# Patient Record
Sex: FEMALE | Race: WHITE | URBAN - METROPOLITAN AREA
[De-identification: names, ages, dates, MRNs, and addresses within clinical notes are randomized per-mention and may not be internally consistent; named-entity substitution may affect disease eponyms.]

---

## 2017-01-01 ENCOUNTER — APPOINTMENT (OUTPATIENT)
Dept: OCCUPATIONAL THERAPY | Facility: CLINIC | Age: 82
DRG: 545 | End: 2017-01-01
Payer: MEDICARE

## 2017-01-01 ENCOUNTER — HOSPITAL ENCOUNTER (OUTPATIENT)
Facility: CLINIC | Age: 82
Setting detail: OBSERVATION
Discharge: NURSING, REHAB OR LONG-TERM CARE | End: 2017-02-15
Attending: EMERGENCY MEDICINE | Admitting: INTERNAL MEDICINE
Payer: MEDICARE

## 2017-01-01 ENCOUNTER — HOSPITAL ENCOUNTER (EMERGENCY)
Facility: CLINIC | Age: 82
Discharge: HOME | End: 2017-04-13
Attending: NURSE PRACTITIONER | Admitting: NURSE PRACTITIONER
Payer: MEDICARE

## 2017-01-01 ENCOUNTER — HOSPITAL ENCOUNTER (INPATIENT)
Facility: CLINIC | Age: 82
LOS: 13 days | Discharge: HOSPICE | DRG: 545 | End: 2017-12-26
Attending: EMERGENCY MEDICINE | Admitting: INTERNAL MEDICINE
Payer: MEDICARE

## 2017-01-01 ENCOUNTER — APPOINTMENT (OUTPATIENT)
Dept: GENERAL RADIOLOGY | Facility: CLINIC | Age: 82
DRG: 545 | End: 2017-01-01
Attending: EMERGENCY MEDICINE
Payer: MEDICARE

## 2017-01-01 ENCOUNTER — APPOINTMENT (OUTPATIENT)
Dept: PHYSICAL THERAPY | Facility: CLINIC | Age: 82
End: 2017-01-01
Payer: MEDICARE

## 2017-01-01 ENCOUNTER — APPOINTMENT (OUTPATIENT)
Dept: GENERAL RADIOLOGY | Facility: CLINIC | Age: 82
End: 2017-01-01
Attending: EMERGENCY MEDICINE
Payer: MEDICARE

## 2017-01-01 ENCOUNTER — HOSPITAL ENCOUNTER (OUTPATIENT)
Facility: CLINIC | Age: 82
Setting detail: OBSERVATION
Discharge: HOME | End: 2017-02-13
Attending: EMERGENCY MEDICINE | Admitting: INTERNAL MEDICINE
Payer: MEDICARE

## 2017-01-01 ENCOUNTER — APPOINTMENT (OUTPATIENT)
Dept: OCCUPATIONAL THERAPY | Facility: CLINIC | Age: 82
DRG: 545 | End: 2017-01-01
Attending: INTERNAL MEDICINE
Payer: MEDICARE

## 2017-01-01 ENCOUNTER — TRANSFERRED RECORDS (OUTPATIENT)
Dept: HEALTH INFORMATION MANAGEMENT | Facility: CLINIC | Age: 82
End: 2017-01-01

## 2017-01-01 ENCOUNTER — APPOINTMENT (OUTPATIENT)
Dept: PHYSICAL THERAPY | Facility: CLINIC | Age: 82
End: 2017-01-01
Attending: PHYSICIAN ASSISTANT
Payer: MEDICARE

## 2017-01-01 ENCOUNTER — APPOINTMENT (OUTPATIENT)
Dept: CT IMAGING | Facility: CLINIC | Age: 82
DRG: 545 | End: 2017-01-01
Attending: INTERNAL MEDICINE
Payer: MEDICARE

## 2017-01-01 ENCOUNTER — APPOINTMENT (OUTPATIENT)
Dept: CT IMAGING | Facility: CLINIC | Age: 82
End: 2017-01-01
Attending: EMERGENCY MEDICINE
Payer: MEDICARE

## 2017-01-01 ENCOUNTER — APPOINTMENT (OUTPATIENT)
Dept: CARDIOLOGY | Facility: CLINIC | Age: 82
DRG: 545 | End: 2017-01-01
Attending: INTERNAL MEDICINE
Payer: MEDICARE

## 2017-01-01 ENCOUNTER — APPOINTMENT (OUTPATIENT)
Dept: PHYSICAL THERAPY | Facility: CLINIC | Age: 82
DRG: 545 | End: 2017-01-01
Payer: MEDICARE

## 2017-01-01 ENCOUNTER — APPOINTMENT (OUTPATIENT)
Dept: CT IMAGING | Facility: CLINIC | Age: 82
DRG: 545 | End: 2017-01-01
Attending: EMERGENCY MEDICINE
Payer: MEDICARE

## 2017-01-01 ENCOUNTER — HOSPITAL ENCOUNTER (EMERGENCY)
Facility: CLINIC | Age: 82
Discharge: HOME | End: 2017-02-19
Attending: EMERGENCY MEDICINE | Admitting: EMERGENCY MEDICINE
Payer: MEDICARE

## 2017-01-01 VITALS
SYSTOLIC BLOOD PRESSURE: 132 MMHG | TEMPERATURE: 98.4 F | HEIGHT: 64 IN | RESPIRATION RATE: 12 BRPM | WEIGHT: 143.7 LBS | HEART RATE: 75 BPM | OXYGEN SATURATION: 96 % | DIASTOLIC BLOOD PRESSURE: 70 MMHG | BODY MASS INDEX: 24.53 KG/M2

## 2017-01-01 VITALS
HEART RATE: 82 BPM | TEMPERATURE: 96.5 F | DIASTOLIC BLOOD PRESSURE: 83 MMHG | OXYGEN SATURATION: 92 % | SYSTOLIC BLOOD PRESSURE: 176 MMHG | WEIGHT: 155 LBS | RESPIRATION RATE: 22 BRPM

## 2017-01-01 VITALS
WEIGHT: 135 LBS | RESPIRATION RATE: 18 BRPM | TEMPERATURE: 97.6 F | HEART RATE: 68 BPM | SYSTOLIC BLOOD PRESSURE: 166 MMHG | HEIGHT: 62 IN | DIASTOLIC BLOOD PRESSURE: 78 MMHG | BODY MASS INDEX: 24.84 KG/M2 | OXYGEN SATURATION: 97 %

## 2017-01-01 VITALS
DIASTOLIC BLOOD PRESSURE: 87 MMHG | HEIGHT: 63 IN | SYSTOLIC BLOOD PRESSURE: 157 MMHG | RESPIRATION RATE: 16 BRPM | TEMPERATURE: 97 F | BODY MASS INDEX: 25.45 KG/M2 | OXYGEN SATURATION: 96 % | WEIGHT: 143.6 LBS | HEART RATE: 129 BPM

## 2017-01-01 VITALS
HEART RATE: 71 BPM | OXYGEN SATURATION: 95 % | HEIGHT: 62 IN | RESPIRATION RATE: 18 BRPM | TEMPERATURE: 97.8 F | BODY MASS INDEX: 27.6 KG/M2 | WEIGHT: 150 LBS | SYSTOLIC BLOOD PRESSURE: 154 MMHG | DIASTOLIC BLOOD PRESSURE: 85 MMHG

## 2017-01-01 DIAGNOSIS — G47.00 INSOMNIA, UNSPECIFIED TYPE: Primary | ICD-10-CM

## 2017-01-01 DIAGNOSIS — F41.9 ANXIETY: Primary | ICD-10-CM

## 2017-01-01 DIAGNOSIS — S09.90XA CLOSED HEAD INJURY, INITIAL ENCOUNTER: ICD-10-CM

## 2017-01-01 DIAGNOSIS — I48.91 ATRIAL FIBRILLATION WITH RVR (H): ICD-10-CM

## 2017-01-01 DIAGNOSIS — R05.9 COUGH: Primary | ICD-10-CM

## 2017-01-01 DIAGNOSIS — G89.4 CHRONIC PAIN SYNDROME: ICD-10-CM

## 2017-01-01 DIAGNOSIS — R05.9 COUGH: ICD-10-CM

## 2017-01-01 DIAGNOSIS — R53.1 GENERALIZED WEAKNESS: ICD-10-CM

## 2017-01-01 DIAGNOSIS — N39.0 URINARY TRACT INFECTION WITHOUT HEMATURIA, SITE UNSPECIFIED: ICD-10-CM

## 2017-01-01 DIAGNOSIS — K59.03 DRUG-INDUCED CONSTIPATION: ICD-10-CM

## 2017-01-01 DIAGNOSIS — M54.41 CHRONIC BILATERAL LOW BACK PAIN WITH RIGHT-SIDED SCIATICA: ICD-10-CM

## 2017-01-01 DIAGNOSIS — R82.71 ASYMPTOMATIC BACTERIURIA: ICD-10-CM

## 2017-01-01 DIAGNOSIS — R93.0 ABNORMAL CT SCAN OF HEAD: ICD-10-CM
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
## 2017-01-01 DIAGNOSIS — M79.10 MYALGIA: ICD-10-CM

## 2017-01-01 DIAGNOSIS — G89.29 CHRONIC BILATERAL LOW BACK PAIN WITH RIGHT-SIDED SCIATICA: ICD-10-CM

## 2017-01-01 DIAGNOSIS — M25.552 HIP PAIN, LEFT: ICD-10-CM

## 2017-01-01 DIAGNOSIS — R30.0 DYSURIA: ICD-10-CM

## 2017-01-01 LAB
ALBUMIN SERPL-MCNC: 2.4 G/DL (ref 3.4–5)
ALBUMIN SERPL-MCNC: 3.6 G/DL (ref 3.4–5)
ALBUMIN UR-MCNC: 100 MG/DL
ALBUMIN UR-MCNC: NEGATIVE MG/DL
ALP SERPL-CCNC: 61 U/L (ref 40–150)
ALP SERPL-CCNC: 79 U/L (ref 40–150)
ALT SERPL W P-5'-P-CCNC: 12 U/L (ref 0–50)
ALT SERPL W P-5'-P-CCNC: 28 U/L (ref 0–50)
AMORPH CRY #/AREA URNS HPF: ABNORMAL /HPF
ANION GAP SERPL CALCULATED.3IONS-SCNC: 12 MMOL/L (ref 3–14)
ANION GAP SERPL CALCULATED.3IONS-SCNC: 7 MMOL/L (ref 3–14)
ANION GAP SERPL CALCULATED.3IONS-SCNC: 8 MMOL/L (ref 3–14)
ANION GAP SERPL CALCULATED.3IONS-SCNC: 8 MMOL/L (ref 3–14)
ANION GAP SERPL CALCULATED.3IONS-SCNC: 9 MMOL/L (ref 3–14)
ANION GAP SERPL CALCULATED.3IONS-SCNC: 9 MMOL/L (ref 3–14)
APPEARANCE UR: ABNORMAL
APPEARANCE UR: ABNORMAL
APPEARANCE UR: CLEAR
APPEARANCE UR: CLEAR
APTT PPP: 52 SEC (ref 22–37)
AST SERPL W P-5'-P-CCNC: 17 U/L (ref 0–45)
AST SERPL W P-5'-P-CCNC: 36 U/L (ref 0–45)
BACTERIA #/AREA URNS HPF: ABNORMAL /HPF
BACTERIA #/AREA URNS HPF: ABNORMAL /HPF
BACTERIA SPEC CULT: ABNORMAL
BACTERIA SPEC CULT: NO GROWTH
BACTERIA SPEC CULT: NORMAL
BACTERIA SPEC CULT: NORMAL
BASOPHILS # BLD AUTO: 0 10E9/L (ref 0–0.2)
BASOPHILS NFR BLD AUTO: 0.2 %
BASOPHILS NFR BLD AUTO: 0.2 %
BASOPHILS NFR BLD AUTO: 0.3 %
BASOPHILS NFR BLD AUTO: 0.3 %
BASOPHILS NFR BLD AUTO: 0.4 %
BASOPHILS NFR BLD AUTO: 0.5 %
BILIRUB DIRECT SERPL-MCNC: 0.2 MG/DL (ref 0–0.2)
BILIRUB SERPL-MCNC: 0.4 MG/DL (ref 0.2–1.3)
BILIRUB SERPL-MCNC: 1 MG/DL (ref 0.2–1.3)
BILIRUB UR QL STRIP: NEGATIVE
BUN SERPL-MCNC: 12 MG/DL (ref 7–30)
BUN SERPL-MCNC: 13 MG/DL (ref 7–30)
BUN SERPL-MCNC: 16 MG/DL (ref 7–30)
BUN SERPL-MCNC: 16 MG/DL (ref 7–30)
BUN SERPL-MCNC: 17 MG/DL (ref 7–30)
BUN SERPL-MCNC: 17 MG/DL (ref 7–30)
BUN SERPL-MCNC: 18 MG/DL (ref 7–30)
CALCIUM SERPL-MCNC: 8.5 MG/DL (ref 8.5–10.1)
CALCIUM SERPL-MCNC: 8.5 MG/DL (ref 8.5–10.1)
CALCIUM SERPL-MCNC: 8.6 MG/DL (ref 8.5–10.1)
CALCIUM SERPL-MCNC: 8.7 MG/DL (ref 8.5–10.1)
CALCIUM SERPL-MCNC: 8.9 MG/DL (ref 8.5–10.1)
CALCIUM SERPL-MCNC: 9 MG/DL (ref 8.5–10.1)
CALCIUM SERPL-MCNC: 9 MG/DL (ref 8.5–10.1)
CALCIUM SERPL-MCNC: 9.1 MG/DL (ref 8.5–10.1)
CALCIUM SERPL-MCNC: 9.1 MG/DL (ref 8.5–10.1)
CHLORIDE SERPL-SCNC: 102 MMOL/L (ref 94–109)
CHLORIDE SERPL-SCNC: 102 MMOL/L (ref 94–109)
CHLORIDE SERPL-SCNC: 103 MMOL/L (ref 94–109)
CHLORIDE SERPL-SCNC: 104 MMOL/L (ref 94–109)
CHLORIDE SERPL-SCNC: 105 MMOL/L (ref 94–109)
CK SERPL-CCNC: 103 U/L (ref 30–225)
CO2 SERPL-SCNC: 22 MMOL/L (ref 20–32)
CO2 SERPL-SCNC: 23 MMOL/L (ref 20–32)
CO2 SERPL-SCNC: 24 MMOL/L (ref 20–32)
CO2 SERPL-SCNC: 24 MMOL/L (ref 20–32)
CO2 SERPL-SCNC: 26 MMOL/L (ref 20–32)
CO2 SERPL-SCNC: 27 MMOL/L (ref 20–32)
CO2 SERPL-SCNC: 27 MMOL/L (ref 20–32)
COLOR UR AUTO: ABNORMAL
COLOR UR AUTO: ABNORMAL
COLOR UR AUTO: YELLOW
COLOR UR AUTO: YELLOW
CREAT SERPL-MCNC: 0.55 MG/DL (ref 0.52–1.04)
CREAT SERPL-MCNC: 0.63 MG/DL (ref 0.52–1.04)
CREAT SERPL-MCNC: 0.65 MG/DL (ref 0.52–1.04)
CREAT SERPL-MCNC: 0.68 MG/DL (ref 0.52–1.04)
CREAT SERPL-MCNC: 0.69 MG/DL (ref 0.52–1.04)
CREAT SERPL-MCNC: 0.77 MG/DL (ref 0.52–1.04)
CREAT SERPL-MCNC: 0.86 MG/DL (ref 0.52–1.04)
CREAT SERPL-MCNC: 0.86 MG/DL (ref 0.52–1.04)
CREAT SERPL-MCNC: 0.99 MG/DL (ref 0.52–1.04)
CRP SERPL-MCNC: 180 MG/L (ref 0–8)
CRP SERPL-MCNC: 30.2 MG/L (ref 0–8)
DIFFERENTIAL METHOD BLD: ABNORMAL
DIFFERENTIAL METHOD BLD: NORMAL
EOSINOPHIL # BLD AUTO: 0 10E9/L (ref 0–0.7)
EOSINOPHIL # BLD AUTO: 0.1 10E9/L (ref 0–0.7)
EOSINOPHIL # BLD AUTO: 0.2 10E9/L (ref 0–0.7)
EOSINOPHIL # BLD AUTO: 0.2 10E9/L (ref 0–0.7)
EOSINOPHIL # BLD AUTO: 0.3 10E9/L (ref 0–0.7)
EOSINOPHIL # BLD AUTO: 0.3 10E9/L (ref 0–0.7)
EOSINOPHIL NFR BLD AUTO: 0.1 %
EOSINOPHIL NFR BLD AUTO: 1 %
EOSINOPHIL NFR BLD AUTO: 2 %
EOSINOPHIL NFR BLD AUTO: 3.7 %
EOSINOPHIL NFR BLD AUTO: 3.8 %
EOSINOPHIL NFR BLD AUTO: 4.9 %
ERYTHROCYTE [DISTWIDTH] IN BLOOD BY AUTOMATED COUNT: 12 % (ref 10–15)
ERYTHROCYTE [DISTWIDTH] IN BLOOD BY AUTOMATED COUNT: 12.1 % (ref 10–15)
ERYTHROCYTE [DISTWIDTH] IN BLOOD BY AUTOMATED COUNT: 12.3 % (ref 10–15)
ERYTHROCYTE [DISTWIDTH] IN BLOOD BY AUTOMATED COUNT: 12.4 % (ref 10–15)
ERYTHROCYTE [DISTWIDTH] IN BLOOD BY AUTOMATED COUNT: 13 % (ref 10–15)
ERYTHROCYTE [DISTWIDTH] IN BLOOD BY AUTOMATED COUNT: 13.1 % (ref 10–15)
ERYTHROCYTE [DISTWIDTH] IN BLOOD BY AUTOMATED COUNT: 13.2 % (ref 10–15)
ERYTHROCYTE [DISTWIDTH] IN BLOOD BY AUTOMATED COUNT: 13.5 % (ref 10–15)
ERYTHROCYTE [SEDIMENTATION RATE] IN BLOOD BY WESTERGREN METHOD: 73 MM/H (ref 0–30)
ERYTHROCYTE [SEDIMENTATION RATE] IN BLOOD BY WESTERGREN METHOD: 95 MM/H (ref 0–30)
GFR SERPL CREATININE-BSD FRML MDRD: 53 ML/MIN/1.7M2
GFR SERPL CREATININE-BSD FRML MDRD: 62 ML/MIN/1.7M2
GFR SERPL CREATININE-BSD FRML MDRD: 63 ML/MIN/1.7M2
GFR SERPL CREATININE-BSD FRML MDRD: 71 ML/MIN/1.7M2
GFR SERPL CREATININE-BSD FRML MDRD: 80 ML/MIN/1.7M2
GFR SERPL CREATININE-BSD FRML MDRD: 82 ML/MIN/1.7M2
GFR SERPL CREATININE-BSD FRML MDRD: 87 ML/MIN/1.7M2
GFR SERPL CREATININE-BSD FRML MDRD: >90 ML/MIN/1.7M2
GFR SERPL CREATININE-BSD FRML MDRD: >90 ML/MIN/1.7M2
GLUCOSE SERPL-MCNC: 101 MG/DL (ref 70–99)
GLUCOSE SERPL-MCNC: 102 MG/DL (ref 70–99)
GLUCOSE SERPL-MCNC: 106 MG/DL (ref 70–99)
GLUCOSE SERPL-MCNC: 107 MG/DL (ref 70–99)
GLUCOSE SERPL-MCNC: 114 MG/DL (ref 70–99)
GLUCOSE SERPL-MCNC: 140 MG/DL (ref 70–99)
GLUCOSE SERPL-MCNC: 173 MG/DL (ref 70–99)
GLUCOSE SERPL-MCNC: 93 MG/DL (ref 70–99)
GLUCOSE SERPL-MCNC: 98 MG/DL (ref 70–99)
GLUCOSE UR STRIP-MCNC: NEGATIVE MG/DL
HCT VFR BLD AUTO: 34 % (ref 35–47)
HCT VFR BLD AUTO: 34.4 % (ref 35–47)
HCT VFR BLD AUTO: 35.6 % (ref 35–47)
HCT VFR BLD AUTO: 37.2 % (ref 35–47)
HCT VFR BLD AUTO: 38.5 % (ref 35–47)
HCT VFR BLD AUTO: 38.8 % (ref 35–47)
HCT VFR BLD AUTO: 39.8 % (ref 35–47)
HCT VFR BLD AUTO: 40.3 % (ref 35–47)
HGB BLD-MCNC: 11.1 G/DL (ref 11.7–15.7)
HGB BLD-MCNC: 11.2 G/DL (ref 11.7–15.7)
HGB BLD-MCNC: 11.9 G/DL (ref 11.7–15.7)
HGB BLD-MCNC: 12.2 G/DL (ref 11.7–15.7)
HGB BLD-MCNC: 12.4 G/DL (ref 11.7–15.7)
HGB BLD-MCNC: 12.9 G/DL (ref 11.7–15.7)
HGB BLD-MCNC: 12.9 G/DL (ref 11.7–15.7)
HGB BLD-MCNC: 13.7 G/DL (ref 11.7–15.7)
HGB UR QL STRIP: NEGATIVE
IMM GRANULOCYTES # BLD: 0 10E9/L (ref 0–0.4)
IMM GRANULOCYTES NFR BLD: 0.1 %
IMM GRANULOCYTES NFR BLD: 0.2 %
IMM GRANULOCYTES NFR BLD: 0.3 %
IMM GRANULOCYTES NFR BLD: 0.6 %
INR PPP: 1.28 (ref 0.86–1.14)
INR PPP: 1.95 (ref 0.86–1.14)
INR PPP: 2.13 (ref 0.86–1.14)
INR PPP: 2.25 (ref 0.86–1.14)
INR PPP: 2.29 (ref 0.86–1.14)
INR PPP: 2.77 (ref 0.86–1.14)
INR PPP: 2.78 (ref 0.86–1.14)
INR PPP: 2.98 (ref 0.86–1.14)
INR PPP: 3.2 (ref 0.86–1.14)
INR PPP: 3.57 (ref 0.86–1.14)
INR PPP: 4.25 (ref 0.86–1.14)
INTERPRETATION ECG - MUSE: NORMAL
KETONES UR STRIP-MCNC: 20 MG/DL
KETONES UR STRIP-MCNC: 5 MG/DL
KETONES UR STRIP-MCNC: NEGATIVE MG/DL
KETONES UR STRIP-MCNC: NEGATIVE MG/DL
LACTATE BLD-SCNC: 0.6 MMOL/L (ref 0.7–2)
LACTATE BLD-SCNC: 0.9 MMOL/L (ref 0.7–2)
LACTATE BLD-SCNC: 1 MMOL/L (ref 0.7–2)
LACTATE BLD-SCNC: 1.1 MMOL/L (ref 0.7–2.1)
LACTATE BLD-SCNC: 1.5 MMOL/L (ref 0.7–2)
LEUKOCYTE ESTERASE UR QL STRIP: ABNORMAL
LEUKOCYTE ESTERASE UR QL STRIP: NEGATIVE
LYMPHOCYTES # BLD AUTO: 2.1 10E9/L (ref 0.8–5.3)
LYMPHOCYTES # BLD AUTO: 2.1 10E9/L (ref 0.8–5.3)
LYMPHOCYTES # BLD AUTO: 2.5 10E9/L (ref 0.8–5.3)
LYMPHOCYTES # BLD AUTO: 2.6 10E9/L (ref 0.8–5.3)
LYMPHOCYTES # BLD AUTO: 2.6 10E9/L (ref 0.8–5.3)
LYMPHOCYTES # BLD AUTO: 2.9 10E9/L (ref 0.8–5.3)
LYMPHOCYTES NFR BLD AUTO: 22.3 %
LYMPHOCYTES NFR BLD AUTO: 23.9 %
LYMPHOCYTES NFR BLD AUTO: 31.2 %
LYMPHOCYTES NFR BLD AUTO: 35.4 %
LYMPHOCYTES NFR BLD AUTO: 45.3 %
LYMPHOCYTES NFR BLD AUTO: 46.9 %
Lab: ABNORMAL
Lab: NORMAL
MAGNESIUM SERPL-MCNC: 2.2 MG/DL (ref 1.6–2.3)
MCH RBC QN AUTO: 30.3 PG (ref 26.5–33)
MCH RBC QN AUTO: 30.6 PG (ref 26.5–33)
MCH RBC QN AUTO: 30.8 PG (ref 26.5–33)
MCH RBC QN AUTO: 31.2 PG (ref 26.5–33)
MCH RBC QN AUTO: 32.6 PG (ref 26.5–33)
MCH RBC QN AUTO: 32.8 PG (ref 26.5–33)
MCH RBC QN AUTO: 33.4 PG (ref 26.5–33)
MCH RBC QN AUTO: 33.4 PG (ref 26.5–33)
MCHC RBC AUTO-ENTMCNC: 31.7 G/DL (ref 31.5–36.5)
MCHC RBC AUTO-ENTMCNC: 32.4 G/DL (ref 31.5–36.5)
MCHC RBC AUTO-ENTMCNC: 32.6 G/DL (ref 31.5–36.5)
MCHC RBC AUTO-ENTMCNC: 32.6 G/DL (ref 31.5–36.5)
MCHC RBC AUTO-ENTMCNC: 33.2 G/DL (ref 31.5–36.5)
MCHC RBC AUTO-ENTMCNC: 33.3 G/DL (ref 31.5–36.5)
MCHC RBC AUTO-ENTMCNC: 33.4 G/DL (ref 31.5–36.5)
MCHC RBC AUTO-ENTMCNC: 34 G/DL (ref 31.5–36.5)
MCV RBC AUTO: 100 FL (ref 78–100)
MCV RBC AUTO: 100 FL (ref 78–100)
MCV RBC AUTO: 94 FL (ref 78–100)
MCV RBC AUTO: 96 FL (ref 78–100)
MCV RBC AUTO: 98 FL (ref 78–100)
MCV RBC AUTO: 98 FL (ref 78–100)
MICRO REPORT STATUS: NORMAL
MONOCYTES # BLD AUTO: 0.5 10E9/L (ref 0–1.3)
MONOCYTES # BLD AUTO: 0.6 10E9/L (ref 0–1.3)
MONOCYTES # BLD AUTO: 0.6 10E9/L (ref 0–1.3)
MONOCYTES # BLD AUTO: 0.7 10E9/L (ref 0–1.3)
MONOCYTES # BLD AUTO: 1.2 10E9/L (ref 0–1.3)
MONOCYTES # BLD AUTO: 1.5 10E9/L (ref 0–1.3)
MONOCYTES NFR BLD AUTO: 10.8 %
MONOCYTES NFR BLD AUTO: 12.5 %
MONOCYTES NFR BLD AUTO: 13.9 %
MONOCYTES NFR BLD AUTO: 7.6 %
MONOCYTES NFR BLD AUTO: 9.8 %
MONOCYTES NFR BLD AUTO: 9.9 %
MUCOUS THREADS #/AREA URNS LPF: PRESENT /LPF
MUCOUS THREADS #/AREA URNS LPF: PRESENT /LPF
NEUTROPHILS # BLD AUTO: 2.1 10E9/L (ref 1.6–8.3)
NEUTROPHILS # BLD AUTO: 2.4 10E9/L (ref 1.6–8.3)
NEUTROPHILS # BLD AUTO: 3.9 10E9/L (ref 1.6–8.3)
NEUTROPHILS # BLD AUTO: 3.9 10E9/L (ref 1.6–8.3)
NEUTROPHILS # BLD AUTO: 5.3 10E9/L (ref 1.6–8.3)
NEUTROPHILS # BLD AUTO: 7.5 10E9/L (ref 1.6–8.3)
NEUTROPHILS NFR BLD AUTO: 38.3 %
NEUTROPHILS NFR BLD AUTO: 38.9 %
NEUTROPHILS NFR BLD AUTO: 52.4 %
NEUTROPHILS NFR BLD AUTO: 56.4 %
NEUTROPHILS NFR BLD AUTO: 60.7 %
NEUTROPHILS NFR BLD AUTO: 64.6 %
NITRATE UR QL: NEGATIVE
NRBC # BLD AUTO: 0 10*3/UL
NRBC BLD AUTO-RTO: 0 /100
PH UR STRIP: 5 PH (ref 5–7)
PH UR STRIP: 6 PH (ref 5–7)
PH UR STRIP: 6.5 PH (ref 5–7)
PH UR STRIP: 7 PH (ref 5–7)
PLATELET # BLD AUTO: 251 10E9/L (ref 150–450)
PLATELET # BLD AUTO: 253 10E9/L (ref 150–450)
PLATELET # BLD AUTO: 283 10E9/L (ref 150–450)
PLATELET # BLD AUTO: 287 10E9/L (ref 150–450)
PLATELET # BLD AUTO: 292 10E9/L (ref 150–450)
PLATELET # BLD AUTO: 337 10E9/L (ref 150–450)
PLATELET # BLD AUTO: 362 10E9/L (ref 150–450)
PLATELET # BLD AUTO: 369 10E9/L (ref 150–450)
POTASSIUM SERPL-SCNC: 3.6 MMOL/L (ref 3.4–5.3)
POTASSIUM SERPL-SCNC: 3.8 MMOL/L (ref 3.4–5.3)
POTASSIUM SERPL-SCNC: 3.9 MMOL/L (ref 3.4–5.3)
POTASSIUM SERPL-SCNC: 3.9 MMOL/L (ref 3.4–5.3)
POTASSIUM SERPL-SCNC: 4.1 MMOL/L (ref 3.4–5.3)
POTASSIUM SERPL-SCNC: 4.1 MMOL/L (ref 3.4–5.3)
POTASSIUM SERPL-SCNC: 4.2 MMOL/L (ref 3.4–5.3)
POTASSIUM SERPL-SCNC: 5 MMOL/L (ref 3.4–5.3)
PROCALCITONIN SERPL-MCNC: 0.15 NG/ML
PROT SERPL-MCNC: 6.7 G/DL (ref 6.8–8.8)
PROT SERPL-MCNC: 7.4 G/DL (ref 6.8–8.8)
RBC # BLD AUTO: 3.44 10E12/L (ref 3.8–5.2)
RBC # BLD AUTO: 3.56 10E12/L (ref 3.8–5.2)
RBC # BLD AUTO: 3.6 10E12/L (ref 3.8–5.2)
RBC # BLD AUTO: 3.78 10E12/L (ref 3.8–5.2)
RBC # BLD AUTO: 4.02 10E12/L (ref 3.8–5.2)
RBC # BLD AUTO: 4.1 10E12/L (ref 3.8–5.2)
RBC # BLD AUTO: 4.13 10E12/L (ref 3.8–5.2)
RBC # BLD AUTO: 4.22 10E12/L (ref 3.8–5.2)
RBC #/AREA URNS AUTO: 1 /HPF (ref 0–2)
RBC #/AREA URNS AUTO: 2 /HPF (ref 0–2)
RBC #/AREA URNS AUTO: 8 /HPF (ref 0–2)
RBC #/AREA URNS AUTO: <1 /HPF (ref 0–2)
SODIUM SERPL-SCNC: 134 MMOL/L (ref 133–144)
SODIUM SERPL-SCNC: 135 MMOL/L (ref 133–144)
SODIUM SERPL-SCNC: 136 MMOL/L (ref 133–144)
SODIUM SERPL-SCNC: 137 MMOL/L (ref 133–144)
SODIUM SERPL-SCNC: 138 MMOL/L (ref 133–144)
SODIUM SERPL-SCNC: 139 MMOL/L (ref 133–144)
SODIUM SERPL-SCNC: 140 MMOL/L (ref 133–144)
SOURCE: ABNORMAL
SP GR UR STRIP: 1 (ref 1–1.03)
SP GR UR STRIP: 1.01 (ref 1–1.03)
SP GR UR STRIP: 1.01 (ref 1–1.03)
SP GR UR STRIP: 1.02 (ref 1–1.03)
SPECIMEN SOURCE: ABNORMAL
SPECIMEN SOURCE: NORMAL
SQUAMOUS #/AREA URNS AUTO: 1 /HPF (ref 0–1)
SQUAMOUS #/AREA URNS AUTO: 5 /HPF (ref 0–1)
TRANS CELLS #/AREA URNS HPF: 1 /HPF (ref 0–1)
TRANS CELLS #/AREA URNS HPF: <1 /HPF (ref 0–1)
TROPONIN I SERPL-MCNC: <0.015 UG/L (ref 0–0.04)
TROPONIN I SERPL-MCNC: NORMAL UG/L (ref 0–0.04)
URN SPEC COLLECT METH UR: ABNORMAL
URN SPEC COLLECT METH UR: ABNORMAL
URN SPEC COLLECT METH UR: NORMAL
UROBILINOGEN UR STRIP-MCNC: 0 MG/DL (ref 0–2)
UROBILINOGEN UR STRIP-MCNC: 4 MG/DL (ref 0–2)
UROBILINOGEN UR STRIP-MCNC: NORMAL MG/DL (ref 0–2)
UROBILINOGEN UR STRIP-MCNC: NORMAL MG/DL (ref 0–2)
WBC # BLD AUTO: 11.6 10E9/L (ref 4–11)
WBC # BLD AUTO: 5.5 10E9/L (ref 4–11)
WBC # BLD AUTO: 6.3 10E9/L (ref 4–11)
WBC # BLD AUTO: 6.9 10E9/L (ref 4–11)
WBC # BLD AUTO: 7.4 10E9/L (ref 4–11)
WBC # BLD AUTO: 8.2 10E9/L (ref 4–11)
WBC # BLD AUTO: 8.8 10E9/L (ref 4–11)
WBC # BLD AUTO: 9.5 10E9/L (ref 4–11)
WBC #/AREA URNS AUTO: 104 /HPF (ref 0–2)
WBC #/AREA URNS AUTO: 141 /HPF (ref 0–2)
WBC #/AREA URNS AUTO: 35 /HPF (ref 0–2)
WBC #/AREA URNS AUTO: <1 /HPF (ref 0–2)

## 2017-01-01 PROCEDURE — 25000132 ZZH RX MED GY IP 250 OP 250 PS 637: Mod: GY | Performed by: INTERNAL MEDICINE

## 2017-01-01 PROCEDURE — 25000132 ZZH RX MED GY IP 250 OP 250 PS 637: Performed by: EMERGENCY MEDICINE

## 2017-01-01 PROCEDURE — 25000128 H RX IP 250 OP 636: Performed by: INTERNAL MEDICINE

## 2017-01-01 PROCEDURE — 85610 PROTHROMBIN TIME: CPT | Performed by: EMERGENCY MEDICINE

## 2017-01-01 PROCEDURE — 40000133 ZZH STATISTIC OT WARD VISIT

## 2017-01-01 PROCEDURE — 25000125 ZZHC RX 250: Performed by: INTERNAL MEDICINE

## 2017-01-01 PROCEDURE — 85025 COMPLETE CBC W/AUTO DIFF WBC: CPT | Performed by: EMERGENCY MEDICINE

## 2017-01-01 PROCEDURE — 94640 AIRWAY INHALATION TREATMENT: CPT

## 2017-01-01 PROCEDURE — 99233 SBSQ HOSP IP/OBS HIGH 50: CPT | Performed by: INTERNAL MEDICINE

## 2017-01-01 PROCEDURE — A9270 NON-COVERED ITEM OR SERVICE: HCPCS | Mod: GY | Performed by: INTERNAL MEDICINE

## 2017-01-01 PROCEDURE — 25000132 ZZH RX MED GY IP 250 OP 250 PS 637: Performed by: INTERNAL MEDICINE

## 2017-01-01 PROCEDURE — 99222 1ST HOSP IP/OBS MODERATE 55: CPT | Performed by: CLINICAL NURSE SPECIALIST

## 2017-01-01 PROCEDURE — 80076 HEPATIC FUNCTION PANEL: CPT | Performed by: INTERNAL MEDICINE

## 2017-01-01 PROCEDURE — 36415 COLL VENOUS BLD VENIPUNCTURE: CPT | Performed by: INTERNAL MEDICINE

## 2017-01-01 PROCEDURE — 97535 SELF CARE MNGMENT TRAINING: CPT | Mod: GO

## 2017-01-01 PROCEDURE — 81001 URINALYSIS AUTO W/SCOPE: CPT | Performed by: EMERGENCY MEDICINE

## 2017-01-01 PROCEDURE — 97530 THERAPEUTIC ACTIVITIES: CPT | Mod: GP | Performed by: PHYSICAL THERAPIST

## 2017-01-01 PROCEDURE — 85610 PROTHROMBIN TIME: CPT | Performed by: INTERNAL MEDICINE

## 2017-01-01 PROCEDURE — 36415 COLL VENOUS BLD VENIPUNCTURE: CPT | Performed by: PHYSICIAN ASSISTANT

## 2017-01-01 PROCEDURE — A9270 NON-COVERED ITEM OR SERVICE: HCPCS | Mod: GY | Performed by: CLINICAL NURSE SPECIALIST

## 2017-01-01 PROCEDURE — 25000132 ZZH RX MED GY IP 250 OP 250 PS 637: Mod: GY | Performed by: CLINICAL NURSE SPECIALIST

## 2017-01-01 PROCEDURE — G0378 HOSPITAL OBSERVATION PER HR: HCPCS

## 2017-01-01 PROCEDURE — 12000000 ZZH R&B MED SURG/OB

## 2017-01-01 PROCEDURE — 93005 ELECTROCARDIOGRAM TRACING: CPT

## 2017-01-01 PROCEDURE — 99233 SBSQ HOSP IP/OBS HIGH 50: CPT | Performed by: CLINICAL NURSE SPECIALIST

## 2017-01-01 PROCEDURE — 73080 X-RAY EXAM OF ELBOW: CPT | Mod: RT

## 2017-01-01 PROCEDURE — 12000007 ZZH R&B INTERMEDIATE

## 2017-01-01 PROCEDURE — 40000275 ZZH STATISTIC RCP TIME EA 10 MIN

## 2017-01-01 PROCEDURE — 83605 ASSAY OF LACTIC ACID: CPT | Performed by: INTERNAL MEDICINE

## 2017-01-01 PROCEDURE — 71010 XR CHEST 1 VW: CPT

## 2017-01-01 PROCEDURE — 85652 RBC SED RATE AUTOMATED: CPT | Performed by: INTERNAL MEDICINE

## 2017-01-01 PROCEDURE — 82550 ASSAY OF CK (CPK): CPT | Performed by: INTERNAL MEDICINE

## 2017-01-01 PROCEDURE — 73030 X-RAY EXAM OF SHOULDER: CPT | Mod: RT

## 2017-01-01 PROCEDURE — 73502 X-RAY EXAM HIP UNI 2-3 VIEWS: CPT

## 2017-01-01 PROCEDURE — 70450 CT HEAD/BRAIN W/O DYE: CPT

## 2017-01-01 PROCEDURE — 99285 EMERGENCY DEPT VISIT HI MDM: CPT | Mod: 25

## 2017-01-01 PROCEDURE — 40000193 ZZH STATISTIC PT WARD VISIT

## 2017-01-01 PROCEDURE — A9270 NON-COVERED ITEM OR SERVICE: HCPCS | Mod: GY | Performed by: PHYSICIAN ASSISTANT

## 2017-01-01 PROCEDURE — 36415 COLL VENOUS BLD VENIPUNCTURE: CPT | Performed by: EMERGENCY MEDICINE

## 2017-01-01 PROCEDURE — 86140 C-REACTIVE PROTEIN: CPT | Performed by: INTERNAL MEDICINE

## 2017-01-01 PROCEDURE — 96374 THER/PROPH/DIAG INJ IV PUSH: CPT

## 2017-01-01 PROCEDURE — 93306 TTE W/DOPPLER COMPLETE: CPT

## 2017-01-01 PROCEDURE — 80048 BASIC METABOLIC PNL TOTAL CA: CPT | Performed by: EMERGENCY MEDICINE

## 2017-01-01 PROCEDURE — 40000274 ZZH STATISTIC RCP CONSULT EA 30 MIN

## 2017-01-01 PROCEDURE — 25000132 ZZH RX MED GY IP 250 OP 250 PS 637: Mod: GY | Performed by: PHYSICIAN ASSISTANT

## 2017-01-01 PROCEDURE — 80048 BASIC METABOLIC PNL TOTAL CA: CPT | Performed by: INTERNAL MEDICINE

## 2017-01-01 PROCEDURE — 25000125 ZZHC RX 250: Performed by: EMERGENCY MEDICINE

## 2017-01-01 PROCEDURE — 99225 ZZC SUBSEQUENT OBSERVATION CARE,LEVEL II: CPT | Performed by: PHYSICIAN ASSISTANT

## 2017-01-01 PROCEDURE — 97161 PT EVAL LOW COMPLEX 20 MIN: CPT | Mod: GP

## 2017-01-01 PROCEDURE — 99223 1ST HOSP IP/OBS HIGH 75: CPT | Mod: AI | Performed by: INTERNAL MEDICINE

## 2017-01-01 PROCEDURE — 83605 ASSAY OF LACTIC ACID: CPT | Performed by: ORTHOPAEDIC SURGERY

## 2017-01-01 PROCEDURE — 25000128 H RX IP 250 OP 636: Performed by: EMERGENCY MEDICINE

## 2017-01-01 PROCEDURE — 36415 COLL VENOUS BLD VENIPUNCTURE: CPT | Performed by: ORTHOPAEDIC SURGERY

## 2017-01-01 PROCEDURE — 97110 THERAPEUTIC EXERCISES: CPT | Mod: GP | Performed by: PHYSICAL THERAPIST

## 2017-01-01 PROCEDURE — 85610 PROTHROMBIN TIME: CPT | Performed by: PHYSICIAN ASSISTANT

## 2017-01-01 PROCEDURE — 99220 ZZC INITIAL OBSERVATION CARE,LEVL III: CPT | Performed by: PHYSICIAN ASSISTANT

## 2017-01-01 PROCEDURE — 96376 TX/PRO/DX INJ SAME DRUG ADON: CPT

## 2017-01-01 PROCEDURE — 40000193 ZZH STATISTIC PT WARD VISIT: Performed by: PHYSICAL THERAPIST

## 2017-01-01 PROCEDURE — 87186 SC STD MICRODIL/AGAR DIL: CPT | Performed by: EMERGENCY MEDICINE

## 2017-01-01 PROCEDURE — 40000133 ZZH STATISTIC OT WARD VISIT: Performed by: OCCUPATIONAL THERAPIST

## 2017-01-01 PROCEDURE — 97116 GAIT TRAINING THERAPY: CPT | Mod: GP

## 2017-01-01 PROCEDURE — 83605 ASSAY OF LACTIC ACID: CPT | Performed by: EMERGENCY MEDICINE

## 2017-01-01 PROCEDURE — 80048 BASIC METABOLIC PNL TOTAL CA: CPT | Performed by: PHYSICIAN ASSISTANT

## 2017-01-01 PROCEDURE — 85730 THROMBOPLASTIN TIME PARTIAL: CPT | Performed by: EMERGENCY MEDICINE

## 2017-01-01 PROCEDURE — 84484 ASSAY OF TROPONIN QUANT: CPT | Performed by: EMERGENCY MEDICINE

## 2017-01-01 PROCEDURE — 72100 X-RAY EXAM L-S SPINE 2/3 VWS: CPT

## 2017-01-01 PROCEDURE — 97530 THERAPEUTIC ACTIVITIES: CPT | Mod: GP

## 2017-01-01 PROCEDURE — A9270 NON-COVERED ITEM OR SERVICE: HCPCS | Mod: GY

## 2017-01-01 PROCEDURE — 80053 COMPREHEN METABOLIC PANEL: CPT | Performed by: EMERGENCY MEDICINE

## 2017-01-01 PROCEDURE — A9270 NON-COVERED ITEM OR SERVICE: HCPCS | Mod: GY | Performed by: NURSE PRACTITIONER

## 2017-01-01 PROCEDURE — 25000132 ZZH RX MED GY IP 250 OP 250 PS 637: Mod: GY

## 2017-01-01 PROCEDURE — 85025 COMPLETE CBC W/AUTO DIFF WBC: CPT | Performed by: PHYSICIAN ASSISTANT

## 2017-01-01 PROCEDURE — 73110 X-RAY EXAM OF WRIST: CPT | Mod: RT

## 2017-01-01 PROCEDURE — 87040 BLOOD CULTURE FOR BACTERIA: CPT | Performed by: EMERGENCY MEDICINE

## 2017-01-01 PROCEDURE — 93306 TTE W/DOPPLER COMPLETE: CPT | Mod: 26 | Performed by: INTERNAL MEDICINE

## 2017-01-01 PROCEDURE — 83735 ASSAY OF MAGNESIUM: CPT | Performed by: INTERNAL MEDICINE

## 2017-01-01 PROCEDURE — 25000132 ZZH RX MED GY IP 250 OP 250 PS 637: Mod: GY | Performed by: EMERGENCY MEDICINE

## 2017-01-01 PROCEDURE — 72125 CT NECK SPINE W/O DYE: CPT

## 2017-01-01 PROCEDURE — 25000132 ZZH RX MED GY IP 250 OP 250 PS 637: Mod: GY | Performed by: NURSE PRACTITIONER

## 2017-01-01 PROCEDURE — 99212 OFFICE O/P EST SF 10 MIN: CPT

## 2017-01-01 PROCEDURE — 96375 TX/PRO/DX INJ NEW DRUG ADDON: CPT

## 2017-01-01 PROCEDURE — 73200 CT UPPER EXTREMITY W/O DYE: CPT | Mod: RT

## 2017-01-01 PROCEDURE — 99239 HOSP IP/OBS DSCHRG MGMT >30: CPT | Performed by: INTERNAL MEDICINE

## 2017-01-01 PROCEDURE — 84145 PROCALCITONIN (PCT): CPT | Performed by: INTERNAL MEDICINE

## 2017-01-01 PROCEDURE — 85027 COMPLETE CBC AUTOMATED: CPT | Performed by: INTERNAL MEDICINE

## 2017-01-01 PROCEDURE — 96361 HYDRATE IV INFUSION ADD-ON: CPT

## 2017-01-01 PROCEDURE — 99283 EMERGENCY DEPT VISIT LOW MDM: CPT

## 2017-01-01 PROCEDURE — A9270 NON-COVERED ITEM OR SERVICE: HCPCS | Mod: GY | Performed by: EMERGENCY MEDICINE

## 2017-01-01 PROCEDURE — 99232 SBSQ HOSP IP/OBS MODERATE 35: CPT | Performed by: INTERNAL MEDICINE

## 2017-01-01 PROCEDURE — 87086 URINE CULTURE/COLONY COUNT: CPT | Performed by: NURSE PRACTITIONER

## 2017-01-01 PROCEDURE — 0296T ZIO PATCH HOLTER: CPT | Performed by: PHYSICIAN ASSISTANT

## 2017-01-01 PROCEDURE — 99217 ZZC OBSERVATION CARE DISCHARGE: CPT | Performed by: PHYSICIAN ASSISTANT

## 2017-01-01 PROCEDURE — 99211 OFF/OP EST MAY X REQ PHY/QHP: CPT

## 2017-01-01 PROCEDURE — 87086 URINE CULTURE/COLONY COUNT: CPT | Performed by: EMERGENCY MEDICINE

## 2017-01-01 PROCEDURE — 71020 XR CHEST 2 VW: CPT

## 2017-01-01 PROCEDURE — 97161 PT EVAL LOW COMPLEX 20 MIN: CPT | Mod: GP | Performed by: PHYSICAL THERAPIST

## 2017-01-01 PROCEDURE — 87088 URINE BACTERIA CULTURE: CPT | Performed by: EMERGENCY MEDICINE

## 2017-01-01 PROCEDURE — 99219 ZZC INITIAL OBSERVATION CARE,LEVL II: CPT | Performed by: INTERNAL MEDICINE

## 2017-01-01 PROCEDURE — 84132 ASSAY OF SERUM POTASSIUM: CPT | Performed by: INTERNAL MEDICINE

## 2017-01-01 PROCEDURE — A9270 NON-COVERED ITEM OR SERVICE: HCPCS | Performed by: EMERGENCY MEDICINE

## 2017-01-01 PROCEDURE — 97535 SELF CARE MNGMENT TRAINING: CPT | Mod: GO | Performed by: OCCUPATIONAL THERAPIST

## 2017-01-01 PROCEDURE — A9270 NON-COVERED ITEM OR SERVICE: HCPCS | Performed by: INTERNAL MEDICINE

## 2017-01-01 PROCEDURE — 97166 OT EVAL MOD COMPLEX 45 MIN: CPT | Mod: GO

## 2017-01-01 PROCEDURE — 96365 THER/PROPH/DIAG IV INF INIT: CPT

## 2017-01-01 PROCEDURE — 85025 COMPLETE CBC W/AUTO DIFF WBC: CPT | Performed by: INTERNAL MEDICINE

## 2017-01-01 PROCEDURE — 93010 ELECTROCARDIOGRAM REPORT: CPT | Performed by: INTERNAL MEDICINE

## 2017-01-01 PROCEDURE — 72072 X-RAY EXAM THORAC SPINE 3VWS: CPT

## 2017-01-01 PROCEDURE — 97110 THERAPEUTIC EXERCISES: CPT | Mod: GP

## 2017-01-01 PROCEDURE — 81001 URINALYSIS AUTO W/SCOPE: CPT | Performed by: NURSE PRACTITIONER

## 2017-01-01 RX ORDER — LORAZEPAM 0.5 MG/1
0.5 TABLET ORAL 2 TIMES DAILY PRN
Qty: 30 TABLET | Refills: 0 | Status: SHIPPED | OUTPATIENT
Start: 2017-01-01 | End: 2017-01-01

## 2017-01-01 RX ORDER — NAPROXEN 500 MG/1
500 TABLET ORAL 2 TIMES DAILY PRN
Status: DISCONTINUED | OUTPATIENT
Start: 2017-01-01 | End: 2017-01-01 | Stop reason: HOSPADM

## 2017-01-01 RX ORDER — ONDANSETRON 4 MG/1
4 TABLET, ORALLY DISINTEGRATING ORAL EVERY 6 HOURS PRN
Status: DISCONTINUED | OUTPATIENT
Start: 2017-01-01 | End: 2017-01-01 | Stop reason: HOSPADM

## 2017-01-01 RX ORDER — GUAIFENESIN/DEXTROMETHORPHAN 100-10MG/5
5-10 SYRUP ORAL EVERY 4 HOURS PRN
Qty: 354 ML | Refills: 0 | Status: SHIPPED | OUTPATIENT
Start: 2017-01-01 | End: 2017-01-01

## 2017-01-01 RX ORDER — VALACYCLOVIR HYDROCHLORIDE 500 MG/1
500 TABLET, FILM COATED ORAL 2 TIMES DAILY PRN
COMMUNITY
End: 2017-01-01

## 2017-01-01 RX ORDER — OXYCODONE HCL 20 MG/ML
7.5 CONCENTRATE, ORAL ORAL EVERY 4 HOURS
Status: DISCONTINUED | OUTPATIENT
Start: 2017-01-01 | End: 2017-01-01 | Stop reason: CLARIF

## 2017-01-01 RX ORDER — FLUTICASONE PROPIONATE 50 MCG
1 SPRAY, SUSPENSION (ML) NASAL DAILY PRN
COMMUNITY
End: 2017-01-01

## 2017-01-01 RX ORDER — AMOXICILLIN 250 MG
1 CAPSULE ORAL DAILY
Status: DISCONTINUED | OUTPATIENT
Start: 2017-01-01 | End: 2017-01-01 | Stop reason: HOSPADM

## 2017-01-01 RX ORDER — ALBUTEROL SULFATE 90 UG/1
2 AEROSOL, METERED RESPIRATORY (INHALATION) EVERY 6 HOURS
Status: DISCONTINUED | OUTPATIENT
Start: 2017-01-01 | End: 2017-01-01

## 2017-01-01 RX ORDER — MULTIVITAMIN,THERAPEUTIC
1 TABLET ORAL DAILY
COMMUNITY
End: 2017-01-01

## 2017-01-01 RX ORDER — OXYCODONE HYDROCHLORIDE 100 MG/5ML
5-7.5 SOLUTION ORAL
Status: DISCONTINUED | OUTPATIENT
Start: 2017-01-01 | End: 2017-01-01 | Stop reason: HOSPADM

## 2017-01-01 RX ORDER — BISACODYL 10 MG
SUPPOSITORY, RECTAL RECTAL
Qty: 12 SUPPOSITORY | Refills: 1 | DISCHARGE
Start: 2017-01-01 | End: 2017-01-01

## 2017-01-01 RX ORDER — ATENOLOL 25 MG/1
25 TABLET ORAL 2 TIMES DAILY
Status: ON HOLD | COMMUNITY
End: 2017-01-01

## 2017-01-01 RX ORDER — OXYCODONE HYDROCHLORIDE 5 MG/1
5 TABLET ORAL
Status: DISCONTINUED | OUTPATIENT
Start: 2017-01-01 | End: 2017-01-01

## 2017-01-01 RX ORDER — LATANOPROST 50 UG/ML
1 SOLUTION/ DROPS OPHTHALMIC AT BEDTIME
COMMUNITY

## 2017-01-01 RX ORDER — ACETAMINOPHEN 325 MG/1
650 TABLET ORAL EVERY 4 HOURS PRN
Qty: 100 TABLET | Refills: 0 | Status: SHIPPED | OUTPATIENT
Start: 2017-01-01

## 2017-01-01 RX ORDER — LATANOPROST 50 UG/ML
1 SOLUTION/ DROPS OPHTHALMIC AT BEDTIME
Status: DISCONTINUED | OUTPATIENT
Start: 2017-01-01 | End: 2017-01-01 | Stop reason: HOSPADM

## 2017-01-01 RX ORDER — NAPROXEN 500 MG/1
500 TABLET ORAL 2 TIMES DAILY PRN
COMMUNITY
End: 2017-01-01

## 2017-01-01 RX ORDER — METHOCARBAMOL 750 MG/1
750 TABLET, FILM COATED ORAL ONCE
Status: COMPLETED | OUTPATIENT
Start: 2017-01-01 | End: 2017-01-01

## 2017-01-01 RX ORDER — ACETAMINOPHEN 325 MG/1
650 TABLET ORAL ONCE
Status: COMPLETED | OUTPATIENT
Start: 2017-01-01 | End: 2017-01-01

## 2017-01-01 RX ORDER — HYDROMORPHONE HCL/0.9% NACL/PF 0.2MG/0.2
0.2 SYRINGE (ML) INTRAVENOUS
Status: DISCONTINUED | OUTPATIENT
Start: 2017-01-01 | End: 2017-01-01

## 2017-01-01 RX ORDER — FLUTICASONE PROPIONATE 110 UG/1
1 AEROSOL, METERED RESPIRATORY (INHALATION) 2 TIMES DAILY
Status: DISCONTINUED | OUTPATIENT
Start: 2017-01-01 | End: 2017-01-01

## 2017-01-01 RX ORDER — POLYETHYLENE GLYCOL 3350 17 G/17G
17 POWDER, FOR SOLUTION ORAL DAILY PRN
Qty: 7 PACKET | DISCHARGE
Start: 2017-01-01

## 2017-01-01 RX ORDER — WARFARIN SODIUM 1 MG/1
1 TABLET ORAL
Status: COMPLETED | OUTPATIENT
Start: 2017-01-01 | End: 2017-01-01

## 2017-01-01 RX ORDER — ACETAMINOPHEN 325 MG/1
650 TABLET ORAL EVERY 4 HOURS PRN
Qty: 100 TABLET | DISCHARGE
Start: 2017-01-01 | End: 2017-01-01

## 2017-01-01 RX ORDER — MULTIPLE VITAMINS W/ MINERALS TAB 9MG-400MCG
1 TAB ORAL DAILY
COMMUNITY

## 2017-01-01 RX ORDER — OXYCODONE HYDROCHLORIDE 5 MG/1
5 TABLET ORAL
Status: DISCONTINUED | OUTPATIENT
Start: 2017-01-01 | End: 2017-01-01 | Stop reason: HOSPADM

## 2017-01-01 RX ORDER — SODIUM CHLORIDE 9 MG/ML
1000 INJECTION, SOLUTION INTRAVENOUS CONTINUOUS
Status: DISCONTINUED | OUTPATIENT
Start: 2017-01-01 | End: 2017-01-01

## 2017-01-01 RX ORDER — CEPHALEXIN 500 MG/1
500 CAPSULE ORAL 2 TIMES DAILY
Qty: 20 CAPSULE | Refills: 0 | Status: SHIPPED | OUTPATIENT
Start: 2017-01-01 | End: 2017-01-01

## 2017-01-01 RX ORDER — LANOLIN ALCOHOL/MO/W.PET/CERES
3 CREAM (GRAM) TOPICAL
Status: DISCONTINUED | OUTPATIENT
Start: 2017-01-01 | End: 2017-01-01 | Stop reason: HOSPADM

## 2017-01-01 RX ORDER — HYDROMORPHONE HYDROCHLORIDE 1 MG/ML
.3-.5 INJECTION, SOLUTION INTRAMUSCULAR; INTRAVENOUS; SUBCUTANEOUS
Status: DISCONTINUED | OUTPATIENT
Start: 2017-01-01 | End: 2017-01-01 | Stop reason: HOSPADM

## 2017-01-01 RX ORDER — CEPHALEXIN 500 MG/1
500 CAPSULE ORAL ONCE
Status: COMPLETED | OUTPATIENT
Start: 2017-01-01 | End: 2017-01-01

## 2017-01-01 RX ORDER — NALOXONE HYDROCHLORIDE 0.4 MG/ML
.1-.4 INJECTION, SOLUTION INTRAMUSCULAR; INTRAVENOUS; SUBCUTANEOUS
Status: DISCONTINUED | OUTPATIENT
Start: 2017-01-01 | End: 2017-01-01 | Stop reason: HOSPADM

## 2017-01-01 RX ORDER — FLUTICASONE PROPIONATE 110 UG/1
1 AEROSOL, METERED RESPIRATORY (INHALATION) 2 TIMES DAILY
Status: ON HOLD | COMMUNITY
End: 2017-01-01

## 2017-01-01 RX ORDER — ONDANSETRON 2 MG/ML
4 INJECTION INTRAMUSCULAR; INTRAVENOUS EVERY 6 HOURS PRN
Status: DISCONTINUED | OUTPATIENT
Start: 2017-01-01 | End: 2017-01-01 | Stop reason: HOSPADM

## 2017-01-01 RX ORDER — METOPROLOL TARTRATE 25 MG/1
25 TABLET, FILM COATED ORAL 3 TIMES DAILY
Status: DISCONTINUED | OUTPATIENT
Start: 2017-01-01 | End: 2017-01-01

## 2017-01-01 RX ORDER — POTASSIUM CHLORIDE 1.5 G/1.58G
20-40 POWDER, FOR SOLUTION ORAL
Status: DISCONTINUED | OUTPATIENT
Start: 2017-01-01 | End: 2017-01-01

## 2017-01-01 RX ORDER — HYDROCODONE BITARTRATE AND ACETAMINOPHEN 5; 325 MG/1; MG/1
1 TABLET ORAL EVERY 8 HOURS PRN
Qty: 9 TABLET | Refills: 0 | Status: SHIPPED | OUTPATIENT
Start: 2017-01-01 | End: 2017-01-01

## 2017-01-01 RX ORDER — ATROPINE SULFATE 10 MG/ML
2 SOLUTION/ DROPS OPHTHALMIC
Qty: 5 ML | Refills: 1 | Status: SHIPPED | OUTPATIENT
Start: 2017-01-01 | End: 2017-01-01

## 2017-01-01 RX ORDER — LIDOCAINE 40 MG/G
CREAM TOPICAL
Status: DISCONTINUED | OUTPATIENT
Start: 2017-01-01 | End: 2017-01-01 | Stop reason: HOSPADM

## 2017-01-01 RX ORDER — MORPHINE SULFATE 15 MG/1
15 TABLET, FILM COATED, EXTENDED RELEASE ORAL EVERY 12 HOURS SCHEDULED
Status: DISCONTINUED | OUTPATIENT
Start: 2017-01-01 | End: 2017-01-01

## 2017-01-01 RX ORDER — ALBUTEROL SULFATE 90 UG/1
1 AEROSOL, METERED RESPIRATORY (INHALATION) EVERY 4 HOURS PRN
COMMUNITY
End: 2017-01-01

## 2017-01-01 RX ORDER — BISACODYL 10 MG
SUPPOSITORY, RECTAL RECTAL
Qty: 12 SUPPOSITORY | Refills: 1 | Status: SHIPPED | OUTPATIENT
Start: 2017-01-01

## 2017-01-01 RX ORDER — ALBUTEROL SULFATE 0.83 MG/ML
1 SOLUTION RESPIRATORY (INHALATION) EVERY 4 HOURS PRN
COMMUNITY
End: 2017-01-01

## 2017-01-01 RX ORDER — ATENOLOL 50 MG/1
50 TABLET ORAL 2 TIMES DAILY
Status: DISCONTINUED | OUTPATIENT
Start: 2017-01-01 | End: 2017-01-01 | Stop reason: HOSPADM

## 2017-01-01 RX ORDER — OXYCODONE HYDROCHLORIDE 5 MG/1
5-10 TABLET ORAL
Status: DISCONTINUED | OUTPATIENT
Start: 2017-01-01 | End: 2017-01-01

## 2017-01-01 RX ORDER — ALBUTEROL SULFATE 90 UG/1
2 AEROSOL, METERED RESPIRATORY (INHALATION) EVERY 4 HOURS PRN
COMMUNITY
End: 2017-01-01

## 2017-01-01 RX ORDER — OXYCODONE HYDROCHLORIDE 5 MG/1
5 TABLET ORAL ONCE
Status: COMPLETED | OUTPATIENT
Start: 2017-01-01 | End: 2017-01-01

## 2017-01-01 RX ORDER — LORAZEPAM 2 MG/ML
0.5 CONCENTRATE ORAL EVERY 4 HOURS PRN
Qty: 30 ML | Refills: 1 | Status: SHIPPED | OUTPATIENT
Start: 2017-01-01 | End: 2017-01-01

## 2017-01-01 RX ORDER — ACETAMINOPHEN 325 MG/1
650 TABLET ORAL EVERY 4 HOURS PRN
Status: DISCONTINUED | OUTPATIENT
Start: 2017-01-01 | End: 2017-01-01 | Stop reason: HOSPADM

## 2017-01-01 RX ORDER — DILTIAZEM HYDROCHLORIDE 30 MG/1
60 TABLET, FILM COATED ORAL EVERY 6 HOURS SCHEDULED
Status: DISCONTINUED | OUTPATIENT
Start: 2017-01-01 | End: 2017-01-01

## 2017-01-01 RX ORDER — BISACODYL 10 MG
10 SUPPOSITORY, RECTAL RECTAL DAILY PRN
Status: DISCONTINUED | OUTPATIENT
Start: 2017-01-01 | End: 2017-01-01 | Stop reason: HOSPADM

## 2017-01-01 RX ORDER — AMOXICILLIN 250 MG
1-2 CAPSULE ORAL 2 TIMES DAILY PRN
Status: DISCONTINUED | OUTPATIENT
Start: 2017-01-01 | End: 2017-01-01 | Stop reason: HOSPADM

## 2017-01-01 RX ORDER — ACETAMINOPHEN 500 MG
1000 TABLET ORAL ONCE
Status: COMPLETED | OUTPATIENT
Start: 2017-01-01 | End: 2017-01-01

## 2017-01-01 RX ORDER — DILTIAZEM HYDROCHLORIDE 5 MG/ML
10 INJECTION INTRAVENOUS ONCE
Status: COMPLETED | OUTPATIENT
Start: 2017-01-01 | End: 2017-01-01

## 2017-01-01 RX ORDER — GABAPENTIN 600 MG/1
600 TABLET ORAL AT BEDTIME
Status: DISCONTINUED | OUTPATIENT
Start: 2017-01-01 | End: 2017-01-01 | Stop reason: HOSPADM

## 2017-01-01 RX ORDER — ACETAMINOPHEN 500 MG
1000 TABLET ORAL EVERY 6 HOURS SCHEDULED
Status: DISCONTINUED | OUTPATIENT
Start: 2017-01-01 | End: 2017-01-01 | Stop reason: HOSPADM

## 2017-01-01 RX ORDER — ACETAMINOPHEN 650 MG/1
650 SUPPOSITORY RECTAL EVERY 4 HOURS PRN
Qty: 12 SUPPOSITORY | Refills: 1 | DISCHARGE
Start: 2017-01-01 | End: 2017-01-01

## 2017-01-01 RX ORDER — POLYETHYLENE GLYCOL 3350 17 G/17G
17 POWDER, FOR SOLUTION ORAL DAILY PRN
Status: DISCONTINUED | OUTPATIENT
Start: 2017-01-01 | End: 2017-01-01 | Stop reason: HOSPADM

## 2017-01-01 RX ORDER — OXYCODONE HYDROCHLORIDE 5 MG/1
5 TABLET ORAL
Qty: 18 TABLET | Refills: 0 | Status: SHIPPED | DISCHARGE
Start: 2017-01-01 | End: 2017-01-01

## 2017-01-01 RX ORDER — AMOXICILLIN 500 MG
1200 CAPSULE ORAL DAILY
Status: ON HOLD | COMMUNITY
End: 2017-01-01

## 2017-01-01 RX ORDER — ACETAMINOPHEN 500 MG
1000 TABLET ORAL 2 TIMES DAILY
COMMUNITY
End: 2017-01-01

## 2017-01-01 RX ORDER — LORAZEPAM 0.5 MG/1
0.5 TABLET ORAL 2 TIMES DAILY PRN
Status: ON HOLD | COMMUNITY
End: 2017-01-01

## 2017-01-01 RX ORDER — OXYCODONE HYDROCHLORIDE 5 MG/1
5 TABLET ORAL EVERY 4 HOURS
Qty: 30 TABLET | Refills: 0 | Status: SHIPPED | DISCHARGE
Start: 2017-01-01

## 2017-01-01 RX ORDER — PREDNISONE 20 MG/1
20 TABLET ORAL DAILY
Status: DISCONTINUED | OUTPATIENT
Start: 2017-01-01 | End: 2017-01-01

## 2017-01-01 RX ORDER — ATENOLOL 25 MG/1
25 TABLET ORAL 2 TIMES DAILY
Status: DISCONTINUED | OUTPATIENT
Start: 2017-01-01 | End: 2017-01-01

## 2017-01-01 RX ORDER — FENTANYL CITRATE 50 UG/ML
25 INJECTION, SOLUTION INTRAMUSCULAR; INTRAVENOUS ONCE
Status: COMPLETED | OUTPATIENT
Start: 2017-01-01 | End: 2017-01-01

## 2017-01-01 RX ORDER — ALBUTEROL SULFATE 90 UG/1
1 AEROSOL, METERED RESPIRATORY (INHALATION) EVERY 4 HOURS PRN
Status: DISCONTINUED | OUTPATIENT
Start: 2017-01-01 | End: 2017-01-01

## 2017-01-01 RX ORDER — ALBUTEROL SULFATE 0.83 MG/ML
2.5 SOLUTION RESPIRATORY (INHALATION) EVERY 4 HOURS PRN
Status: DISCONTINUED | OUTPATIENT
Start: 2017-01-01 | End: 2017-01-01 | Stop reason: HOSPADM

## 2017-01-01 RX ORDER — ALBUTEROL SULFATE 90 UG/1
1 AEROSOL, METERED RESPIRATORY (INHALATION) EVERY 4 HOURS PRN
Status: DISCONTINUED | OUTPATIENT
Start: 2017-01-01 | End: 2017-01-01 | Stop reason: HOSPADM

## 2017-01-01 RX ORDER — TROLAMINE SALICYLATE 10 G/100G
CREAM TOPICAL 2 TIMES DAILY
COMMUNITY

## 2017-01-01 RX ORDER — ALBUTEROL SULFATE 90 UG/1
2 AEROSOL, METERED RESPIRATORY (INHALATION) 2 TIMES DAILY PRN
COMMUNITY

## 2017-01-01 RX ORDER — POTASSIUM CHLORIDE 1500 MG/1
20-40 TABLET, EXTENDED RELEASE ORAL
Status: DISCONTINUED | OUTPATIENT
Start: 2017-01-01 | End: 2017-01-01

## 2017-01-01 RX ORDER — HYDROCODONE BITARTRATE AND ACETAMINOPHEN 5; 325 MG/1; MG/1
1 TABLET ORAL ONCE
Status: COMPLETED | OUTPATIENT
Start: 2017-01-01 | End: 2017-01-01

## 2017-01-01 RX ORDER — AMOXICILLIN 250 MG
1 CAPSULE ORAL 2 TIMES DAILY PRN
COMMUNITY
End: 2017-01-01

## 2017-01-01 RX ORDER — AMOXICILLIN 250 MG
1 CAPSULE ORAL DAILY PRN
COMMUNITY

## 2017-01-01 RX ORDER — PROCHLORPERAZINE 25 MG
12.5 SUPPOSITORY, RECTAL RECTAL EVERY 12 HOURS PRN
Status: DISCONTINUED | OUTPATIENT
Start: 2017-01-01 | End: 2017-01-01 | Stop reason: HOSPADM

## 2017-01-01 RX ORDER — CETIRIZINE HYDROCHLORIDE, PSEUDOEPHEDRINE HYDROCHLORIDE 5; 120 MG/1; MG/1
1 TABLET, FILM COATED, EXTENDED RELEASE ORAL 2 TIMES DAILY PRN
Status: ON HOLD | COMMUNITY
End: 2017-01-01

## 2017-01-01 RX ORDER — ALBUTEROL SULFATE 90 UG/1
2 AEROSOL, METERED RESPIRATORY (INHALATION) 2 TIMES DAILY PRN
Status: DISCONTINUED | OUTPATIENT
Start: 2017-01-01 | End: 2017-01-01 | Stop reason: HOSPADM

## 2017-01-01 RX ORDER — AMOXICILLIN 500 MG
1 CAPSULE ORAL DAILY
COMMUNITY
End: 2017-01-01

## 2017-01-01 RX ORDER — LORAZEPAM 0.5 MG/1
0.5 TABLET ORAL EVERY 4 HOURS PRN
Qty: 30 TABLET | Refills: 1 | Status: SHIPPED | OUTPATIENT
Start: 2017-01-01

## 2017-01-01 RX ORDER — ACETAMINOPHEN 650 MG/1
650 SUPPOSITORY RECTAL EVERY 4 HOURS PRN
Qty: 12 SUPPOSITORY | Refills: 1 | Status: SHIPPED | OUTPATIENT
Start: 2017-01-01

## 2017-01-01 RX ORDER — CETIRIZINE HYDROCHLORIDE 10 MG/1
10 TABLET ORAL DAILY PRN
Status: DISCONTINUED | OUTPATIENT
Start: 2017-01-01 | End: 2017-01-01 | Stop reason: HOSPADM

## 2017-01-01 RX ORDER — MENTHOL AND ZINC OXIDE .44; 20.625 G/100G; G/100G
OINTMENT TOPICAL
COMMUNITY

## 2017-01-01 RX ORDER — LIDOCAINE 50 MG/G
2 PATCH TOPICAL ONCE
Status: COMPLETED | OUTPATIENT
Start: 2017-01-01 | End: 2017-01-01

## 2017-01-01 RX ORDER — HYDROXYZINE HYDROCHLORIDE 10 MG/1
10 TABLET, FILM COATED ORAL 3 TIMES DAILY PRN
Status: DISCONTINUED | OUTPATIENT
Start: 2017-01-01 | End: 2017-01-01 | Stop reason: HOSPADM

## 2017-01-01 RX ORDER — TRAMADOL HYDROCHLORIDE 50 MG/1
50 TABLET ORAL ONCE
Status: COMPLETED | OUTPATIENT
Start: 2017-01-01 | End: 2017-01-01

## 2017-01-01 RX ORDER — HYDROCODONE BITARTRATE AND ACETAMINOPHEN 5; 325 MG/1; MG/1
1 TABLET ORAL AT BEDTIME
Status: ON HOLD | COMMUNITY
End: 2017-01-01

## 2017-01-01 RX ORDER — AMOXICILLIN 500 MG
1 CAPSULE ORAL EVERY MORNING
COMMUNITY
End: 2017-01-01

## 2017-01-01 RX ORDER — FAMOTIDINE 20 MG
1000 TABLET ORAL DAILY
COMMUNITY
End: 2017-01-01

## 2017-01-01 RX ORDER — AMOXICILLIN 250 MG
1 CAPSULE ORAL DAILY
COMMUNITY

## 2017-01-01 RX ORDER — PREDNISONE 5 MG/1
TABLET ORAL
Qty: 84 TABLET | DISCHARGE
Start: 2017-01-01

## 2017-01-01 RX ORDER — ATROPINE SULFATE 10 MG/ML
2 SOLUTION/ DROPS OPHTHALMIC
Qty: 5 ML | Refills: 1 | Status: SHIPPED | OUTPATIENT
Start: 2017-01-01

## 2017-01-01 RX ORDER — LORAZEPAM 2 MG/ML
.25-.5 CONCENTRATE ORAL EVERY 6 HOURS PRN
Status: DISCONTINUED | OUTPATIENT
Start: 2017-01-01 | End: 2017-01-01 | Stop reason: HOSPADM

## 2017-01-01 RX ORDER — POTASSIUM CL/LIDO/0.9 % NACL 10MEQ/0.1L
10 INTRAVENOUS SOLUTION, PIGGYBACK (ML) INTRAVENOUS
Status: DISCONTINUED | OUTPATIENT
Start: 2017-01-01 | End: 2017-01-01

## 2017-01-01 RX ORDER — OXYCODONE HCL 10 MG/1
10 TABLET, FILM COATED, EXTENDED RELEASE ORAL EVERY 12 HOURS
Status: DISCONTINUED | OUTPATIENT
Start: 2017-01-01 | End: 2017-01-01

## 2017-01-01 RX ORDER — ATENOLOL 50 MG/1
50 TABLET ORAL 2 TIMES DAILY
Qty: 30 TABLET | Refills: 1 | Status: SHIPPED | OUTPATIENT
Start: 2017-01-01 | End: 2017-01-01

## 2017-01-01 RX ORDER — DILTIAZEM HYDROCHLORIDE 30 MG/1
90 TABLET, FILM COATED ORAL EVERY 6 HOURS SCHEDULED
Status: DISCONTINUED | OUTPATIENT
Start: 2017-01-01 | End: 2017-01-01

## 2017-01-01 RX ORDER — BENZONATATE 100 MG/1
100 CAPSULE ORAL 3 TIMES DAILY PRN
Status: DISCONTINUED | OUTPATIENT
Start: 2017-01-01 | End: 2017-01-01 | Stop reason: HOSPADM

## 2017-01-01 RX ORDER — MULTIPLE VITAMINS W/ MINERALS TAB 9MG-400MCG
1 TAB ORAL DAILY
Status: DISCONTINUED | OUTPATIENT
Start: 2017-01-01 | End: 2017-01-01 | Stop reason: HOSPADM

## 2017-01-01 RX ORDER — ATENOLOL 25 MG/1
50 TABLET ORAL 2 TIMES DAILY
COMMUNITY

## 2017-01-01 RX ORDER — WARFARIN SODIUM 2.5 MG/1
2.5 TABLET ORAL ONCE
Status: COMPLETED | OUTPATIENT
Start: 2017-01-01 | End: 2017-01-01

## 2017-01-01 RX ORDER — LORAZEPAM 0.5 MG/1
0.5 TABLET ORAL 2 TIMES DAILY PRN
COMMUNITY
End: 2017-01-01

## 2017-01-01 RX ORDER — CETIRIZINE HYDROCHLORIDE 10 MG/1
10 TABLET ORAL DAILY
COMMUNITY

## 2017-01-01 RX ORDER — ACYCLOVIR 200 MG/1
3 CAPSULE ORAL ONCE
Status: DISCONTINUED | OUTPATIENT
Start: 2017-01-01 | End: 2017-01-01 | Stop reason: CLARIF

## 2017-01-01 RX ORDER — GUAIFENESIN/DEXTROMETHORPHAN 100-10MG/5
5 SYRUP ORAL EVERY 4 HOURS PRN
Status: DISCONTINUED | OUTPATIENT
Start: 2017-01-01 | End: 2017-01-01 | Stop reason: HOSPADM

## 2017-01-01 RX ORDER — WARFARIN SODIUM 2.5 MG/1
2.5 TABLET ORAL
Status: COMPLETED | OUTPATIENT
Start: 2017-01-01 | End: 2017-01-01

## 2017-01-01 RX ORDER — OXYCODONE HYDROCHLORIDE 5 MG/1
5 TABLET ORAL EVERY 4 HOURS PRN
Status: DISCONTINUED | OUTPATIENT
Start: 2017-01-01 | End: 2017-01-01

## 2017-01-01 RX ORDER — OXYCODONE HYDROCHLORIDE 100 MG/5ML
7.6 SOLUTION ORAL EVERY 4 HOURS
Status: DISCONTINUED | OUTPATIENT
Start: 2017-01-01 | End: 2017-01-01 | Stop reason: HOSPADM

## 2017-01-01 RX ORDER — GABAPENTIN 300 MG/1
600 CAPSULE ORAL AT BEDTIME
COMMUNITY
End: 2017-01-01

## 2017-01-01 RX ORDER — CEFTRIAXONE SODIUM 1 G/50ML
1 INJECTION, SOLUTION INTRAVENOUS ONCE
Status: DISCONTINUED | OUTPATIENT
Start: 2017-01-01 | End: 2017-01-01

## 2017-01-01 RX ORDER — LANOLIN ALCOHOL/MO/W.PET/CERES
3 CREAM (GRAM) TOPICAL
DISCHARGE
Start: 2017-01-01

## 2017-01-01 RX ORDER — ATENOLOL 25 MG/1
50 TABLET ORAL 2 TIMES DAILY
Status: DISCONTINUED | OUTPATIENT
Start: 2017-01-01 | End: 2017-01-01 | Stop reason: HOSPADM

## 2017-01-01 RX ORDER — GABAPENTIN 300 MG/1
600 CAPSULE ORAL AT BEDTIME
Status: DISCONTINUED | OUTPATIENT
Start: 2017-01-01 | End: 2017-01-01 | Stop reason: HOSPADM

## 2017-01-01 RX ORDER — CYCLOBENZAPRINE HCL 10 MG
10 TABLET ORAL 3 TIMES DAILY
Status: DISCONTINUED | OUTPATIENT
Start: 2017-01-01 | End: 2017-01-01 | Stop reason: HOSPADM

## 2017-01-01 RX ORDER — LORAZEPAM 0.5 MG/1
0.5 TABLET ORAL 2 TIMES DAILY PRN
Status: DISCONTINUED | OUTPATIENT
Start: 2017-01-01 | End: 2017-01-01 | Stop reason: HOSPADM

## 2017-01-01 RX ORDER — HYDROMORPHONE HYDROCHLORIDE 1 MG/ML
0.5 INJECTION, SOLUTION INTRAMUSCULAR; INTRAVENOUS; SUBCUTANEOUS
Status: DISCONTINUED | OUTPATIENT
Start: 2017-01-01 | End: 2017-01-01

## 2017-01-01 RX ORDER — MAGNESIUM SULFATE HEPTAHYDRATE 40 MG/ML
4 INJECTION, SOLUTION INTRAVENOUS EVERY 4 HOURS PRN
Status: DISCONTINUED | OUTPATIENT
Start: 2017-01-01 | End: 2017-01-01 | Stop reason: HOSPADM

## 2017-01-01 RX ORDER — HYDROCODONE BITARTRATE AND ACETAMINOPHEN 5; 325 MG/1; MG/1
1 TABLET ORAL EVERY 6 HOURS PRN
Status: ON HOLD | COMMUNITY
End: 2017-01-01

## 2017-01-01 RX ORDER — ATENOLOL 25 MG/1
25 TABLET ORAL 2 TIMES DAILY
Status: DISCONTINUED | OUTPATIENT
Start: 2017-01-01 | End: 2017-01-01 | Stop reason: HOSPADM

## 2017-01-01 RX ORDER — FLUTICASONE PROPIONATE 50 MCG
2 SPRAY, SUSPENSION (ML) NASAL DAILY
COMMUNITY

## 2017-01-01 RX ORDER — AMITRIPTYLINE HYDROCHLORIDE 50 MG/1
50 TABLET ORAL AT BEDTIME
Status: DISCONTINUED | OUTPATIENT
Start: 2017-01-01 | End: 2017-01-01 | Stop reason: HOSPADM

## 2017-01-01 RX ORDER — SODIUM CHLORIDE 9 MG/ML
INJECTION, SOLUTION INTRAVENOUS CONTINUOUS
Status: DISCONTINUED | OUTPATIENT
Start: 2017-01-01 | End: 2017-01-01

## 2017-01-01 RX ORDER — ATENOLOL 50 MG/1
50 TABLET ORAL 2 TIMES DAILY
Status: DISCONTINUED | OUTPATIENT
Start: 2017-01-01 | End: 2017-01-01

## 2017-01-01 RX ORDER — METOPROLOL TARTRATE 1 MG/ML
2.5 INJECTION, SOLUTION INTRAVENOUS EVERY 4 HOURS PRN
Status: DISCONTINUED | OUTPATIENT
Start: 2017-01-01 | End: 2017-01-01

## 2017-01-01 RX ORDER — PROCHLORPERAZINE MALEATE 5 MG
5 TABLET ORAL EVERY 6 HOURS PRN
Status: DISCONTINUED | OUTPATIENT
Start: 2017-01-01 | End: 2017-01-01 | Stop reason: HOSPADM

## 2017-01-01 RX ORDER — CETIRIZINE HYDROCHLORIDE, PSEUDOEPHEDRINE HYDROCHLORIDE 5; 120 MG/1; MG/1
1 TABLET, FILM COATED, EXTENDED RELEASE ORAL EVERY 12 HOURS PRN
Qty: 28 TABLET | Refills: 0 | Status: SHIPPED | OUTPATIENT
Start: 2017-01-01 | End: 2017-01-01

## 2017-01-01 RX ORDER — GUAIFENESIN/DEXTROMETHORPHAN 100-10MG/5
5 SYRUP ORAL EVERY 4 HOURS PRN
COMMUNITY
End: 2017-01-01

## 2017-01-01 RX ORDER — POTASSIUM CHLORIDE 7.45 MG/ML
10 INJECTION INTRAVENOUS
Status: DISCONTINUED | OUTPATIENT
Start: 2017-01-01 | End: 2017-01-01

## 2017-01-01 RX ORDER — WARFARIN SODIUM 2.5 MG/1
TABLET ORAL DAILY
COMMUNITY
End: 2017-01-01

## 2017-01-01 RX ORDER — DILTIAZEM HYDROCHLORIDE 30 MG/1
30 TABLET, FILM COATED ORAL ONCE
Status: COMPLETED | OUTPATIENT
Start: 2017-01-01 | End: 2017-01-01

## 2017-01-01 RX ORDER — ACETAMINOPHEN 500 MG
1000 TABLET ORAL 3 TIMES DAILY
Qty: 100 TABLET | Refills: 0 | COMMUNITY
Start: 2017-01-01 | End: 2017-01-01

## 2017-01-01 RX ORDER — METHYLPREDNISOLONE SODIUM SUCCINATE 125 MG/2ML
60 INJECTION, POWDER, LYOPHILIZED, FOR SOLUTION INTRAMUSCULAR; INTRAVENOUS ONCE
Status: COMPLETED | OUTPATIENT
Start: 2017-01-01 | End: 2017-01-01

## 2017-01-01 RX ORDER — HYDROMORPHONE HYDROCHLORIDE 1 MG/ML
0.5 INJECTION, SOLUTION INTRAMUSCULAR; INTRAVENOUS; SUBCUTANEOUS
Status: COMPLETED | OUTPATIENT
Start: 2017-01-01 | End: 2017-01-01

## 2017-01-01 RX ORDER — DILTIAZEM HYDROCHLORIDE 30 MG/1
30 TABLET, FILM COATED ORAL EVERY 6 HOURS SCHEDULED
Status: DISCONTINUED | OUTPATIENT
Start: 2017-01-01 | End: 2017-01-01

## 2017-01-01 RX ORDER — POTASSIUM CHLORIDE 29.8 MG/ML
20 INJECTION INTRAVENOUS
Status: DISCONTINUED | OUTPATIENT
Start: 2017-01-01 | End: 2017-01-01

## 2017-01-01 RX ORDER — CEFTRIAXONE SODIUM 1 G/50ML
1 INJECTION, SOLUTION INTRAVENOUS EVERY 24 HOURS
Status: DISCONTINUED | OUTPATIENT
Start: 2017-01-01 | End: 2017-01-01

## 2017-01-01 RX ORDER — METOPROLOL TARTRATE 1 MG/ML
5 INJECTION, SOLUTION INTRAVENOUS EVERY 4 HOURS PRN
Status: DISCONTINUED | OUTPATIENT
Start: 2017-01-01 | End: 2017-01-01

## 2017-01-01 RX ORDER — CETIRIZINE HYDROCHLORIDE 10 MG/1
10 TABLET ORAL DAILY PRN
COMMUNITY
End: 2017-01-01

## 2017-01-01 RX ADMIN — OXYCODONE HYDROCHLORIDE 5 MG: 5 TABLET ORAL at 15:53

## 2017-01-01 RX ADMIN — Medication 0.5 MG: at 23:38

## 2017-01-01 RX ADMIN — GABAPENTIN 600 MG: 600 TABLET, FILM COATED ORAL at 01:13

## 2017-01-01 RX ADMIN — MORPHINE SULFATE 15 MG: 15 TABLET, EXTENDED RELEASE ORAL at 19:37

## 2017-01-01 RX ADMIN — ATENOLOL 75 MG: 50 TABLET ORAL at 09:22

## 2017-01-01 RX ADMIN — LATANOPROST 1 DROP: 50 SOLUTION/ DROPS OPHTHALMIC at 21:00

## 2017-01-01 RX ADMIN — ATENOLOL 75 MG: 50 TABLET ORAL at 21:43

## 2017-01-01 RX ADMIN — SENNOSIDES AND DOCUSATE SODIUM 1 TABLET: 8.6; 5 TABLET ORAL at 09:09

## 2017-01-01 RX ADMIN — WARFARIN SODIUM 2.5 MG: 2.5 TABLET ORAL at 21:37

## 2017-01-01 RX ADMIN — OXYCODONE HYDROCHLORIDE 5 MG: 5 TABLET ORAL at 02:53

## 2017-01-01 RX ADMIN — MORPHINE SULFATE 15 MG: 15 TABLET, EXTENDED RELEASE ORAL at 20:06

## 2017-01-01 RX ADMIN — OXYCODONE HYDROCHLORIDE 10 MG: 5 TABLET ORAL at 16:25

## 2017-01-01 RX ADMIN — ACETAMINOPHEN 650 MG: 325 TABLET, FILM COATED ORAL at 21:01

## 2017-01-01 RX ADMIN — SENNOSIDES AND DOCUSATE SODIUM 1 TABLET: 8.6; 5 TABLET ORAL at 10:43

## 2017-01-01 RX ADMIN — DICLOFENAC SODIUM 2 G: 10 GEL TOPICAL at 09:26

## 2017-01-01 RX ADMIN — ACETAMINOPHEN 1000 MG: 500 TABLET, FILM COATED ORAL at 23:46

## 2017-01-01 RX ADMIN — ATENOLOL 25 MG: 25 TABLET ORAL at 00:06

## 2017-01-01 RX ADMIN — SODIUM CHLORIDE 500 ML: 9 INJECTION, SOLUTION INTRAVENOUS at 20:53

## 2017-01-01 RX ADMIN — OMEPRAZOLE 20 MG: 20 CAPSULE, DELAYED RELEASE ORAL at 06:54

## 2017-01-01 RX ADMIN — GABAPENTIN 600 MG: 600 TABLET, FILM COATED ORAL at 21:20

## 2017-01-01 RX ADMIN — SODIUM CHLORIDE 500 ML: 9 INJECTION, SOLUTION INTRAVENOUS at 17:17

## 2017-01-01 RX ADMIN — ATENOLOL 50 MG: 50 TABLET ORAL at 21:14

## 2017-01-01 RX ADMIN — PHYTONADIONE 2.5 MG: 10 INJECTION, EMULSION INTRAMUSCULAR; INTRAVENOUS; SUBCUTANEOUS at 09:50

## 2017-01-01 RX ADMIN — DILTIAZEM HYDROCHLORIDE 5 MG/HR: 5 INJECTION INTRAVENOUS at 02:46

## 2017-01-01 RX ADMIN — ACETAMINOPHEN 650 MG: 325 TABLET, FILM COATED ORAL at 02:46

## 2017-01-01 RX ADMIN — AMITRIPTYLINE HYDROCHLORIDE 50 MG: 50 TABLET, FILM COATED ORAL at 21:33

## 2017-01-01 RX ADMIN — DICLOFENAC SODIUM 2 G: 10 GEL TOPICAL at 18:50

## 2017-01-01 RX ADMIN — ACETAMINOPHEN 650 MG: 325 TABLET, FILM COATED ORAL at 12:00

## 2017-01-01 RX ADMIN — DICLOFENAC SODIUM 2 G: 10 GEL TOPICAL at 21:11

## 2017-01-01 RX ADMIN — Medication 0.5 MG: at 02:33

## 2017-01-01 RX ADMIN — DILTIAZEM HYDROCHLORIDE 60 MG: 30 TABLET, FILM COATED ORAL at 23:42

## 2017-01-01 RX ADMIN — Medication 0.5 MG: at 08:49

## 2017-01-01 RX ADMIN — ACETAMINOPHEN 1000 MG: 500 TABLET, FILM COATED ORAL at 22:14

## 2017-01-01 RX ADMIN — MORPHINE SULFATE 15 MG: 15 TABLET, EXTENDED RELEASE ORAL at 09:12

## 2017-01-01 RX ADMIN — ATENOLOL 75 MG: 50 TABLET ORAL at 09:09

## 2017-01-01 RX ADMIN — GABAPENTIN 600 MG: 600 TABLET, FILM COATED ORAL at 21:00

## 2017-01-01 RX ADMIN — Medication 0.2 MG: at 08:25

## 2017-01-01 RX ADMIN — GABAPENTIN 600 MG: 600 TABLET, FILM COATED ORAL at 21:44

## 2017-01-01 RX ADMIN — ACETAMINOPHEN 1000 MG: 500 TABLET, FILM COATED ORAL at 20:48

## 2017-01-01 RX ADMIN — ACETAMINOPHEN 650 MG: 325 TABLET, FILM COATED ORAL at 03:12

## 2017-01-01 RX ADMIN — DICLOFENAC SODIUM 2 G: 10 GEL TOPICAL at 22:13

## 2017-01-01 RX ADMIN — LORAZEPAM 0.26 MG: 2 LIQUID ORAL at 20:26

## 2017-01-01 RX ADMIN — CEPHALEXIN 500 MG: 500 CAPSULE ORAL at 01:10

## 2017-01-01 RX ADMIN — OXYCODONE HYDROCHLORIDE 5 MG: 100 SOLUTION ORAL at 06:35

## 2017-01-01 RX ADMIN — OXYCODONE HYDROCHLORIDE 10 MG: 5 TABLET ORAL at 21:43

## 2017-01-01 RX ADMIN — ATENOLOL 25 MG: 25 TABLET ORAL at 20:12

## 2017-01-01 RX ADMIN — Medication 0.2 MG: at 23:04

## 2017-01-01 RX ADMIN — CEFTRIAXONE SODIUM 1 G: 1 INJECTION, SOLUTION INTRAVENOUS at 20:07

## 2017-01-01 RX ADMIN — OMEPRAZOLE 20 MG: 20 CAPSULE, DELAYED RELEASE ORAL at 06:50

## 2017-01-01 RX ADMIN — DICLOFENAC SODIUM 2 G: 10 GEL TOPICAL at 12:48

## 2017-01-01 RX ADMIN — ACETAMINOPHEN 1000 MG: 500 TABLET, FILM COATED ORAL at 09:36

## 2017-01-01 RX ADMIN — FLUTICASONE FUROATE AND VILANTEROL TRIFENATATE 1 PUFF: 100; 25 POWDER RESPIRATORY (INHALATION) at 08:03

## 2017-01-01 RX ADMIN — DILTIAZEM HYDROCHLORIDE 5 MG/HR: 5 INJECTION INTRAVENOUS at 20:47

## 2017-01-01 RX ADMIN — Medication 0.2 MG: at 10:47

## 2017-01-01 RX ADMIN — LATANOPROST 1 DROP: 50 SOLUTION/ DROPS OPHTHALMIC at 20:50

## 2017-01-01 RX ADMIN — OXYCODONE HYDROCHLORIDE 5 MG: 5 TABLET ORAL at 02:03

## 2017-01-01 RX ADMIN — DICLOFENAC SODIUM 2 G: 10 GEL TOPICAL at 17:21

## 2017-01-01 RX ADMIN — OXYCODONE HYDROCHLORIDE 5 MG: 5 TABLET ORAL at 16:06

## 2017-01-01 RX ADMIN — SODIUM CHLORIDE: 9 INJECTION, SOLUTION INTRAVENOUS at 15:57

## 2017-01-01 RX ADMIN — LATANOPROST 1 DROP: 50 SOLUTION/ DROPS OPHTHALMIC at 23:15

## 2017-01-01 RX ADMIN — ATENOLOL 50 MG: 50 TABLET ORAL at 08:30

## 2017-01-01 RX ADMIN — FLUTICASONE FUROATE AND VILANTEROL TRIFENATATE 1 PUFF: 100; 25 POWDER RESPIRATORY (INHALATION) at 07:58

## 2017-01-01 RX ADMIN — POLYETHYLENE GLYCOL 3350 17 G: 17 POWDER, FOR SOLUTION ORAL at 16:05

## 2017-01-01 RX ADMIN — SENNOSIDES AND DOCUSATE SODIUM 1 TABLET: 8.6; 5 TABLET ORAL at 08:30

## 2017-01-01 RX ADMIN — FLUTICASONE FUROATE AND VILANTEROL TRIFENATATE 1 PUFF: 100; 25 POWDER RESPIRATORY (INHALATION) at 08:39

## 2017-01-01 RX ADMIN — Medication 0.5 MG: at 21:03

## 2017-01-01 RX ADMIN — OMEPRAZOLE 20 MG: 20 CAPSULE, DELAYED RELEASE ORAL at 06:36

## 2017-01-01 RX ADMIN — DILTIAZEM HYDROCHLORIDE 60 MG: 30 TABLET, FILM COATED ORAL at 05:55

## 2017-01-01 RX ADMIN — ACETAMINOPHEN 1000 MG: 500 TABLET, FILM COATED ORAL at 06:03

## 2017-01-01 RX ADMIN — LATANOPROST 1 DROP: 50 SOLUTION/ DROPS OPHTHALMIC at 20:07

## 2017-01-01 RX ADMIN — DICLOFENAC SODIUM 2 G: 10 GEL TOPICAL at 13:08

## 2017-01-01 RX ADMIN — OXYCODONE HYDROCHLORIDE 10 MG: 5 TABLET ORAL at 02:25

## 2017-01-01 RX ADMIN — SODIUM CHLORIDE: 9 INJECTION, SOLUTION INTRAVENOUS at 23:22

## 2017-01-01 RX ADMIN — GABAPENTIN 600 MG: 600 TABLET, FILM COATED ORAL at 21:32

## 2017-01-01 RX ADMIN — OXYCODONE HYDROCHLORIDE 5 MG: 5 TABLET ORAL at 22:45

## 2017-01-01 RX ADMIN — OXYCODONE HYDROCHLORIDE 10 MG: 5 TABLET ORAL at 05:53

## 2017-01-01 RX ADMIN — MORPHINE SULFATE 15 MG: 15 TABLET, EXTENDED RELEASE ORAL at 09:35

## 2017-01-01 RX ADMIN — DICLOFENAC SODIUM 2 G: 10 GEL TOPICAL at 21:33

## 2017-01-01 RX ADMIN — Medication 0.5 MG: at 18:34

## 2017-01-01 RX ADMIN — DICLOFENAC SODIUM 2 G: 10 GEL TOPICAL at 14:37

## 2017-01-01 RX ADMIN — OXYCODONE HYDROCHLORIDE 5 MG: 5 TABLET ORAL at 16:26

## 2017-01-01 RX ADMIN — OXYCODONE HYDROCHLORIDE 7.6 MG: 100 SOLUTION ORAL at 10:43

## 2017-01-01 RX ADMIN — NAPROXEN 500 MG: 500 TABLET ORAL at 00:11

## 2017-01-01 RX ADMIN — ATENOLOL 50 MG: 50 TABLET ORAL at 22:13

## 2017-01-01 RX ADMIN — ACETAMINOPHEN 1000 MG: 500 TABLET, FILM COATED ORAL at 17:24

## 2017-01-01 RX ADMIN — PREDNISONE 15 MG: 10 TABLET ORAL at 07:54

## 2017-01-01 RX ADMIN — ATENOLOL 50 MG: 50 TABLET ORAL at 20:26

## 2017-01-01 RX ADMIN — OMEPRAZOLE 20 MG: 20 CAPSULE, DELAYED RELEASE ORAL at 07:27

## 2017-01-01 RX ADMIN — ACETAMINOPHEN 650 MG: 325 TABLET, FILM COATED ORAL at 09:09

## 2017-01-01 RX ADMIN — WARFARIN SODIUM 1 MG: 1 TABLET ORAL at 17:18

## 2017-01-01 RX ADMIN — ATENOLOL 50 MG: 50 TABLET ORAL at 01:12

## 2017-01-01 RX ADMIN — Medication 0.5 MG: at 17:11

## 2017-01-01 RX ADMIN — CEFTRIAXONE SODIUM 1 G: 1 INJECTION, SOLUTION INTRAVENOUS at 20:54

## 2017-01-01 RX ADMIN — ATENOLOL 50 MG: 50 TABLET ORAL at 20:46

## 2017-01-01 RX ADMIN — DICLOFENAC SODIUM 2 G: 10 GEL TOPICAL at 16:50

## 2017-01-01 RX ADMIN — Medication 0.5 MG: at 03:28

## 2017-01-01 RX ADMIN — ATENOLOL 50 MG: 50 TABLET ORAL at 09:36

## 2017-01-01 RX ADMIN — ATENOLOL 75 MG: 50 TABLET ORAL at 09:12

## 2017-01-01 RX ADMIN — Medication 0.5 MG: at 11:13

## 2017-01-01 RX ADMIN — ACETAMINOPHEN 650 MG: 325 TABLET, FILM COATED ORAL at 12:47

## 2017-01-01 RX ADMIN — LIDOCAINE 2 PATCH: 50 PATCH CUTANEOUS at 23:34

## 2017-01-01 RX ADMIN — FLUTICASONE FUROATE AND VILANTEROL TRIFENATATE 1 PUFF: 100; 25 POWDER RESPIRATORY (INHALATION) at 08:04

## 2017-01-01 RX ADMIN — Medication 0.5 MG: at 00:56

## 2017-01-01 RX ADMIN — DICLOFENAC SODIUM 2 G: 10 GEL TOPICAL at 17:37

## 2017-01-01 RX ADMIN — PREDNISONE 15 MG: 10 TABLET ORAL at 08:48

## 2017-01-01 RX ADMIN — SODIUM CHLORIDE 500 ML: 9 INJECTION, SOLUTION INTRAVENOUS at 17:21

## 2017-01-01 RX ADMIN — WARFARIN SODIUM 1.25 MG: 2.5 TABLET ORAL at 17:26

## 2017-01-01 RX ADMIN — ACETAMINOPHEN 1000 MG: 500 TABLET, FILM COATED ORAL at 00:07

## 2017-01-01 RX ADMIN — OXYCODONE HYDROCHLORIDE 5 MG: 5 TABLET ORAL at 23:42

## 2017-01-01 RX ADMIN — OXYCODONE HYDROCHLORIDE 5 MG: 5 TABLET ORAL at 21:10

## 2017-01-01 RX ADMIN — ACETAMINOPHEN 650 MG: 325 TABLET, FILM COATED ORAL at 23:42

## 2017-01-01 RX ADMIN — ALBUTEROL SULFATE 1 PUFF: 90 INHALANT RESPIRATORY (INHALATION) at 21:36

## 2017-01-01 RX ADMIN — DICLOFENAC SODIUM 2 G: 10 GEL TOPICAL at 09:05

## 2017-01-01 RX ADMIN — OXYCODONE HYDROCHLORIDE 5 MG: 5 TABLET ORAL at 00:55

## 2017-01-01 RX ADMIN — METHYLPREDNISOLONE SODIUM SUCCINATE 62.5 MG: 125 INJECTION, POWDER, FOR SOLUTION INTRAMUSCULAR; INTRAVENOUS at 12:34

## 2017-01-01 RX ADMIN — OMEPRAZOLE 20 MG: 20 CAPSULE, DELAYED RELEASE ORAL at 06:33

## 2017-01-01 RX ADMIN — PREDNISONE 15 MG: 10 TABLET ORAL at 09:09

## 2017-01-01 RX ADMIN — OXYCODONE HYDROCHLORIDE 5 MG: 5 TABLET ORAL at 04:54

## 2017-01-01 RX ADMIN — OXYCODONE HYDROCHLORIDE 5 MG: 5 TABLET ORAL at 20:59

## 2017-01-01 RX ADMIN — ACETAMINOPHEN 650 MG: 325 TABLET, FILM COATED ORAL at 19:37

## 2017-01-01 RX ADMIN — BISACODYL 10 MG: 10 SUPPOSITORY RECTAL at 16:28

## 2017-01-01 RX ADMIN — DICLOFENAC SODIUM 2 G: 10 GEL TOPICAL at 20:50

## 2017-01-01 RX ADMIN — DICLOFENAC SODIUM 2 G: 10 GEL TOPICAL at 17:55

## 2017-01-01 RX ADMIN — ATENOLOL 50 MG: 50 TABLET ORAL at 21:10

## 2017-01-01 RX ADMIN — FLUTICASONE FUROATE AND VILANTEROL TRIFENATATE 1 PUFF: 100; 25 POWDER RESPIRATORY (INHALATION) at 07:26

## 2017-01-01 RX ADMIN — OXYCODONE HYDROCHLORIDE 10 MG: 5 TABLET ORAL at 00:46

## 2017-01-01 RX ADMIN — METOPROLOL TARTRATE 25 MG: 25 TABLET, FILM COATED ORAL at 17:37

## 2017-01-01 RX ADMIN — Medication 0.2 MG: at 13:04

## 2017-01-01 RX ADMIN — DILTIAZEM HYDROCHLORIDE 30 MG: 30 TABLET, FILM COATED ORAL at 08:08

## 2017-01-01 RX ADMIN — ATENOLOL 50 MG: 50 TABLET ORAL at 20:54

## 2017-01-01 RX ADMIN — OXYCODONE HYDROCHLORIDE 5 MG: 5 TABLET ORAL at 07:55

## 2017-01-01 RX ADMIN — LATANOPROST 1 DROP: 50 SOLUTION/ DROPS OPHTHALMIC at 21:27

## 2017-01-01 RX ADMIN — DICLOFENAC SODIUM 2 G: 10 GEL TOPICAL at 10:24

## 2017-01-01 RX ADMIN — MULTIPLE VITAMINS W/ MINERALS TAB 1 TABLET: TAB at 08:03

## 2017-01-01 RX ADMIN — SENNOSIDES AND DOCUSATE SODIUM 1 TABLET: 8.6; 5 TABLET ORAL at 07:55

## 2017-01-01 RX ADMIN — OMEPRAZOLE 20 MG: 20 CAPSULE, DELAYED RELEASE ORAL at 20:13

## 2017-01-01 RX ADMIN — DICLOFENAC SODIUM 2 G: 10 GEL TOPICAL at 11:03

## 2017-01-01 RX ADMIN — FLUTICASONE FUROATE AND VILANTEROL TRIFENATATE 1 PUFF: 100; 25 POWDER RESPIRATORY (INHALATION) at 08:37

## 2017-01-01 RX ADMIN — ATENOLOL 75 MG: 50 TABLET ORAL at 20:14

## 2017-01-01 RX ADMIN — GUAIFENESIN AND DEXTROMETHORPHAN 5 ML: 100; 10 SYRUP ORAL at 10:32

## 2017-01-01 RX ADMIN — ATENOLOL 25 MG: 25 TABLET ORAL at 21:15

## 2017-01-01 RX ADMIN — GABAPENTIN 600 MG: 600 TABLET, FILM COATED ORAL at 22:13

## 2017-01-01 RX ADMIN — OXYCODONE HYDROCHLORIDE 5 MG: 5 TABLET ORAL at 20:25

## 2017-01-01 RX ADMIN — LATANOPROST 1 DROP: 50 SOLUTION/ DROPS OPHTHALMIC at 21:34

## 2017-01-01 RX ADMIN — OXYCODONE HYDROCHLORIDE 5 MG: 5 TABLET ORAL at 02:41

## 2017-01-01 RX ADMIN — DICLOFENAC SODIUM 2 G: 10 GEL TOPICAL at 21:01

## 2017-01-01 RX ADMIN — OXYCODONE HYDROCHLORIDE 7.5 MG: 5 TABLET ORAL at 18:52

## 2017-01-01 RX ADMIN — PREDNISONE 15 MG: 10 TABLET ORAL at 09:22

## 2017-01-01 RX ADMIN — ATENOLOL 25 MG: 25 TABLET ORAL at 20:48

## 2017-01-01 RX ADMIN — OXYCODONE HYDROCHLORIDE 5 MG: 100 SOLUTION ORAL at 16:49

## 2017-01-01 RX ADMIN — ACETAMINOPHEN 650 MG: 325 TABLET, FILM COATED ORAL at 21:43

## 2017-01-01 RX ADMIN — LATANOPROST 1 DROP: 50 SOLUTION/ DROPS OPHTHALMIC at 21:11

## 2017-01-01 RX ADMIN — DICLOFENAC SODIUM 2 G: 10 GEL TOPICAL at 16:53

## 2017-01-01 RX ADMIN — OXYCODONE HYDROCHLORIDE 5 MG: 5 TABLET ORAL at 01:29

## 2017-01-01 RX ADMIN — DILTIAZEM HYDROCHLORIDE 5 MG/HR: 5 INJECTION INTRAVENOUS at 11:16

## 2017-01-01 RX ADMIN — OXYCODONE HYDROCHLORIDE 5 MG: 100 SOLUTION ORAL at 14:29

## 2017-01-01 RX ADMIN — METHOCARBAMOL 750 MG: 750 TABLET ORAL at 23:32

## 2017-01-01 RX ADMIN — GABAPENTIN 600 MG: 600 TABLET, FILM COATED ORAL at 21:10

## 2017-01-01 RX ADMIN — OXYCODONE HYDROCHLORIDE 2.5 MG: 5 TABLET ORAL at 15:36

## 2017-01-01 RX ADMIN — SENNOSIDES AND DOCUSATE SODIUM 1 TABLET: 8.6; 5 TABLET ORAL at 09:35

## 2017-01-01 RX ADMIN — FLUTICASONE FUROATE AND VILANTEROL TRIFENATATE 1 PUFF: 100; 25 POWDER RESPIRATORY (INHALATION) at 07:45

## 2017-01-01 RX ADMIN — OMEPRAZOLE 20 MG: 20 CAPSULE, DELAYED RELEASE ORAL at 09:36

## 2017-01-01 RX ADMIN — SENNOSIDES AND DOCUSATE SODIUM 1 TABLET: 8.6; 5 TABLET ORAL at 08:48

## 2017-01-01 RX ADMIN — PREDNISONE 15 MG: 10 TABLET ORAL at 08:03

## 2017-01-01 RX ADMIN — PREDNISONE 20 MG: 20 TABLET ORAL at 09:35

## 2017-01-01 RX ADMIN — OXYCODONE HYDROCHLORIDE 5 MG: 100 SOLUTION ORAL at 11:38

## 2017-01-01 RX ADMIN — OMEPRAZOLE 20 MG: 20 CAPSULE, DELAYED RELEASE ORAL at 06:25

## 2017-01-01 RX ADMIN — GABAPENTIN 600 MG: 300 CAPSULE ORAL at 20:48

## 2017-01-01 RX ADMIN — ACETAMINOPHEN 1000 MG: 500 TABLET, FILM COATED ORAL at 13:38

## 2017-01-01 RX ADMIN — ACETAMINOPHEN 1000 MG: 500 TABLET, FILM COATED ORAL at 06:16

## 2017-01-01 RX ADMIN — OXYCODONE HYDROCHLORIDE 5 MG: 5 TABLET ORAL at 17:18

## 2017-01-01 RX ADMIN — ACETAMINOPHEN 650 MG: 325 TABLET, FILM COATED ORAL at 16:18

## 2017-01-01 RX ADMIN — OMEPRAZOLE 20 MG: 20 CAPSULE, DELAYED RELEASE ORAL at 06:31

## 2017-01-01 RX ADMIN — GABAPENTIN 600 MG: 600 TABLET, FILM COATED ORAL at 21:14

## 2017-01-01 RX ADMIN — CYCLOBENZAPRINE HYDROCHLORIDE 10 MG: 10 TABLET, FILM COATED ORAL at 20:12

## 2017-01-01 RX ADMIN — MORPHINE SULFATE 15 MG: 15 TABLET, EXTENDED RELEASE ORAL at 21:44

## 2017-01-01 RX ADMIN — OXYCODONE HYDROCHLORIDE 7.6 MG: 100 SOLUTION ORAL at 18:47

## 2017-01-01 RX ADMIN — Medication 0.5 MG: at 15:56

## 2017-01-01 RX ADMIN — OXYCODONE HYDROCHLORIDE 5 MG: 5 TABLET ORAL at 09:48

## 2017-01-01 RX ADMIN — OXYCODONE HYDROCHLORIDE 10 MG: 5 TABLET ORAL at 21:21

## 2017-01-01 RX ADMIN — OMEPRAZOLE 20 MG: 20 CAPSULE, DELAYED RELEASE ORAL at 05:55

## 2017-01-01 RX ADMIN — DILTIAZEM HYDROCHLORIDE 90 MG: 30 TABLET, FILM COATED ORAL at 13:16

## 2017-01-01 RX ADMIN — ATENOLOL 50 MG: 25 TABLET ORAL at 07:57

## 2017-01-01 RX ADMIN — TRAMADOL HYDROCHLORIDE 50 MG: 50 TABLET, COATED ORAL at 23:32

## 2017-01-01 RX ADMIN — LATANOPROST 1 DROP: 50 SOLUTION/ DROPS OPHTHALMIC at 22:12

## 2017-01-01 RX ADMIN — Medication 0.5 MG: at 05:30

## 2017-01-01 RX ADMIN — AMITRIPTYLINE HYDROCHLORIDE 50 MG: 50 TABLET, FILM COATED ORAL at 20:49

## 2017-01-01 RX ADMIN — ACETAMINOPHEN 650 MG: 325 TABLET, FILM COATED ORAL at 13:38

## 2017-01-01 RX ADMIN — OXYCODONE HYDROCHLORIDE 7.5 MG: 5 TABLET ORAL at 21:10

## 2017-01-01 RX ADMIN — OXYCODONE HYDROCHLORIDE 7.6 MG: 100 SOLUTION ORAL at 21:32

## 2017-01-01 RX ADMIN — DILTIAZEM HYDROCHLORIDE 10 MG/HR: 5 INJECTION INTRAVENOUS at 09:51

## 2017-01-01 RX ADMIN — POTASSIUM CHLORIDE 20 MEQ: 1500 TABLET, EXTENDED RELEASE ORAL at 16:16

## 2017-01-01 RX ADMIN — SENNOSIDES AND DOCUSATE SODIUM 1 TABLET: 8.6; 5 TABLET ORAL at 09:12

## 2017-01-01 RX ADMIN — CEFTRIAXONE SODIUM 1 G: 1 INJECTION, SOLUTION INTRAVENOUS at 21:14

## 2017-01-01 RX ADMIN — OXYCODONE HYDROCHLORIDE 5 MG: 5 TABLET ORAL at 06:24

## 2017-01-01 RX ADMIN — PREDNISONE 20 MG: 20 TABLET ORAL at 08:30

## 2017-01-01 RX ADMIN — OXYCODONE HYDROCHLORIDE 5 MG: 5 TABLET ORAL at 16:04

## 2017-01-01 RX ADMIN — ACETAMINOPHEN 650 MG: 325 TABLET, FILM COATED ORAL at 19:48

## 2017-01-01 RX ADMIN — FLUTICASONE FUROATE AND VILANTEROL TRIFENATATE 1 PUFF: 100; 25 POWDER RESPIRATORY (INHALATION) at 08:32

## 2017-01-01 RX ADMIN — SENNOSIDES AND DOCUSATE SODIUM 1 TABLET: 8.6; 5 TABLET ORAL at 08:57

## 2017-01-01 RX ADMIN — LATANOPROST 1 DROP: 50 SOLUTION/ DROPS OPHTHALMIC at 21:44

## 2017-01-01 RX ADMIN — OXYCODONE HYDROCHLORIDE 7.6 MG: 100 SOLUTION ORAL at 13:00

## 2017-01-01 RX ADMIN — FLUTICASONE FUROATE AND VILANTEROL TRIFENATATE 1 PUFF: 100; 25 POWDER RESPIRATORY (INHALATION) at 08:33

## 2017-01-01 RX ADMIN — Medication 0.3 MG: at 18:01

## 2017-01-01 RX ADMIN — HYDROCODONE BITARTRATE AND ACETAMINOPHEN 1 TABLET: 5; 325 TABLET ORAL at 01:10

## 2017-01-01 RX ADMIN — SODIUM CHLORIDE: 9 INJECTION, SOLUTION INTRAVENOUS at 13:59

## 2017-01-01 RX ADMIN — SODIUM CHLORIDE: 9 INJECTION, SOLUTION INTRAVENOUS at 04:45

## 2017-01-01 RX ADMIN — GABAPENTIN 600 MG: 600 TABLET, FILM COATED ORAL at 20:07

## 2017-01-01 RX ADMIN — ATENOLOL 25 MG: 25 TABLET ORAL at 09:36

## 2017-01-01 RX ADMIN — DILTIAZEM HYDROCHLORIDE 30 MG: 30 TABLET, FILM COATED ORAL at 08:48

## 2017-01-01 RX ADMIN — OXYCODONE HYDROCHLORIDE 10 MG: 5 TABLET ORAL at 21:01

## 2017-01-01 RX ADMIN — DICLOFENAC SODIUM 2 G: 10 GEL TOPICAL at 13:16

## 2017-01-01 RX ADMIN — OXYCODONE HYDROCHLORIDE 10 MG: 5 TABLET ORAL at 02:46

## 2017-01-01 RX ADMIN — OXYCODONE HYDROCHLORIDE 10 MG: 5 TABLET ORAL at 06:30

## 2017-01-01 RX ADMIN — FENTANYL CITRATE 25 MCG: 50 INJECTION, SOLUTION INTRAMUSCULAR; INTRAVENOUS at 14:49

## 2017-01-01 RX ADMIN — OMEPRAZOLE 20 MG: 20 CAPSULE, DELAYED RELEASE ORAL at 06:34

## 2017-01-01 RX ADMIN — DILTIAZEM HYDROCHLORIDE 10 MG: 5 INJECTION INTRAVENOUS at 22:09

## 2017-01-01 RX ADMIN — ACETAMINOPHEN 650 MG: 325 TABLET, FILM COATED ORAL at 09:48

## 2017-01-01 RX ADMIN — Medication 0.5 MG: at 03:38

## 2017-01-01 RX ADMIN — WARFARIN SODIUM 2.5 MG: 2.5 TABLET ORAL at 18:14

## 2017-01-01 RX ADMIN — Medication 0.5 MG: at 18:14

## 2017-01-01 RX ADMIN — ACETAMINOPHEN 650 MG: 325 TABLET, FILM COATED ORAL at 01:34

## 2017-01-01 RX ADMIN — FLUTICASONE FUROATE AND VILANTEROL TRIFENATATE 1 PUFF: 100; 25 POWDER RESPIRATORY (INHALATION) at 08:12

## 2017-01-01 RX ADMIN — Medication 0.5 MG: at 18:01

## 2017-01-01 RX ADMIN — METOPROLOL TARTRATE 5 MG: 5 INJECTION INTRAVENOUS at 17:23

## 2017-01-01 RX ADMIN — OMEPRAZOLE 20 MG: 20 CAPSULE, DELAYED RELEASE ORAL at 06:39

## 2017-01-01 RX ADMIN — OXYCODONE HYDROCHLORIDE 7.6 MG: 100 SOLUTION ORAL at 00:23

## 2017-01-01 RX ADMIN — CYCLOBENZAPRINE HYDROCHLORIDE 10 MG: 10 TABLET, FILM COATED ORAL at 08:59

## 2017-01-01 RX ADMIN — Medication 0.5 MG: at 17:54

## 2017-01-01 RX ADMIN — OXYCODONE HYDROCHLORIDE 5 MG: 5 TABLET ORAL at 01:21

## 2017-01-01 RX ADMIN — Medication 0.5 MG: at 08:29

## 2017-01-01 RX ADMIN — ACETAMINOPHEN 650 MG: 325 TABLET, FILM COATED ORAL at 16:06

## 2017-01-01 RX ADMIN — DICLOFENAC SODIUM 2 G: 10 GEL TOPICAL at 08:54

## 2017-01-01 RX ADMIN — OXYCODONE HYDROCHLORIDE 10 MG: 5 TABLET ORAL at 05:49

## 2017-01-01 RX ADMIN — OXYCODONE HYDROCHLORIDE 5 MG: 5 TABLET ORAL at 06:36

## 2017-01-01 RX ADMIN — Medication 0.5 MG: at 23:24

## 2017-01-01 RX ADMIN — FLUTICASONE FUROATE AND VILANTEROL TRIFENATATE 1 PUFF: 100; 25 POWDER RESPIRATORY (INHALATION) at 08:17

## 2017-01-01 RX ADMIN — OMEPRAZOLE 20 MG: 20 CAPSULE, DELAYED RELEASE ORAL at 08:57

## 2017-01-01 RX ADMIN — SODIUM CHLORIDE 1000 ML: 9 INJECTION, SOLUTION INTRAVENOUS at 03:01

## 2017-01-01 RX ADMIN — ATENOLOL 75 MG: 50 TABLET ORAL at 07:54

## 2017-01-01 RX ADMIN — ATENOLOL 75 MG: 50 TABLET ORAL at 21:20

## 2017-01-01 RX ADMIN — SENNOSIDES AND DOCUSATE SODIUM 1 TABLET: 8.6; 5 TABLET ORAL at 08:03

## 2017-01-01 RX ADMIN — OXYCODONE HYDROCHLORIDE 10 MG: 5 TABLET ORAL at 22:00

## 2017-01-01 RX ADMIN — SENNOSIDES AND DOCUSATE SODIUM 1 TABLET: 8.6; 5 TABLET ORAL at 07:54

## 2017-01-01 RX ADMIN — DICLOFENAC SODIUM 2 G: 10 GEL TOPICAL at 08:03

## 2017-01-01 RX ADMIN — ATENOLOL 75 MG: 50 TABLET ORAL at 20:25

## 2017-01-01 RX ADMIN — CEFTRIAXONE SODIUM 1 G: 1 INJECTION, SOLUTION INTRAVENOUS at 21:53

## 2017-01-01 RX ADMIN — AMITRIPTYLINE HYDROCHLORIDE 50 MG: 50 TABLET, FILM COATED ORAL at 21:10

## 2017-01-01 RX ADMIN — OXYCODONE HYDROCHLORIDE 5 MG: 5 TABLET ORAL at 16:18

## 2017-01-01 RX ADMIN — DILTIAZEM HYDROCHLORIDE 10 MG: 5 INJECTION INTRAVENOUS at 22:24

## 2017-01-01 RX ADMIN — FLUTICASONE FUROATE AND VILANTEROL TRIFENATATE 1 PUFF: 100; 25 POWDER RESPIRATORY (INHALATION) at 08:22

## 2017-01-01 RX ADMIN — Medication 0.3 MG: at 13:57

## 2017-01-01 RX ADMIN — ATENOLOL 25 MG: 25 TABLET ORAL at 08:57

## 2017-01-01 RX ADMIN — ACETAMINOPHEN 1000 MG: 500 TABLET, FILM COATED ORAL at 20:12

## 2017-01-01 RX ADMIN — GABAPENTIN 600 MG: 300 CAPSULE ORAL at 21:37

## 2017-01-01 RX ADMIN — ACETAMINOPHEN 650 MG: 325 TABLET, FILM COATED ORAL at 05:53

## 2017-01-01 RX ADMIN — GABAPENTIN 600 MG: 600 TABLET, FILM COATED ORAL at 20:50

## 2017-01-01 RX ADMIN — SODIUM CHLORIDE 1000 ML: 9 INJECTION, SOLUTION INTRAVENOUS at 10:10

## 2017-01-01 RX ADMIN — OXYCODONE HYDROCHLORIDE 5 MG: 5 TABLET ORAL at 22:07

## 2017-01-01 RX ADMIN — LATANOPROST 1 DROP: 50 SOLUTION/ DROPS OPHTHALMIC at 20:55

## 2017-01-01 RX ADMIN — OXYCODONE HYDROCHLORIDE 5 MG: 5 TABLET ORAL at 01:51

## 2017-01-01 RX ADMIN — MORPHINE SULFATE 15 MG: 15 TABLET, EXTENDED RELEASE ORAL at 19:35

## 2017-01-01 RX ADMIN — MORPHINE SULFATE 15 MG: 15 TABLET, EXTENDED RELEASE ORAL at 07:54

## 2017-01-01 RX ADMIN — OMEPRAZOLE 20 MG: 20 CAPSULE, DELAYED RELEASE ORAL at 06:26

## 2017-01-01 RX ADMIN — ATENOLOL 25 MG: 25 TABLET ORAL at 08:59

## 2017-01-01 RX ADMIN — OXYCODONE HYDROCHLORIDE 5 MG: 5 TABLET ORAL at 16:36

## 2017-01-01 RX ADMIN — OMEPRAZOLE 20 MG: 20 CAPSULE, DELAYED RELEASE ORAL at 08:59

## 2017-01-01 RX ADMIN — DILTIAZEM HYDROCHLORIDE 30 MG: 30 TABLET, FILM COATED ORAL at 13:19

## 2017-01-01 RX ADMIN — LATANOPROST 1 DROP: 50 SOLUTION/ DROPS OPHTHALMIC at 21:33

## 2017-01-01 RX ADMIN — AMITRIPTYLINE HYDROCHLORIDE 50 MG: 50 TABLET, FILM COATED ORAL at 22:13

## 2017-01-01 RX ADMIN — ATENOLOL 50 MG: 50 TABLET ORAL at 07:55

## 2017-01-01 RX ADMIN — DICLOFENAC SODIUM 2 G: 10 GEL TOPICAL at 14:14

## 2017-01-01 RX ADMIN — METOPROLOL TARTRATE 25 MG: 25 TABLET, FILM COATED ORAL at 21:00

## 2017-01-01 RX ADMIN — SODIUM CHLORIDE 500 ML: 9 INJECTION, SOLUTION INTRAVENOUS at 22:18

## 2017-01-01 RX ADMIN — OXYCODONE HYDROCHLORIDE 5 MG: 5 TABLET ORAL at 05:55

## 2017-01-01 RX ADMIN — MULTIPLE VITAMINS W/ MINERALS TAB 1 TABLET: TAB at 08:48

## 2017-01-01 RX ADMIN — GABAPENTIN 600 MG: 300 CAPSULE ORAL at 21:16

## 2017-01-01 RX ADMIN — PREDNISONE 15 MG: 10 TABLET ORAL at 09:12

## 2017-01-01 RX ADMIN — DILTIAZEM HYDROCHLORIDE 60 MG: 30 TABLET, FILM COATED ORAL at 17:19

## 2017-01-01 ASSESSMENT — ACTIVITIES OF DAILY LIVING (ADL)
ADLS_ACUITY_SCORE: 15
ADLS_ACUITY_SCORE: 15
ADLS_ACUITY_SCORE: 17
ADLS_ACUITY_SCORE: 15
ADLS_ACUITY_SCORE: 17
ADLS_ACUITY_SCORE: 15
ADLS_ACUITY_SCORE: 17
ADLS_ACUITY_SCORE: 15
ADLS_ACUITY_SCORE: 15
ADLS_ACUITY_SCORE: 17
ADLS_ACUITY_SCORE: 15
ADLS_ACUITY_SCORE: 15
ADLS_ACUITY_SCORE: 17
ADLS_ACUITY_SCORE: 15
ADLS_ACUITY_SCORE: 17
ADLS_ACUITY_SCORE: 15
ADLS_ACUITY_SCORE: 15
ADLS_ACUITY_SCORE: 17
ADLS_ACUITY_SCORE: 15
ADLS_ACUITY_SCORE: 15
ADLS_ACUITY_SCORE: 17
ADLS_ACUITY_SCORE: 17
ADLS_ACUITY_SCORE: 14
ADLS_ACUITY_SCORE: 17
ADLS_ACUITY_SCORE: 17
ADLS_ACUITY_SCORE: 15
ADLS_ACUITY_SCORE: 15
ADLS_ACUITY_SCORE: 17
ADLS_ACUITY_SCORE: 15
ADLS_ACUITY_SCORE: 17
ADLS_ACUITY_SCORE: 17
ADLS_ACUITY_SCORE: 15
ADLS_ACUITY_SCORE: 15
ADLS_ACUITY_SCORE: 17
ADLS_ACUITY_SCORE: 17
ADLS_ACUITY_SCORE: 15
ADLS_ACUITY_SCORE: 17
ADLS_ACUITY_SCORE: 15
ADLS_ACUITY_SCORE: 17
ADLS_ACUITY_SCORE: 15
ADLS_ACUITY_SCORE: 17
ADLS_ACUITY_SCORE: 14
ADLS_ACUITY_SCORE: 15
ADLS_ACUITY_SCORE: 15
ADLS_ACUITY_SCORE: 17
ADLS_ACUITY_SCORE: 15
ADLS_ACUITY_SCORE: 17
ADLS_ACUITY_SCORE: 15
ADLS_ACUITY_SCORE: 17
ADLS_ACUITY_SCORE: 15
ADLS_ACUITY_SCORE: 17
ADLS_ACUITY_SCORE: 16
ADLS_ACUITY_SCORE: 15
ADLS_ACUITY_SCORE: 17
ADLS_ACUITY_SCORE: 15
ADLS_ACUITY_SCORE: 17

## 2017-01-01 ASSESSMENT — ENCOUNTER SYMPTOMS
FREQUENCY: 1
FEVER: 0
NUMBNESS: 0
NUMBNESS: 0
CHILLS: 0
WEAKNESS: 0
TREMORS: 0
COUGH: 1
FATIGUE: 1
LIGHT-HEADEDNESS: 0
ARTHRALGIAS: 1
NAUSEA: 0
ARTHRALGIAS: 1
BACK PAIN: 1
HEADACHES: 0
NAUSEA: 0
HEADACHES: 1
VOMITING: 0
WEAKNESS: 1
ABDOMINAL PAIN: 0
DYSURIA: 0
NUMBNESS: 0
BLOOD IN STOOL: 0
SHORTNESS OF BREATH: 0
VOMITING: 0

## 2017-01-01 ASSESSMENT — PAIN DESCRIPTION - DESCRIPTORS
DESCRIPTORS: CONSTANT;SORE
DESCRIPTORS: CONSTANT
DESCRIPTORS: SORE
DESCRIPTORS: ACHING

## 2017-02-10 PROBLEM — M25.552 LEFT HIP PAIN: Status: ACTIVE | Noted: 2017-01-01

## 2017-02-10 NOTE — PHARMACY-ADMISSION MEDICATION HISTORY
Admission medication history interview status for this patient is complete. See [facility] admission navigator for allergy information, prior to admission medications and immunization status.     Medication history interview source(s):Patient  Medication history resources (including written lists, pill bottles, clinic record):[facility] List  Primary pharmacy:-    Changes made to P[facility] medication list:  Added: all listed meds  Deleted: -  Changed: -    Actions taken by pharmacist (provider contacted, etc):None     Additional medication history information:None    Medication reconciliation/reorder completed by provider prior to medication history? No    For patients on insulin therapy: No   Lantus/levemir/NPH/Mix 70/30 dose:  _____   in AM/PM  or twice daily   Sliding scale Novolog Y/N  If Yes, do you have a baseline novolog pre-meal dose:  ______units with meals   Patients eat three meals a day:   Y/N     Any Barriers to therapy:  cost of medications/comfortable with giving injections (if applicable)/ comfortable and confident with current diabetes regimen       Prior to Admission medications    Medication Sig Last Dose Taking? Auth Provider   atenolol (TENORMIN) 25 MG tablet Take 25 mg by mouth 2 times daily 2/9/2017 at Unknown time Yes Unknown, Entered By History   fluticasone-vilanterol (BREO ELLIPTA) 100-25 MCG/INH oral inhaler Inhale 1 puff into the lungs daily 2/9/2017 at am Yes Unknown, Entered By History   Omega-3 Fatty Acids (FISH OIL) 1200 MG CAPS Take 1 capsule by mouth every morning 2/9/2017 at Unknown time Yes Unknown, Entered By History   gabapentin (NEURONTIN) 300 MG capsule Take 600 mg by mouth At Bedtime 2/9/2017 at Unknown time Yes Unknown, Entered By History   multivitamin, therapeutic (THERA-VIT) TABS tablet Take 1 tablet by mouth daily 2/9/2017 at am Yes Unknown, Entered By History   omeprazole (PRILOSEC) 20 MG CR capsule Take 20 mg by mouth daily 2/9/2017 at am Yes Unknown, Entered By History   cholecalciferol  (VITAMIN D) 1000 UNIT tablet Take 1,000 Units by mouth daily 2/9/2017 at am Yes Unknown, Entered By History   WARFARIN SODIUM PO 1.25 mg MWF and 2.5 mg ROW  Yes Unknown, Entered By History   albuterol (2.5 MG/3ML) 0.083% neb solution Take 1 vial by nebulization every 4 hours as needed for shortness of breath / dyspnea or wheezing  Yes Unknown, Entered By History   fluticasone (FLONASE) 50 MCG/ACT spray Spray 1 spray into both nostrils daily as needed for rhinitis or allergies  Yes Unknown, Entered By History   LORazepam (ATIVAN) 0.5 MG tablet Take 0.5 mg by mouth 2 times daily as needed for anxiety  Yes Unknown, Entered By History   naproxen (NAPROSYN) 500 MG tablet Take 500 mg by mouth 2 times daily as needed for moderate pain  Yes Unknown, Entered By History   albuterol (PROAIR HFA/PROVENTIL HFA/VENTOLIN HFA) 108 (90 BASE) MCG/ACT Inhaler Inhale 1 puff into the lungs every 4 hours as needed for shortness of breath / dyspnea or wheezing  Yes Unknown, Entered By History   senna-docusate (SENOKOT-S;PERICOLACE) 8.6-50 MG per tablet Take 1 tablet by mouth 2 times daily as needed for constipation  Yes Unknown, Entered By History   valACYclovir (VALTREX) 500 MG tablet Take 500 mg by mouth 2 times daily as needed For 3 days prn  Yes Unknown, Entered By History   cetirizine (ZYRTEC) 10 MG tablet Take 10 mg by mouth daily as needed for allergies  Yes Unknown, Entered By History

## 2017-02-10 NOTE — ED NOTES
Patient presents with worsening left hip pain.  Patient states that she has chronic left hip pain, however it has now increased in severity.   Patient states that she gets a hip shot in the joint in the past and it helps a great deal.  She is hoping that she can get another shot.  Patient rates her pain a 5 on a 1-10 scale.  ABCs intact.

## 2017-02-10 NOTE — IP AVS SNAPSHOT
Opal Gayleley #[id number] (CSN: [id number])  (84 year old F)  (Adm: 02/10/17)     [id number]               [facility] OBSERVATION DEPARTMENT: [phone]            Patient Demographics     Patient Name Sex          Age SSN Address Phone    Chuyita Gayle Female 1932 (84 year old) [id number] [address] [place] [address] [id number] [phone] (Home)  NONE (Work)  [phone] (Mobile) *Preferred*      Emergency Contact(s)     Name Relation Home Work Mobile    HECTOR CYR NONE NONE [phone]      Admission Information     Attending Provider Admitting Provider Admission Type Admission Date/Time    Yomaira Rhodes, Yomaira Zaman, DO Emergency 02/10/17  1212    Discharge Date Hospital Service Auth/Cert Status Service Area     Observation Incomplete [facility]    Unit Room/Bed Admission Status        OBSERVATION DEPT  Admission (Confirmed)       Admission     Complaint    Left hip pain      Hospital Account     Name Acct ID Class Status Primary Coverage    Opal Gayleley [id number] Observation Open MEDICARE - MEDICARE            Guarantor Account (for Hospital Account #[id number])     Name Relation to Pt Service Area Active? Acct Type    Chuyita Gayle Self FCS Yes Personal/Family    Address Phone          [address] [place] [phone](H)              Coverage Information (for Hospital Account #[id number])     1. MEDICARE/MEDICARE     F/O Payor/Plan Precert #    MEDICARE/MEDICARE     Subscriber Subscriber #    Chuyita Gayle [id number]    Address Phone    [employer]  [address]  [place] [address] [phone]          2. [place]     F/[place] Payor/Plan Precert [place]     Subscriber Subscriber #    Chuyita Gayle [id number]    Address Phone    [address] [address]  [place], [place] [id number] [phone]                                                       INTERAGENCY TRANSFER FORM - PHYSICIAN ORDERS   2/10/2017                       [facility] OBSERVATION DEPARTMENT: [phone]            Attending Provider: Yomaira Rhodes DO     Allergies:  Nabumetone, Sulfa Drugs, Vioxx [Rofecoxib], Celebrex [Celecoxib]    Infection:  None   Service:  Observation    Ht:  --   Wt:  70.3 kg (155 lb)   Admission Wt:  70.3 kg (155 lb)    BMI:  --   BSA:  --            ED Clinical Impression     Diagnosis Description Comment Added By Time Added    Hip pain, left [M25.552] Hip pain, left [M25.552]  Marisa Harris MD 2/10/2017  3:13 PM      Hospital Problems as of 2/13/2017     None      Non-Hospital Problems as of 2/13/2017     None      Code Status History     Date Active Date Inactive Code Status Order ID Comments User Context    2/13/2017 10:59 AM  Full Code [id number]  Marisa Clarke PA-C Outpatient    2/10/2017  5:09 PM 2/13/2017 10:59 AM Full Code [id number]  Taylor Torres PA-C ED      Current Code Status     Date Active Code Status Order ID Comments User Context       Prior      Summary of Visit     Reason for your hospital stay       You were admitted for concerns of left hip pain. Xrays did not show any evidence of fracture. Your pain is likely secondary to chronic arthritis. There is no pneumonia on your xray. You will get cough medication for supportive measures. Make a follow up appt with PCP in 1 week.                Medication Review      START taking        Dose / Directions Comments    acetaminophen 500 MG tablet   Commonly known as:  TYLENOL   Used for:  Hip pain, left        Dose:  1000 mg   Take 2 tablets (1,000 mg) by mouth 3 times daily   Quantity:  100 tablet   Refills:  0        cetirizine-psuedoePHEDrine 5-120 MG per 12 hr tablet   Commonly known as:  zyrTEC-D   Used for:  Cough        Dose:  1 tablet   Take 1 tablet by mouth every 12 hours as needed for allergies   Quantity:  28 tablet   Refills:   0        guaiFENesin-dextromethorphan 100-10 MG/5ML syrup   Commonly known as:  ROBITUSSIN DM   Used for:  Cough        Dose:  5-10 mL   Take 5-10 mLs by mouth every 4 hours as needed for cough   Quantity:  354 mL   Refills:  0          CONTINUE these medications which have NOT CHANGED        Dose / Directions Comments    * albuterol (2.5 MG/3ML) 0.083% neb solution        Dose:  1 vial   Take 1 vial by nebulization every 4 hours as needed for shortness of breath / dyspnea or wheezing   Refills:  0        * albuterol 108 (90 BASE) MCG/ACT Inhaler   Commonly known as:  PROAIR HFA/PROVENTIL HFA/VENTOLIN HFA        Dose:  1 puff   Inhale 1 puff into the lungs every 4 hours as needed for shortness of breath / dyspnea or wheezing   Refills:  0        atenolol 25 MG tablet   Commonly known as:  TENORMIN        Dose:  25 mg   Take 25 mg by mouth 2 times daily   Refills:  0        ATIVAN 0.5 MG tablet   Generic drug:  LORazepam        Dose:  0.5 mg   Take 0.5 mg by mouth 2 times daily as needed for anxiety   Refills:  0        BREO ELLIPTA 100-25 MCG/INH oral inhaler   Generic drug:  fluticasone-vilanterol        Dose:  1 puff   Inhale 1 puff into the lungs daily   Refills:  0        cholecalciferol 1000 UNIT tablet   Commonly known as:  vitamin D        Dose:  1000 Units   Take 1,000 Units by mouth daily   Refills:  0        Fish Oil 1200 MG Caps        Dose:  1 capsule   Take 1 capsule by mouth every morning   Refills:  0        fluticasone 50 MCG/ACT spray   Commonly known as:  FLONASE        Dose:  1 spray   Spray 1 spray into both nostrils daily as needed for rhinitis or allergies   Refills:  0        gabapentin 300 MG capsule   Commonly known as:  NEURONTIN        Dose:  600 mg   Take 600 mg by mouth At Bedtime   Refills:  0        multivitamin, therapeutic Tabs tablet        Dose:  1 tablet   Take 1 tablet by mouth daily   Refills:  0        priLOSEC 20 MG CR capsule   Generic drug:  omeprazole        Dose:  20 mg    Take 20 mg by mouth daily   Refills:  0        senna-docusate 8.6-50 MG per tablet   Commonly known as:  SENOKOT-S;PERICOLACE        Dose:  1 tablet   Take 1 tablet by mouth 2 times daily as needed for constipation   Refills:  0        VALTREX 500 MG tablet   Generic drug:  valACYclovir        Dose:  500 mg   Take 500 mg by mouth 2 times daily as needed For 3 days prn   Refills:  0        WARFARIN SODIUM PO        1.25 mg MWF and 2.5 mg ROW   Refills:  0        * Notice:  This list has 2 medication(s) that are the same as other medications prescribed for you. Read the directions carefully, and ask your doctor or other care provider to review them with you.      STOP taking     cetirizine 10 MG tablet   Commonly known as:  zyrTEC           naproxen 500 MG tablet   Commonly known as:  NAPROSYN                   After Care     Activity       Your activity upon discharge: activity as tolerated       Diet       Follow this diet upon discharge: Orders Placed This Encounter      Regular Diet Adult             Referrals     Home Care OT Referral for Hospital Discharge       OT to eval and treat    Your provider has ordered home care - occupational therapy. If you have not been contacted within 2 days of your discharge please call the department phone number listed on the top of this document.       Home Care PT Referral for Hospital Discharge       PT to eval and treat    Your provider has ordered home care - physical therapy. If you have not been contacted within 2 days of your discharge please call the department phone number listed on the top of this document.       Home care nursing referral       RN extended hours visit. RN to assess vital signs and weight, pain level and activity tolerance, hydration, nutrition and bowel status and home safety.    Your provider has ordered home care nursing services. If you have not been contacted within 2 days of your discharge please call the inpatient department phone number at  [phone] .             Follow-Up Appointment Instructions     Follow-up and recommended labs and tests        Follow up with primary care provider, Kenneth G. Pallas, within 7 days for hospital follow- up.  No follow up labs or test are needed.              MD face to face encounter       Documentation of Face to Face and Certification for Home Health Services    I certify that patient: Chuyita Gayle is under my care and that I, or a nurse practitioner or physician's assistant working with me, had a face-to-face encounter that meets the physician face-to-face encounter requirements with this patient on: 2/13/2017.    This encounter with the patient was in whole, or in part, for the following medical condition, which is the primary reason for home health care: dec mobility, home safety concerns.    I certify that, based on my findings, the following services are medically necessary home health services: Nursing, Occupational Therapy and Physical Therapy.    My clinical findings support the need for the above services because: Nurse is needed: To assess pain control after changes in medications or other medical regimen.., Occupational Therapy Services are needed to assess and treat cognitive ability and address ADL safety due to impairment in gait and stability. and Physical Therapy Services are needed to assess and treat the following functional impairments: dec mobility, strength and assess adls.    Further, I certify that my clinical findings support that this patient is homebound (i.e. absences from home require considerable and taxing effort and are for medical reasons or [employer] services or infrequently or of short duration when for other reasons) because: Requires assistance of another person or specialized equipment to access medical services because patient: Is prone to wander/get lost without assistance. and Requires supervision of another for safe transfer...    Based on the above findings. I  certify that this patient is confined to the home and needs intermittent skilled nursing care, physical therapy and/or speech therapy.  The patient is under my care, and I have initiated the establishment of the plan of care.  This patient will be followed by a physician who will periodically review the plan of care.  Physician/Provider to provide follow up care: Pallas, Kenneth G    Attending hospital physician (the Medicare certified PECOS provider): Yomaira Rhodes*  Physician Signature: See electronic signature associated with these discharge orders.  Date: 2/13/2017                  Statement of Approval     Ordered          02/13/17 1059  I have reviewed and agree with all the recommendations and orders detailed in this document.  EFFECTIVE NOW     Approved and electronically signed by:  Yomaira Rhodes DO                                                 INTERAGENCY TRANSFER FORM - NURSING   2/10/2017                       [facility] OBSERVATION DEPARTMENT: [phone]            Attending Provider: Yomaira Rhodes DO     Allergies:  Nabumetone, Sulfa Drugs, Vioxx [Rofecoxib], Celebrex [Celecoxib]    Infection:  None   Service:  Observation    Ht:  --   Wt:  70.3 kg (155 lb)   Admission Wt:  70.3 kg (155 lb)    BMI:  --   BSA:  --            Advance Directives        Does patient have a scanned Advance Directive/ACP document in EPIC?           No        Immunizations     None      ASSESSMENT     Discharge Profile Flowsheet     EXPECTED DISCHARGE     GASTROINTESTINAL (ADULT,PEDIATRIC,OB)      Expected Discharge Date  02/12/17 (DRG 1<single.[facility] AV) 02/12/17 1658   GI WDL  WDL 02/11/17 0037    DISCHARGE NEEDS ASSESSMENT     All Quadrants Bowel Sounds  audible and active in all quadrants 02/11/17 0037    Patient/family verbalizes understanding of discharge plan recommendations?  Yes 02/11/17 1513   COMMUNICATION ASSESSMENT      Medical Team notified of plan?   "yes 02/11/17 1521   Patient's communication style  spoken language (English or Bilingual) 02/10/17 1213    Readmission Within The Last 30 Days  no previous admission in last 30 days 02/11/17 1521   SAFETY      Anticipated Changes Related to Illness  none 02/11/17 1521   Safety WDL  WDL;safety factors 02/13/17 1056    Equipment Currently Used at Home  walker, rolling 02/11/17 1521   Safety Factors  ID band on;upper side rails raised x 2;call light in reach;wheels locked;bed in low position 02/13/17 1056    Transportation Available  family or friend will provide 02/11/17 1521                      Assessment WDL (Within Defined Limits) Definitions           Safety WDL     Effective: 09/28/15    Row Information: <b>WDL Definition:</b> Bed in low position, wheels locked; call light in reach; upper side rails up x 2; ID band on<br> <font color=\"gray\"><i>Item=AS safety wdl>>List=AS safety wdl>>Version=F14</i></font>      Vitals     Vital Signs Flowsheet     VITAL SIGNS     Patient's Stated Pain Goal  No pain 02/10/17 1548    Temp  96  F (35.6  C) 02/13/17 0822   0-10 Pain Scale  0 02/12/17 2346    Temp src  Oral 02/13/17 0822   HEIGHT AND WEIGHT      Resp  20 02/13/17 0822   Weight  70.3 kg (155 lb) 02/10/17 1216    Pulse  82 02/12/17 0809   POSITIONING      Heart Rate  71 02/13/17 0822   Body Position  supine, head elevated 02/13/17 0824    Pulse/Heart Rate Source  Monitor 02/13/17 0822   Head of Bed (HOB)  HOB at 30 degrees 02/10/17 1541    BP  175/77 02/13/17 0822   Positioning/Transfer Devices  other (see comments) (omar steady ) 02/13/17 1013    OXYGEN THERAPY     Chair  Recline and up in chair 02/13/17 1056    SpO2  92 % 02/13/17 0822   DAILY CARE      O2 Device  None (Room air) 02/13/17 0822   Activity Type  ambulated in room 02/13/17 1056    Oxygen Delivery  2 LPM 02/11/17 1140   Activity Level of Assistance  assistance, 1 person 02/13/17 1056    PAIN/COMFORT     Activity Assistive Device  walker;gait belt 02/13/17 " 1056    Patient Currently in Pain  REESE 02/13/17 0532   Additional Documentation  Activity Device Assistance (Row) 02/13/17 1056            Patient Lines/Drains/Airways Status    Active LINES/DRAINS/AIRWAYS     Name: Placement date: Placement time: Site: Days: Last dressing change:    Peripheral IV 02/10/17 Right Hand 02/10/17   1449   Hand   2             Patient Lines/Drains/Airways Status    Active PICC/CVC     None            Intake/Output Detail Report     None      Last Void/BM       Most Recent Value    Urine Occurrence 1 at 02/13/2017 1011    Stool Occurrence       Case Management/Discharge Planning     Case Management/Discharge Planning Flowsheet     REFERRAL INFORMATION     Sources Of Support  other (see comments) (Grandchildren) 02/11/17 1507    Did the Initial Social Work Assessment result in a Social Work Case?  Yes 02/11/17 1501   Reaction To Health Status  accepting 02/11/17 1521    Admission Type  observation 02/11/17 1501   Understanding Of Condition And Treatment  adequate understanding of medical condition;adequate understanding of treatment 02/11/17 1521    Arrived From  nursing facility 02/11/17 1501   COPING/STRESS CAREGIVER      Referral Source  physician 02/11/17 1501   Major Change/Loss/Stressor  denies 02/11/17 1521    Reason For Consult  discharge planning 02/11/17 1501   Primary Caregiver Strengths  able to adapt 02/11/17 1521    Record Reviewed  clinical discipline documentation;patient profile;plan of care 02/11/17 1501   Sources Of Support  sibling(s) 02/11/17 1521     Assigned to Case  Mohini Salinas 02/11/17 1501   Reaction To Health Status  accepting 02/11/17 1521    LIVING ENVIRONMENT     Understanding Of Condition And Treatment  adequate understanding of medical condition;adequate understanding of treatment 02/11/17 1521    Lives With  alone 02/11/17 1503   EXPECTED DISCHARGE      Living Arrangements  apartment 02/11/17 1503   Expected Discharge Date  02/12/17 (DRG  1<single.[facility]) 02/12/17 1658    Provides Primary Care For  no one 02/11/17 1503   DISCHARGE PLANNING      Primary Care Provided By  other (see comments) (Granddaug) 02/11/17 1503   Patient/family verbalizes understanding of discharge plan recommendations?  Yes 02/11/17 1513    Quality Of Family Relationships  supportive;involved 02/11/17 1503   Medical Team notified of plan?  yes 02/11/17 1521    HOME SAFETY     Readmission Within The Last 30 Days  no previous admission in last 30 days 02/11/17 1521    Patient Feels Safe Living in Home?  yes 02/11/17 1503   Anticipated Changes Related to Illness  none 02/11/17 1521    ASSESSMENT OF FAMILY/SOCIAL SUPPORT     Transportation Available  family or friend will provide 02/11/17 1521    Marital Status   02/11/17 1504   FINAL RESOURCES      Who is your support system?  Other (specify) (Grandchildren) 02/11/17 1504   Equipment Currently Used at Home  walker, rolling 02/11/17 1521    Description of Support System  Supportive;Involved 02/11/17 1504   ABUSE RISK SCREEN      Support Assessment  Adequate family and caregiver support;Adequate social supports 02/11/17 1504   QUESTION TO PATIENT:  Has a member of your family or a partner(now or in the past) intimidated, hurt, manipulated, or controlled you in any way?  no 02/10/17 1630    Quality of Family Relationships  supportive;involved 02/11/17 1504   QUESTION TO PATIENT: Do you feel safe going back to the place where you are living?  yes 02/10/17 1630    EMPLOYMENT     OBSERVATION: Is there reason to believe there has been maltreatment of a vulnerable adult (ie. Physical/Sexual/Emotional abuse, self neglect, lack of adequate food, shelter, medical care, or financial exploitation)?  no 02/10/17 1630    Do you work full or part-time?  no 02/11/17 1507   (R) MENTAL HEALTH SUICIDE RISK      Will you be able to return to your job?  no 02/11/17 1507   Are you depressed or being treated for depression?  No 02/10/17  1630    COPING/STRESS     HOMICIDE RISK      Patient Personal Strengths  positive attitude 02/11/17 1507   Homicidal Ideation  no 02/10/17 1220                  [facility] OBSERVATION DEPARTMENT: [phone]            Medication Administration Report for Chuyita Gayle as of 02/13/17 1141   Legend:    Given Hold Not Given Due Canceled Entry Other Actions    Time Time (Time) Time  Time-Action       Inactive    Active    Linked        Medications 02/07/17 02/08/17 02/09/17 02/10/17 02/11/17 02/12/17 02/13/17    acetaminophen (TYLENOL) tablet 1,000 mg  Dose: 1,000 mg Freq: EVERY 6 HOURS SCHEDULED Route: PO  Start: 02/10/17 1800   Admin Instructions: Alternate ibuprofen (if ordered) with acetaminophen.  Maximum acetaminophen dose from all sources = 75 mg/kg/day not to exceed 4 gram        2012 (1,000 mg)-Given        0007 (1,000 mg)-Given       0616 (1,000 mg)-Given       1338 (1,000 mg)-Given       2048 (1,000 mg)-Given        (0200)-Not Given [C]       0936 (1,000 mg)-Given       1724 (1,000 mg)-Given       2346 (1,000 mg)-Given        0603 (1,000 mg)-Given       [ ] 1200       [ ] 1800           albuterol (PROAIR HFA/PROVENTIL HFA/VENTOLIN HFA) Inhaler 1 puff  Dose: 1 puff Freq: EVERY 4 HOURS PRN Route: IN  PRN Reasons: shortness of breath / dyspnea,wheezing  Start: 02/10/17 2050   Admin Instructions: Patient may use own supply        2136 (1 puff)-Given              atenolol (TENORMIN) tablet 25 mg  Dose: 25 mg Freq: 2 TIMES DAILY Route: PO  Start: 02/10/17 2000   Admin Instructions: Hold if SBP <120        2012 (25 mg)-Given        0859 (25 mg)-Given       2048 (25 mg)-Given        0936 (25 mg)-Given       2115 (25 mg)-Given        0857 (25 mg)-Given       [ ] 2000           benzonatate (TESSALON) capsule 100 mg  Dose: 100 mg Freq: 3 TIMES DAILY PRN Route: PO  PRN Reason: cough  Start: 02/12/17 2032              cetirizine (zyrTEC) tablet 10 mg  Dose: 10 mg Freq: DAILY PRN Route: PO  PRN Reason:  allergies  Start: 02/10/17 1702              cyclobenzaprine (FLEXERIL) tablet 10 mg  Dose: 10 mg Freq: 3 TIMES DAILY Route: PO  Start: 02/10/17 2000       2012 (10 mg)-Given        0859 (10 mg)-Given       (1338)-Not Given       (2048)-Not Given        (0933)-Not Given       (1724)-Not Given       (2105)-Not Given        0859-Hold       [ ] 1400       [ ] 2000           fluticasone-vilanterol (BREO ELLIPTA) 100-25 MCG/INH oral inhaler 1 puff  Dose: 1 puff Freq: DAILY Route: IN  Start: 02/10/17 1710   Admin Instructions: *Do not use more frequently than once daily.*  Rinse mouth after use.  Patient can use home inhaler.        (2011)-Not Given [C]        (0900)-Not Given                (1019)-Not Given [C]           gabapentin (NEURONTIN) capsule 600 mg  Dose: 600 mg Freq: AT BEDTIME Route: PO  Start: 02/10/17 2200       2137 (600 mg)-Given        2048 (600 mg)-Given               2116 (600 mg)-Given        [ ] 2200           guaiFENesin-dextromethorphan (ROBITUSSIN DM) 100-10 MG/5ML syrup 5 mL  Dose: 5 mL Freq: EVERY 4 HOURS PRN Route: PO  PRN Reason: cough  Start: 02/12/17 2032          1032 (5 mL)-Given           hydrOXYzine (ATARAX) tablet 10 mg  Dose: 10 mg Freq: 3 TIMES DAILY PRN Route: PO  PRN Comment: pain adjunct  Start: 02/10/17 1708              LORazepam (ATIVAN) tablet 0.5 mg  Dose: 0.5 mg Freq: 2 TIMES DAILY PRN Route: PO  PRN Reason: anxiety  Start: 02/10/17 1703              naloxone (NARCAN) injection 0.1-0.4 mg  Dose: 0.1-0.4 mg Freq: EVERY 2 MIN PRN Route: IV  PRN Reason: opioid reversal  Start: 02/10/17 1703   Admin Instructions: For respiratory rate LESS than or EQUAL to 8.  Partial reversal dose:  0.1 mg titrated q 2 minutes for Analgesia Side Effects Monitoring Sedation Level of 3 (frequently drowsy, arousable, drifts to sleep during conversation).Full reversal dose:  0.4 mg bolus for Analgesia Side Effects Monitoring Sedation Level of 4 (somnolent, minimal or no response to stimulation).                naproxen (NAPROSYN) tablet 500 mg  Dose: 500 mg Freq: 2 TIMES DAILY PRN Route: PO  PRN Reason: moderate pain  Start: 02/10/17 1703         0011 (500 mg)-Given            omeprazole (priLOSEC) CR capsule 20 mg  Dose: 20 mg Freq: DAILY Route: PO  Start: 02/10/17 1710       2013 (20 mg)-Given        0859 (20 mg)-Given        0936 (20 mg)-Given        0857 (20 mg)-Given           ondansetron (ZOFRAN-ODT) ODT tab 4 mg  Dose: 4 mg Freq: EVERY 6 HOURS PRN Route: PO  PRN Reason: nausea  Start: 02/10/17 1708   Admin Instructions: This is Step 1 of nausea and vomiting management.  If nausea not resolved in 15 minutes, go to Step 2 prochlorperazine (COMPAZINE). Do not push through foil backing. Peel back foil and gently remove. Place on tongue immediately. Administration with liquid unnecessary              Or  ondansetron (ZOFRAN) injection 4 mg  Dose: 4 mg Freq: EVERY 6 HOURS PRN Route: IV  PRN Reasons: nausea,vomiting  Start: 02/10/17 1708   Admin Instructions: This is Step 1 of nausea and vomiting management.  If nausea not resolved in 15 minutes, go to Step 2 prochlorperazine (COMPAZINE).               prochlorperazine (COMPAZINE) injection 5 mg  Dose: 5 mg Freq: EVERY 6 HOURS PRN Route: IV  PRN Reasons: nausea,vomiting  Start: 02/10/17 1708   Admin Instructions: This is Step 2 of nausea and vomiting management.   If nausea not resolved in 15 minutes, give metoclopramide (REGLAN) if ordered (step 3 of nausea and vomiting management)              Or  prochlorperazine (COMPAZINE) tablet 5 mg  Dose: 5 mg Freq: EVERY 6 HOURS PRN Route: PO  PRN Reason: vomiting  Start: 02/10/17 1708   Admin Instructions: This is Step 2 of nausea and vomiting management.   If nausea not resolved in 15 minutes, give metoclopramide (REGLAN) if ordered (step 3 of nausea and vomiting management)              Or  prochlorperazine (COMPAZINE) Suppository 12.5 mg  Dose: 12.5 mg Freq: EVERY 12 HOURS PRN Route: RE  PRN Reasons:  nausea,vomiting  Start: 02/10/17 1708   Admin Instructions: This is Step 2 of nausea and vomiting management.   If nausea not resolved in 15 minutes, give metoclopramide (REGLAN) if ordered (step 3 of nausea and vomiting management)               senna-docusate (SENOKOT-S;PERICOLACE) 8.6-50 MG per tablet 1-2 tablet  Dose: 1-2 tablet Freq: 2 TIMES DAILY PRN Route: PO  PRN Comment: constipation   Start: 02/10/17 1708   Admin Instructions: If no bowel movement in 24 hours, increase to 2 tablets PO BID.  Hold for loose stools.   This is the first step of a three step constipation treatment protocol.           0857 (1 tablet)-Given           traMADol (ULTRAM) half-tab 25 mg  Dose: 25 mg Freq: EVERY 6 HOURS PRN Route: PO  PRN Reason: moderate to severe pain  Start: 02/11/17 1047              Warfarin Therapy Reminder (Check START DATE - warfarin may be starting in the FUTURE)  Freq: CONTINUOUS PRN Route: XX  Start: 02/10/17 1709   Admin Instructions: *Note to reorder warfarin daily*  Pharmacy Warfarin Dosing Service  Patient is on Warfarin Therapy - check for daily order              Future Medications  Medications 02/07/17 02/08/17 02/09/17 02/10/17 02/11/17 02/12/17 02/13/17       warfarin (COUMADIN) half-tab 1.25 mg  Dose: 1.25 mg Freq: ONCE AT 6PM Route: PO  Start: 02/13/17 1800          [ ] 1800          Completed Medications  Medications 02/07/17 02/08/17 02/09/17 02/10/17 02/11/17 02/12/17 02/13/17         Dose: 650 mg Freq: ONCE Route: PO  Start: 02/10/17 1233   End: 02/10/17 1247   Admin Instructions: Maximum acetaminophen dose from all sources = 75 mg/kg/day not to exceed 4 grams/day.        1247 (650 mg)-Given                Dose: 25 mcg Freq: ONCE Route: IV  Start: 02/10/17 1425   End: 02/10/17 1449       1449 (25 mcg)-Given [C]                Dose: 2.5 mg Freq: ONCE Route: PO  Start: 02/10/17 1446   End: 02/10/17 1536       1536 (2.5 mg)-Given                Dose: 1.25 mg Freq: ONCE AT 6PM Route: PO  Start:  02/12/17 1800   End: 02/12/17 1726         1726 (1.25 mg)-Given              Dose: 2.5 mg Freq: ONCE AT 6PM Route: PO  Start: 02/11/17 1800   End: 02/11/17 1814        1814 (2.5 mg)-Given               Dose: 2.5 mg Freq: ONCE Route: PO  Start: 02/10/17 1921   End: 02/10/17 2137       2137 (2.5 mg)-Given             Discontinued Medications  Medications 02/07/17 02/08/17 02/09/17 02/10/17 02/11/17 02/12/17 02/13/17         Dose: 1 puff Freq: EVERY 4 HOURS PRN Route: IN  PRN Reasons: shortness of breath / dyspnea,wheezing  Start: 02/10/17 2017   End: 02/10/17 2053       2053-Med Discontinued            Dose: 0.2 mg Freq: EVERY 2 HOURS PRN Route: IV  PRN Reason: severe pain  PRN Comment: breakthrough pain  Start: 02/10/17 1707   End: 02/11/17 1531   Admin Instructions: If needing IV pain meds for 2 or more doses call provider to have oral medications adjusted to get pain controlled on oral regimen prior to discharge.         1531-Med Discontinued           Dose: 2.5 mg Freq: EVERY 4 HOURS PRN Route: PO  PRN Reason: moderate to severe pain  Start: 02/11/17 1049   End: 02/11/17 1140   Admin Instructions: If age greater than 65 use lower dose for first time use.         1140-Med Discontinued           Dose: 5 mg Freq: EVERY 3 HOURS PRN Route: PO  PRN Reason: moderate to severe pain  Start: 02/10/17 1707   End: 02/11/17 1048   Admin Instructions: If age greater than 65 use lower dose for first time use.         0055 (5 mg)-Given       1048-Med Discontinued                  INTERAGENCY TRANSFER FORM - NOTES (H&P, Discharge Summary, Consults, Procedures, Therapies)   2/10/2017                       [facility] OBSERVATION DEPARTMENT: [phone]               History & Physicals      H&P by Taylor Torres PA-C at 2/10/2017  5:33 PM     Author:  Taylor Torres PA-C Service:  Hospitalist Author Type:  Physician Assistant - C    Filed:  2/10/2017 10:50 PM Date of Service:  2/10/2017  5:33 PM Note  Created:  2/10/2017  7:33 PM    Status:  Cosign Needed :  Taylor Torres PA-C (Physician Assistant - C)    Cosign Required:  Yes             [facility]    Observation Unit  Hospitalist History and Physical    Name: Chuyita Gayle    MRN: [id number]  YOB: 1932    Age: 84 year old  Date of Admission:  2/10/2017  Date of Service (when I saw the patient): 02/10/2017    Assessment and Plan  Chuyita Gayle is a 84 year old female with PMH significant for osteoarthritis, A-fib, HTN, GERD, asthma, and fibromyalgia who present with uncontrolled left hip pain and weakness.      1. Left hip pain: no inciting fall or trauma to cause the patient's acute worsening of her left hip pain. Patient has known osteoarthritis of left hip which is likely the cause of her hip pain and increased weakness. X-ray of hip in the ED shows mild degenerative changes. The patient has had previous steroid injections in her hip with relief of her pain, unfortunately injections are not performed here on the weekend and thus the plan will be for pain control, mobilization with PT, and schedule outpatient steroid injection next week.   - Pain control with scheduled Tylenol, PRN Naproxen, Flexeril, Atarax, and Oxycodone with IV Dilaudid available for severe pain  - PRN ice to left hip  - PT consulted for evaluation   - SW consulted since patient resides at the [facility] in an independent apartment    2. A-fib: INR mildly subtherapeutic at 1.95, pharmacy to dose Warfarin  3. HTN: elevated blood pressures, suspect secondary to pain, continue Atenolol   4. Asthma: no recent exacerbations, continue Breo inhaler and PRN Albuterol inhaler   5. GERD: continue omeprazole   6. Anxiety: PTA PRN ativan available     DVT Prophylaxis: Ambulate every shift  Code Status: Full Code    Disposition: Expected discharge within 24-48 hours     Primary Care Physician  Kenneth G. Pallas    Chief Complaint  Left hip pain     History is  obtained from the patient, chart review, and ED provider.   I also spoke with the ER provider about the history. Dr. Harris reports that the patient has known hip arthritis and has previous steroid injections with improvement in her pain and she presents today due to acute worsening of her left hip pain last night and decreased mobility from this. X-ray of hip in the ED shows previously known arthritic changes. No recent falls or trauma to cause acute increased pain. Asked to admit the patient for pain control and PT consult for mobility.      History of Present Illness  Chuyita Gayle is a 84 year old female who presents with left hip pain. Patient states that she woke up in the middle of the night due to worsening left hip pain and that over the last few days she has been having a difficulty time ambulating due to increased weakness even with assistance from her walker. She notes that the pain is sharp, does not radiate, 8/10 but has improved to to 2-3/10 after pain medication, and denies numbness/tingling in her left leg. No recent trauma or fall. Her last steroid injection in her hip was about a year ago. Notes mild shortness of breath intermittently which is her baseline due to her asthma. Denies fever, N/V, chest pain, abdominal pain, urinary symptoms, or change in bowel movements. Denies any recent illness.     Past Medical History   Past Medical History   Diagnosis Date     A-fib (H)      Fibromyalgia      Uncomplicated asthma      Anxiety      Past Surgical History  Past Surgical History   Procedure Laterality Date     Gi surgery       Cholecystectomy       Orthopedic surgery       Back surgery       Prior to Admission Medications  Prior to Admission Medications   Prescriptions Last Dose Informant Patient Reported? Taking?   LORazepam (ATIVAN) 0.5 MG tablet   Yes Yes   Sig: Take 0.5 mg by mouth 2 times daily as needed for anxiety   Omega-3 Fatty Acids (FISH OIL) 1200 MG CAPS 2/9/2017 at Unknown time  Yes  Yes   Sig: Take 1 capsule by mouth every morning   WARFARIN SODIUM PO   Yes Yes   Si.25 mg MWF and 2.5 mg ROW   albuterol (2.5 MG/3ML) 0.083% neb solution   Yes Yes   Sig: Take 1 vial by nebulization every 4 hours as needed for shortness of breath / dyspnea or wheezing   albuterol (PROAIR HFA/PROVENTIL HFA/VENTOLIN HFA) 108 (90 BASE) MCG/ACT Inhaler   Yes Yes   Sig: Inhale 1 puff into the lungs every 4 hours as needed for shortness of breath / dyspnea or wheezing   atenolol (TENORMIN) 25 MG tablet 2017 at Unknown time  Yes Yes   Sig: Take 25 mg by mouth 2 times daily   cetirizine (ZYRTEC) 10 MG tablet   Yes Yes   Sig: Take 10 mg by mouth daily as needed for allergies   cholecalciferol (VITAMIN D) 1000 UNIT tablet 2017 at am  Yes Yes   Sig: Take 1,000 Units by mouth daily   fluticasone (FLONASE) 50 MCG/ACT spray   Yes Yes   Sig: Spray 1 spray into both nostrils daily as needed for rhinitis or allergies   fluticasone-vilanterol (BREO ELLIPTA) 100-25 MCG/INH oral inhaler 2017 at am  Yes Yes   Sig: Inhale 1 puff into the lungs daily   gabapentin (NEURONTIN) 300 MG capsule 2017 at Unknown time  Yes Yes   Sig: Take 600 mg by mouth At Bedtime   multivitamin, therapeutic (THERA-VIT) TABS tablet 2017 at am  Yes Yes   Sig: Take 1 tablet by mouth daily   naproxen (NAPROSYN) 500 MG tablet   Yes Yes   Sig: Take 500 mg by mouth 2 times daily as needed for moderate pain   omeprazole (PRILOSEC) 20 MG CR capsule 2017 at am  Yes Yes   Sig: Take 20 mg by mouth daily   senna-docusate (SENOKOT-S;PERICOLACE) 8.6-50 MG per tablet   Yes Yes   Sig: Take 1 tablet by mouth 2 times daily as needed for constipation   valACYclovir (VALTREX) 500 MG tablet   Yes Yes   Sig: Take 500 mg by mouth 2 times daily as needed For 3 days prn      Facility-Administered Medications: None     Allergies  Allergies   Allergen Reactions     Nabumetone Rash     Sulfa Drugs Rash     Vioxx [Rofecoxib] Rash     Celebrex [Celecoxib] Rash      Social History  Social History   Substance Use Topics     Smoking status: Not on file     Smokeless tobacco: Not on file     Alcohol Use: Not on file     Social History     Social History Narrative     No narrative on file     Family History  Family history reviewed with patient and is noncontributory.    Review of Systems  A Comprehensive greater than 10 system review of systems was carried out.  Pertinent positives and negatives are noted above.  Otherwise negative for contributory information.    Physical Exam  Temp: 96.5  F (35.8  C) Temp src: Axillary BP: (!) 184/93 mmHg Pulse: 82 Heart Rate: 91 Resp: 16 SpO2: 98 % O2 Device: None (Room air)    Vital Signs with Ranges  Temp:  [96.5  F (35.8  C)-97.6  F (36.4  C)] 96.5  F (35.8  C)  Pulse:  [82-90] 82  Heart Rate:  [82-91] 91  Resp:  [16-20] 16  BP: (153-191)/() 184/93 mmHg  SpO2:  [94 %-98 %] 98 %  155 lbs 0 oz    GEN:  Alert, oriented x 3, appears comfortable, no overt distress  HEENT:  Normocephalic/atraumatic, no scleral icterus, no nasal discharge, mouth moist.  CV:  Regular rate and rhythm, no murmur or JVD.  S1 + S2 noted, no S3 or S4.  LUNGS:  Clear to auscultation bilaterally without rales/rhonchi/wheezing/retractions.  Symmetric chest rise on inhalation noted.  ABD:  Active bowel sounds, soft, non-tender/non-distended.  No rebound/guarding/rigidity.  EXT:  Tenderness to palpation over left SI joint. 1-2+ edema in bilateral LE.  No cyanosis.  No acute joint synovitis noted.  SKIN:  Dry to touch, no exanthems noted in the visualized areas.  NEURO:  Symmetric muscle strength, sensation to touch grossly intact.  Coordination symmetric on general exam.  No new focal deficits appreciated.    Data  Data reviewed today:  I personally reviewed all labs and imaging performed during this visit.     Results for orders placed or performed during the hospital encounter of 02/10/17   XR Pelvis and Hip Left 2 Views    Narrative    XR PELVIS AND HIP LEFT 2  VIEWS 2/10/2017 1:44 PM    HISTORY: Hip pain. Worse overnight.    COMPARISON: None.    FINDINGS: No fracture or malalignment. Mild symmetric osteoarthritis  at the hips characterized by some loss of joint space and small  marginal osteophytes. Sacroiliac joints are patent and symmetric.      Impression    IMPRESSION: Mild degenerative change. No acute abnormality.    TARYN ZAPIEN MD   CBC with platelets differential   Result Value Ref Range    WBC 6.3 4.0 - 11.0 10e9/L    RBC Count 4.13 3.8 - 5.2 10e12/L    Hemoglobin 12.9 11.7 - 15.7 g/dL    Hematocrit 38.8 35.0 - 47.0 %    MCV 94 78 - 100 fl    MCH 31.2 26.5 - 33.0 pg    MCHC 33.2 31.5 - 36.5 g/dL    RDW 13.0 10.0 - 15.0 %    Platelet Count 287 150 - 450 10e9/L    Diff Method Automated Method     % Neutrophils 38.9 %    % Lymphocytes 46.9 %    % Monocytes 9.9 %    % Eosinophils 3.7 %    % Basophils 0.3 %    % Immature Granulocytes 0.3 %    Nucleated RBCs 0 0 /100    Absolute Neutrophil 2.4 1.6 - 8.3 10e9/L    Absolute Lymphocytes 2.9 0.8 - 5.3 10e9/L    Absolute Monocytes 0.6 0.0 - 1.3 10e9/L    Absolute Eosinophils 0.2 0.0 - 0.7 10e9/L    Absolute Basophils 0.0 0.0 - 0.2 10e9/L    Abs Immature Granulocytes 0.0 0 - 0.4 10e9/L    Absolute Nucleated RBC 0.0    Basic metabolic panel   Result Value Ref Range    Sodium 139 133 - 144 mmol/L    Potassium 4.2 3.4 - 5.3 mmol/L    Chloride 104 94 - 109 mmol/L    Carbon Dioxide 26 20 - 32 mmol/L    Anion Gap 9 3 - 14 mmol/L    Glucose 106 (H) 70 - 99 mg/dL    Urea Nitrogen 13 7 - 30 mg/dL    Creatinine 0.86 0.52 - 1.04 mg/dL    GFR Estimate 63 >60 mL/min/1.7m2    GFR Estimate If Black 76 >60 mL/min/1.7m2    Calcium 9.1 8.5 - 10.1 mg/dL   INR   Result Value Ref Range    INR 1.95 (H) 0.86 - 1.14       Taylor L Bisek PA-C       Revision History        User Key Date/Time User Provider Type Action    > [N/A] 2/10/2017 10:50 PM Taylor Torres PA-C Physician Assistant - HERMINIO Sign                     Discharge Summaries       Discharge Summaries by Marisa Clarke PA-C at 2/13/2017 11:01 AM     Author:  Marisa Clarke PA-C Service:  Hospitalist Author Type:  Physician Assistant    Filed:  2/13/2017 11:08 AM Date of Service:  2/13/2017 11:01 AM Note Created:  2/13/2017 11:01 AM    Status:  Cosign Needed :  Marisa Clarke PA-C (Physician Assistant)    Cosign Required:  Yes             [facility] Outpatient / Observation Unit  Discharge Summary        Chuyita Gayle MRN# [id number]   YOB: 1932 Age: 84 year old     Date of Admission:  2/10/2017  Date of Discharge:[CH1.1]  2/13/2017[CH1.2]  Admitting Physician:  Yomaira Rhodes,   Discharge Physician:[CH1.1] Marisa Clarke PA-C[CH1.2]  Discharging Service: Hospitalist      Primary Provider: Pallas, Kenneth G  Primary Care Physician Phone Number: [phone]         Primary Discharge Diagnoses:    Chuyita Gayle was admitted on 2/10/2017 for acute on chronic left hip pain.    1. Acute on chronic left hip pain 2/2 OA--improved with scheduled tylenol.         Secondary Discharge Diagnoses:     Past Medical History   Diagnosis Date     A-fib (H)      Anxiety      Fibromyalgia      Uncomplicated asthma             Code Status:      Full Code        Brief Hospital Summary:       Reason for your hospital stay      You were admitted for concerns of left hip pain. Xrays did not show any   evidence of fracture. Your pain is likely secondary to chronic arthritis.    You will get cough medication for   supportive measures. Make a follow up appt with PCP in 1 week.           Please refer to initial admission history and physical for further details.   Briefly, Chuyita Gayle was admitted on 2/10/2017 for acute on chronic left hip pain.  Initial work up in the ED did not reveal evidence of significant neurologic deficits concerning for cauda equina syndrome. No infectious or malignant process was identified. XRAY of the hip was negative.  Pt was registered to  the Observation Unit for further evaluation and pain control.   Pt was placed on oral multi-modal pain regiment and was ambulated in the ham. \She was seen by PT and recommended increased services at [facility].  On the day of discharge, pt's pain improved and is able to perform basic ADLs. Safe discharge plan was identified and pt was discharged with po pain meds, education regarding home therapies and set up with follow up care.         Significant Lab During Hospitalization:[CH1.1]        Recent Labs  Lab 02/11/17  0615 02/10/17  1450   WBC 5.5 6.3   HGB 11.1* 12.9   HCT 34.0* 38.8   MCV 94 94    287       Recent Labs  Lab 02/11/17  0615 02/10/17  1450    139   POTASSIUM 4.1 4.2   CHLORIDE 105 104   CO2 26 26   ANIONGAP 7 9   * 106*   BUN 16 13   CR 0.86 0.86   GFRESTIMATED 62 63   GFRESTBLACK 75 76   TRACEE 8.9 9.1       Recent Labs  Lab 02/13/17  0600 02/12/17  0605 02/11/17  0615   INR 2.98* 2.78* 2.13*     No results for input(s): TSH in the last 168 hours.  No results for input(s): TROPONIN, TROPI, TROPR in the last 168 hours.    Invalid input(s): TROP, TROPONINIES  No results for input(s): COLOR, APPEARANCE, URINEGLC, URINEBILI, URINEKETONE, SG, UBLD, URINEPH, PROTEIN, UROBILINOGEN, NITRITE, LEUKEST, RBCU, WBCU in the last 168 hours.[CH1.3]             Significant Imaging During Hospitalization:[CH1.1]      Results for orders placed or performed during the hospital encounter of 02/10/17   XR Pelvis and Hip Left 2 Views    Narrative    XR PELVIS AND HIP LEFT 2 VIEWS 2/10/2017 1:44 PM    HISTORY: Hip pain. Worse overnight.    COMPARISON: None.    FINDINGS: No fracture or malalignment. Mild symmetric osteoarthritis  at the hips characterized by some loss of joint space and small  marginal osteophytes. Sacroiliac joints are patent and symmetric.      Impression    IMPRESSION: Mild degenerative change. No acute abnormality.    TARYN ZAPIEN MD[CH1.3]              Pending Results:        Unresulted Labs  Ordered in the Past 30 Days of this Admission     No orders found from 12/12/2016 to 2/11/2017.              Consultations This Hospital Stay:      Consultation during this admission received from PT         Discharge Instructions and Follow-Up:      Follow up with primary care provider, Kenneth G. Pallas, within 7 days   for hospital follow- up.  No follow up labs or test are needed.            Discharge Disposition:      Discharged to home         Discharge Medications:        Current Discharge Medication List      START taking these medications    Details   acetaminophen (TYLENOL) 500 MG tablet Take 2 tablets (1,000 mg) by mouth 3 times daily  Qty: 100 tablet, Refills: 0    Associated Diagnoses: Hip pain, left      guaiFENesin-dextromethorphan (ROBITUSSIN DM) 100-10 MG/5ML syrup Take 5-10 mLs by mouth every 4 hours as needed for cough  Qty: 354 mL, Refills: 0    Associated Diagnoses: Cough      cetirizine-psuedoePHEDrine (ZYRTEC-D) 5-120 MG per 12 hr tablet Take 1 tablet by mouth every 12 hours as needed for allergies  Qty: 28 tablet, Refills: 0    Associated Diagnoses: Cough         CONTINUE these medications which have NOT CHANGED    Details   atenolol (TENORMIN) 25 MG tablet Take 25 mg by mouth 2 times daily      fluticasone-vilanterol (BREO ELLIPTA) 100-25 MCG/INH oral inhaler Inhale 1 puff into the lungs daily      Omega-3 Fatty Acids (FISH OIL) 1200 MG CAPS Take 1 capsule by mouth every morning      gabapentin (NEURONTIN) 300 MG capsule Take 600 mg by mouth At Bedtime      multivitamin, therapeutic (THERA-VIT) TABS tablet Take 1 tablet by mouth daily      omeprazole (PRILOSEC) 20 MG CR capsule Take 20 mg by mouth daily      cholecalciferol (VITAMIN D) 1000 UNIT tablet Take 1,000 Units by mouth daily      WARFARIN SODIUM PO 1.25 mg MWF and 2.5 mg ROW      albuterol (2.5 MG/3ML) 0.083% neb solution Take 1 vial by nebulization every 4 hours as needed for shortness of breath / dyspnea or wheezing       fluticasone (FLONASE) 50 MCG/ACT spray Spray 1 spray into both nostrils daily as needed for rhinitis or allergies      LORazepam (ATIVAN) 0.5 MG tablet Take 0.5 mg by mouth 2 times daily as needed for anxiety      albuterol (PROAIR HFA/PROVENTIL HFA/VENTOLIN HFA) 108 (90 BASE) MCG/ACT Inhaler Inhale 1 puff into the lungs every 4 hours as needed for shortness of breath / dyspnea or wheezing      senna-docusate (SENOKOT-S;PERICOLACE) 8.6-50 MG per tablet Take 1 tablet by mouth 2 times daily as needed for constipation      valACYclovir (VALTREX) 500 MG tablet Take 500 mg by mouth 2 times daily as needed For 3 days prn      cetirizine (ZYRTEC) 10 MG tablet Take 10 mg by mouth daily as needed for allergies         STOP taking these medications       naproxen (NAPROSYN) 500 MG tablet Comments:   Reason for Stopping:                   Allergies:         Allergies   Allergen Reactions     Nabumetone Rash     Sulfa Drugs Rash     Vioxx [Rofecoxib] Rash     Celebrex [Celecoxib] Rash           Condition and Physical on Discharge:      Discharge condition: Stable   Vitals: Blood pressure 175/77, pulse 82, temperature 96  F (35.6  C), temperature source Oral, resp. rate 20, weight 70.3 kg (155 lb), SpO2 92 %.  155 lbs 0 oz      GENERAL:  Comfortable.  PSYCH: pleasant, oriented, No acute distress.  HEENT:  PERRLA. Normal conjunctiva, normal hearing, nasal mucosa and Oropharynx are normal.  NECK:  Supple, no neck vein distention, adenopathy or bruits, normal thyroid.  HEART:  Normal S1, S2 with no murmur, no pericardial rub, gallops or S3 or S4.  LUNGS:  Clear to auscultation, normal Respiratory effort. No wheezing, rales or ronchi.  ABDOMEN:  Soft, no hepatosplenomegaly, normal bowel sounds. Non-tender, non distended.   EXTREMITIES:  No pedal edema, +2 pulses bilateral and equal. Back symmetric, no curvature. ROM normal. No CVA tenderness. negative findings: no skin lesions, erythema, or scars, no tenderness to percussion or  palpitation, no tenderness to palpation   SKIN:  Dry to touch, No rash, wound or ulcerations.  NEUROLOGIC:  CN 2-12 intact, BL 5/5 symmetric upper and lower extremity strength, sensation is intact with no focal deficits.[CH1.1]          Revision History        User Key Date/Time User Provider Type Action    > CH1.3 2/13/2017 11:08 AM Vince, Marisa Lomeli PA-C Physician Assistant Sign     CH1.2 2/13/2017 11:02 AM Vince, Marisa Lomeli PA-C Physician Assistant      CH1.1 2/13/2017 11:01 AM Vince, Marisa Lomeli PA-C Physician Assistant                      Consult Notes      Consults by Mohini Salinas LSW at 2/11/2017  3:22 PM     Author:  Mohini Salinas LSW Service:  (none) Author Type:      Filed:  2/11/2017  3:27 PM Date of Service:  2/11/2017  3:22 PM Note Created:  2/11/2017  3:22 PM    Status:  Signed :  Mohini Salinas LSW ()     Consult Orders:    1. Social Work IP Consult [[id number]] ordered by Taylor Torres PA-C at 02/10/17 1950                .Care Transition Initial Assessment - SW  Reason For Consult: discharge planning  Met with: SW met with pt. Pt was sleepy. SW called granddaughter Carolina. Carolina stated that pt lives at assisted living called [facility] in [place]. Family is fine with pt going to TCU but would like to go to back to facility if they can provide the same care. SW called [facility] and left a voice mail for Nurse Hanna) [phone].   Active Problems:    Left hip pain         DATA  Lives With: alone  Living Arrangements: apartment  Description of Support System: Supportive, Involved  Who is your support system?: Other (specify) (Grandchildren) Carolina  Support Assessment: Adequate family and caregiver support, Adequate social supports.               Quality Of Family Relationships: supportive, involved  Transportation Available: family or friend will provide         PLAN  SW will follow. TCU vs back to facility       Revision History        User Key  Date/Time User Provider Type Action    > [N/A] 2/11/2017  3:27 PM Mohini Salinas LSW  Sign                  Progress Notes - Physician (Notes for yesterday and today)     No notes of this type exist for this encounter.      Procedure Notes     No notes of this type exist for this encounter.         Progress Notes - Therapies (Notes from 02/10/17 through 02/13/17)      Progress Notes by Lc Medina PT at 2/11/2017  2:54 PM     Author:  Lc Medina PT Service:  (none) Author Type:  Physical Therapist    Filed:  2/11/2017  2:54 PM Date of Service:  2/11/2017  2:54 PM Note Created:  2/11/2017  2:54 PM    Status:  Signed :  Lc Medina PT (Physical Therapist)          02/11/17 1410   Quick Adds   Type of Visit Initial PT Evaluation   Living Environment   Lives With alone   Living Arrangements apartment  (senior apartment)   Home Accessibility no concerns   Number of Stairs to Enter Home 0   Number of Stairs Within Home 0   Transportation Available family or friend will provide  (pt has car however states family requests she not drive)   Living Environment Comment PT lives in senior apartment alone, recently increased cares to have assist with showering, has weekly cleaning service and laundry, and goes to dining room for one meal/day.   Self-Care   Usual Activity Tolerance moderate   Current Activity Tolerance poor   Regular Exercise yes   Activity/Exercise Type strength training;walking   Exercise Amount/Frequency 3-5 times/wk   Equipment Currently Used at Home walker, rolling   Activity/Exercise/Self-Care Comment Pt attended fitness class regularly in facility until past few weeks when feeling more weak.    Functional Level Prior   Ambulation 1-->assistive equipment   Transferring 1-->assistive equipment   Toileting 0-->independent   Bathing 2-->assistive person   Dressing 0-->independent   Eating 0-->independent   Communication 0-->understands/communicates without difficulty   Swallowing  0-->swallows foods/liquids without difficulty   Cognition 0 - no cognition issues reported   Fall history within last six months no   General Information   Onset of Illness/Injury or Date of Surgery - Date 02/10/17   Referring Physician Taylor Torres PA-C   Patient/Family Goals Statement None stated.   Pertinent History of Current Problem (include personal factors and/or comorbidities that impact the POC) Pt presented to ED with increased hip pain, history of OA treated by steroid injections.  History of fibromyalgia.   Cognitive Status Examination   Orientation orientation to person, place and time   Level of Consciousness alert   Follows Commands and Answers Questions 100% of the time   Personal Safety and Judgment intact   Memory intact   Pain Assessment   Patient Currently in Pain No   Integumentary/Edema   Integumentary/Edema no deficits were identifed   Posture    Posture Forward head position;Protracted shoulders   Range of Motion (ROM)   ROM Comment LE ROM is WNL   Strength   Strength Comments Pt demonstrates against gravity ankle DF/knee extension, able to take minimal resistance.   Bed Mobility   Bed Mobility Comments Not assessed as pt sitting in chair upon arrival.   Transfer Skills   Transfer Comments Sit to stand with modA x 3 reps, heavy UE use on walker.   Gait   Gait Comments Not assessed as pt not stable in standing in WW.   Sensory Examination   Sensory Perception no deficits were identified   General Therapy Interventions   Planned Therapy Interventions bed mobility training;gait training;strengthening;transfer training   Clinical Impression   Criteria for Skilled Therapeutic Intervention yes, treatment indicated   PT Diagnosis Difficulty Walking   Influenced by the following impairments pain, weakness   Functional limitations due to impairments Decreased independence with functional mobility.   Clinical Presentation Stable/Uncomplicated   Clinical Presentation Rationale Pt current status is  "stable and unchanging, clinical judgement   Clinical Decision Making (Complexity) Low complexity   Therapy Frequency` daily   Predicted Duration of Therapy Intervention (days/wks) 1-2 days   Anticipated Discharge Disposition Home with Home Therapy;Home with Assist;Transitional Care Facility   Risk & Benefits of therapy have been explained Yes   Patient, Family & other staff in agreement with plan of care Yes   [facility] TM \"6 Clicks\"   2016, Trustees of [facility], under license to [employer].  All rights reserved.   6 Clicks Short Forms Basic Mobility Inpatient Short Form   [facility] AM-PAC  \"6 Clicks\" V.2 Basic Mobility Inpatient Short Form   1. Turning from your back to your side while in a flat bed without using bedrails? 3 - A Little   2. Moving from lying on your back to sitting on the side of a flat bed without using bedrails? 3 - A Little   3. Moving to and from a bed to a chair (including a wheelchair)? 2 - A Lot   4. Standing up from a chair using your arms (e.g., wheelchair, or bedside chair)? 2 - A Lot   5. To walk in hospital room? 2 - A Lot   6. Climbing 3-5 steps with a railing? 1 - Total   Basic Mobility Raw Score (Score out of 24.Lower scores equate to lower levels of function) 13   Total Evaluation Time   Total Evaluation Time (Minutes) 15        Revision History        User Key Date/Time User Provider Type Action    > [N/A] 2/11/2017  2:54 PM Lc Medina, PT Physical Therapist Sign                                                      INTERAGENCY TRANSFER FORM - LAB / IMAGING / EKG / EMG RESULTS   2/10/2017                       [facility] OBSERVATION DEPARTMENT: [phone]            Unresulted Labs (24h ago through future)    Start       Ordered    02/11/17 0600  INR  (warfarin (COUMADIN) Pharmacy Consult-INITIAL ORDER)  DAILY,   Routine      02/10/17 1709         Lab Results - 3 Days      INR [[id number]] (Abnormal)  Resulted: 02/13/17 0649, " Result status: Final result    Ordering provider: Taylor Torres PA-C  02/13/17 0001 Resulting lab: [facility]    Specimen Information    Type Source Collected On   Blood  02/13/17 0600          Components       Value Reference Range Flag Lab   INR 2.98 0.86 - 1.14 H FrRdHs            INR [[id number]] (Abnormal)  Resulted: 02/12/17 0700, Result status: Final result    Ordering provider: Taylor Torres PA-C  02/11/17 2151 Resulting lab: [facility]    Specimen Information    Type Source Collected On   Blood  02/12/17 0605          Components       Value Reference Range Flag Lab   INR 2.78 0.86 - 1.14 H FrRdHs            Basic metabolic panel [[id number]] (Abnormal)  Resulted: 02/11/17 0647, Result status: Final result    Ordering provider: Taylor Torres PA-C  02/11/17 0000 Resulting lab: [facility]    Specimen Information    Type Source Collected On   Blood  02/11/17 0615          Components       Value Reference Range Flag Lab   Sodium 138 133 - 144 mmol/L  FrRdHs   Potassium 4.1 3.4 - 5.3 mmol/L  FrRdHs   Chloride 105 94 - 109 mmol/L  FrRdHs   Carbon Dioxide 26 20 - 32 mmol/L  FrRdHs   Anion Gap 7 3 - 14 mmol/L  FrRdHs   Glucose 102 70 - 99 mg/dL H FrRdHs   Urea Nitrogen 16 7 - 30 mg/dL  FrRdHs   Creatinine 0.86 0.52 - 1.04 mg/dL  FrRdHs   GFR Estimate 62 >60 mL/min/1.7m2  FrRdHs   Comment:  Non  GFR Calc   GFR Estimate If Black 75 >60 mL/min/1.7m2  FrRdHs   Comment:  African American GFR Calc   Calcium 8.9 8.5 - 10.1 mg/dL  FrRdHs            INR [[id number]] (Abnormal)  Resulted: 02/11/17 0634, Result status: Final result    Ordering provider: Taylor Torres PA-C  02/11/17 0000 Resulting lab: [facility]    Specimen Information    Type Source Collected On   Blood  02/11/17 0615          Components       Value Reference Range Flag Lab   INR 2.13 0.86 - 1.14 H FrRdHs            CBC with platelets differential [[id number]]  (Abnormal)  Resulted: 02/11/17 0624, Result status: Final result    Ordering provider: Taylor Torres PA-C  02/11/17 0000 Resulting lab: [facility]    Specimen Information    Type Source Collected On   Blood  02/11/17 0615          Components       Value Reference Range Flag Lab   WBC 5.5 4.0 - 11.0 10e9/L  FrRdHs   RBC Count 3.60 3.8 - 5.2 10e12/L L FrRdHs   Hemoglobin 11.1 11.7 - 15.7 g/dL L FrRdHs   Hematocrit 34.0 35.0 - 47.0 % L FrRdHs   MCV 94 78 - 100 fl  FrRdHs   MCH 30.8 26.5 - 33.0 pg  FrRdHs   MCHC 32.6 31.5 - 36.5 g/dL  FrRdHs   RDW 13.1 10.0 - 15.0 %  FrRdHs   Platelet Count 251 150 - 450 10e9/L  FrRdHs   Diff Method Automated Method   FrRdHs   % Neutrophils 38.3 %  FrRdHs   % Lymphocytes 45.3 %  FrRdHs   % Monocytes 10.8 %  FrRdHs   % Eosinophils 4.9 %  FrRdHs   % Basophils 0.5 %  FrRdHs   % Immature Granulocytes 0.2 %  FrRdHs   Nucleated RBCs 0 0 /100  FrRdHs   Absolute Neutrophil 2.1 1.6 - 8.3 10e9/L  FrRdHs   Absolute Lymphocytes 2.5 0.8 - 5.3 10e9/L  FrRdHs   Absolute Monocytes 0.6 0.0 - 1.3 10e9/L  FrRdHs   Absolute Eosinophils 0.3 0.0 - 0.7 10e9/L  FrRdHs   Absolute Basophils 0.0 0.0 - 0.2 10e9/L  FrRdHs   Abs Immature Granulocytes 0.0 0 - 0.4 10e9/L  FrRdHs   Absolute Nucleated RBC 0.0   FrRdHs            INR [[id number]] (Abnormal)  Resulted: 02/10/17 1915, Result status: Final result    Ordering provider: Taylor Torres PA-C  02/10/17 1756 Resulting lab: [facility]    Specimen Information    Type Source Collected On   Blood  02/10/17 1855          Components       Value Reference Range Flag Lab   INR 1.95 0.86 - 1.14 H FrRdHs            Basic metabolic panel [[id number]] (Abnormal)  Resulted: 02/10/17 1540, Result status: Final result    Ordering provider: Marisa Harris MD  02/10/17 1424 Resulting lab: [facility]    Specimen Information    Type Source Collected On   Blood  02/10/17 1450          Components       Value Reference  Range Flag Lab   Sodium 139 133 - 144 mmol/L  FrRdHs   Potassium 4.2 3.4 - 5.3 mmol/L  FrRdHs   Chloride 104 94 - 109 mmol/L  FrRdHs   Carbon Dioxide 26 20 - 32 mmol/L  FrRdHs   Anion Gap 9 3 - 14 mmol/L  FrRdHs   Glucose 106 70 - 99 mg/dL H FrRdHs   Urea Nitrogen 13 7 - 30 mg/dL  FrRdHs   Creatinine 0.86 0.52 - 1.04 mg/dL  FrRdHs   GFR Estimate 63 >60 mL/min/1.7m2  FrRdHs   Comment:  Non  GFR Calc   GFR Estimate If Black 76 >60 mL/min/1.7m2  FrRdHs   Comment:  African American GFR Calc   Calcium 9.1 8.5 - 10.1 mg/dL  FrRdHs            CBC with platelets differential [[id number]]  Resulted: 02/10/17 1515, Result status: Final result    Ordering provider: Marisa Harris MD  02/10/17 1424 Resulting lab: [facility]    Specimen Information    Type Source Collected On   Blood  02/10/17 1450          Components       Value Reference Range Flag Lab   WBC 6.3 4.0 - 11.0 10e9/L  FrRdHs   RBC Count 4.13 3.8 - 5.2 10e12/L  FrRdHs   Hemoglobin 12.9 11.7 - 15.7 g/dL  FrRdHs   Hematocrit 38.8 35.0 - 47.0 %  FrRdHs   MCV 94 78 - 100 fl  FrRdHs   MCH 31.2 26.5 - 33.0 pg  FrRdHs   MCHC 33.2 31.5 - 36.5 g/dL  FrRdHs   RDW 13.0 10.0 - 15.0 %  FrRdHs   Platelet Count 287 150 - 450 10e9/L  FrRdHs   Diff Method Automated Method   FrRdHs   % Neutrophils 38.9 %  FrRdHs   % Lymphocytes 46.9 %  FrRdHs   % Monocytes 9.9 %  FrRdHs   % Eosinophils 3.7 %  FrRdHs   % Basophils 0.3 %  FrRdHs   % Immature Granulocytes 0.3 %  FrRdHs   Nucleated RBCs 0 0 /100  FrRdHs   Absolute Neutrophil 2.4 1.6 - 8.3 10e9/L  FrRdHs   Absolute Lymphocytes 2.9 0.8 - 5.3 10e9/L  FrRdHs   Absolute Monocytes 0.6 0.0 - 1.3 10e9/L  FrRdHs   Absolute Eosinophils 0.2 0.0 - 0.7 10e9/L  FrRdHs   Absolute Basophils 0.0 0.0 - 0.2 10e9/L  FrRdHs   Abs Immature Granulocytes 0.0 0 - 0.4 10e9/L  FrRdHs   Absolute Nucleated RBC 0.0   FrRdHs            Testing Performed By     Lab - Abbreviation Name Director Address Valid Date Range    12 -  [facility] Unknown [address] [facility] [address] 05/08/15 1057 - Present               Imaging Results - 3 Days      XR Pelvis and Hip Left 2 Views [[id number]]  Resulted: 02/10/17 1353, Result status: Final result    Ordering provider: Marisa Harris MD  02/10/17 1232 Resulted by: Taryn Zapien MD    Performed: 02/10/17 1330 - 02/10/17 1344 Resulting lab: RADIOLOGY RESULTS    Narrative:       XR PELVIS AND HIP LEFT 2 VIEWS 2/10/2017 1:44 PM    HISTORY: Hip pain. Worse overnight.    COMPARISON: None.    FINDINGS: No fracture or malalignment. Mild symmetric osteoarthritis  at the hips characterized by some loss of joint space and small  marginal osteophytes. Sacroiliac joints are patent and symmetric.      Impression:       IMPRESSION: Mild degenerative change. No acute abnormality.    TARYN ZAPIEN MD      Testing Performed By     Lab - Abbreviation Name Director Address Valid Date Range    104 - [facility] RADIOLOGY RESULTS Unknown Unknown 02/16/05 1553 - Present            Encounter-Level Documents:     There are no encounter-level documents.      Order-Level Documents:     There are no order-level documents.

## 2017-02-10 NOTE — ED PROVIDER NOTES
History     Chief Complaint:  Hip Pain    HPI   Chuyita Gayle is a 84 year old female with history of chronic hip pain and atrial fibrillation on Coumadin who presents to the emergency department today with left hip pain that worsened suddenly last night. Patient was sleeping in bed when pain awoke her. She continued to be weight bearing this morning and walked this morning, but presents after rapid exacerbation of her pain. She notes that she is now slower as a result and having trouble walking. Naproxen daily for pain provides poor relief and she reports taking Tylenol for breakthrough pain, but has not had any today; assisted living staff administered pain control, but patient is unsure of what this was. She rated pain as 5/10 with triage. In the past, patient had received shots to the joint in clinic at [facility] and [facility], and she requests pain control here. Patient denies recent falls, injuries, or illness. She also reports no numbness or weakness. No other concerns were voiced at this time.     Allergies:  Nabumetone  Sulfa drugs  Vioxx  Celebrex      Medications:    Dorzolamide  Advair  Albuterol  Flonase  Prilosec  Neurontin  Atenolol   Coumadin  Percocet    Past Medical History:    Atrial fibrillation  Fibromyalgia  Asthma  Anxiety    Arthritis     Past Surgical History:    Gi surgery  Cholecystectomy  Orthopedic surgery  Back surgery     Family History:    History reviewed. No pertinent family history.     Social History:  The patient was accompanied to the ED by EMS.  Smoking Status: negative  Alcohol Use: negative     Review of Systems   Musculoskeletal: Positive for arthralgias (left hip).   Neurological: Negative for tremors and numbness.   All other systems reviewed and are negative.    Physical Exam   First Vitals:  BP: (!) 179/91 mmHg  Pulse: 90  Heart Rate: 90  Temp: 97.6  F (36.4  C)  Resp: 18  Weight: 70.308 kg (155 lb)  SpO2: 97 %    Physical Exam  Eyes:  Sclera white; Pupils are  equal and round  ENT:    External ears and nares normal  CV:  Regular rate and rhythm, No murmur   Resp:  Breath sounds clear and equal bilaterally  GI:  Abdomen is soft, non-tender, non-distended  MS:  Moves all extremities  LLE:  No tenderness anterior, lateral, posterior hip.  No pain in hip with axial loading or passive ROM.  Distal CMS (toes, ankles) normal.  Skin:  Warm and dry  Neuro: Speech is normal and fluent. No apparent deficit.      Emergency Department Course     Imaging:  Radiology findings were communicated with the patient who voiced understanding of the findings.    Xray pelvis and hip left 2 views:  Mild degenerative change. No acute abnormality.  Reading per radiology    Labs:  WBC 6.3, Hgb 12.9, Plt 287  BMP glucose 106 (H), o/w WNL    Interventions:  1247 Tylenol 650 mg Oral    1449 Sublimaze 25 mcg IV  1536 Roxicodone 2.5 mg Oral     Emergency Department Course:  Nursing notes and vitals reviewed.  I performed an exam of the patient as documented above.   The patient was sent for a pelvis xray while in the emergency department, results above.     At 1444 the patient was rechecked and updated on imaging results. Nursing staff also notes that patient was unable to ambulate unassisted while in the room.  Required assist to transfer to [facility].     I discussed the treatment plan with the patient. They expressed understanding of this plan and consented to stay in the hospital. I discussed the patient with Ronda Torres PA-C, accepting for Dr Hunt, who will admit the patient to a observation medical bed for further evaluation and treatment.      Impression & Plan      Medical Decision Making:  Chuyita Gayle is a 84 year old female who presents to the emergency department today for evaluation of acute worsening of chronic left hip pain preventing ambulation with her normal walker. This is concerning fore progression of her arthritis, fracture, or dislocation. Xrays were obtained without acute  abnormality noted. She does have symptoms to suggest that this is a septic hip (fever, rash, leukocytosis), however pain is not controlled with additional medications here. She has had good relief in the past with intraarticular injections in the past, however this is not a procedure I perform. She was unable to ambulate to even get to the bathroom and therefore is not appropriate for discharge to her assisted living facility. She will be brought into the hospital to continue symptoms management     Diagnosis:    ICD-10-CM    1. Hip pain, left M25.552 CBC with platelets differential     Basic metabolic panel       Disposition:   Admission under hospitalists service.    Scribe Disclosure:  I, Osito Carrillo, am serving as a scribe at 12:30 PM on 2/10/2017 to document services personally performed by Marisa Harris, *, based on my observations and the provider's statements to me.    [facility] EMERGENCY DEPARTMENT        Marisa Harris MD  02/10/17 7041

## 2017-02-10 NOTE — ED NOTES
BP elevated.  Patient does not recall if in fact she received her blood pressure medications this morning or not.  Patient is alert, orientated, cooperative.  Patient needs assistance in transfering from bedside commode to bed.  Patient states that her pain is improved after Fentyl IV.  Telephone report to receiving RN in Observation.  All questions answered.  Patient shown the Observation video.  Patient kept updated regarding admission.  All questions answered.

## 2017-02-10 NOTE — ED NOTES
OBSERVATION patient IN TIME: 1615  ROOM #    Living Situation (if not independent, order SW consult): AL  Facility name:    Activity level at baseline: Ind with walker  Activity level on admit: up with 2    Is patient a falls risk? Yes  Reason for falls risk:  Mobility  Falls armband on? YES,   Within Arm's Reach? Yes   Bed alarm turned on?   YES,   Personal alarm in place and turned on?   No    Patient registered to observation; given Patient Bill of Rights; given the opportunity to ask questions about observation status and their plan of care.  Patient has been oriented to the observation room, bathroom, and call light is in place.    :

## 2017-02-10 NOTE — ED NOTES
PRIMARY DIAGNOSIS: Acute Traumatic Pain  OUTPATIENT/OBSERVATION GOALS TO BE MET BEFORE DISCHARGE:    1. Tolerate PO Intake: Yes  2. Cleared for discharge from consultants involved: No  3. ADLs back to baseline?  No  4. Activity and level of assistance: Up with maximum assistance. Consider SW and/or PT evaluation.   5. Pain status: Improved-controlled with oral pain medications.  6. Barriers to discharge noted Yes PT    Is patient a falls risk? Yes  Reason for falls risk:  Mobility  Falls armband on?  YES  Within Arm's Reach? Yes   Bed alarm turned on?   YES  Personal alarm in place and turned on?   No    Pt is alert and oriented. States pain of 1-2/10 after pain meds in ED. States pain in L hip makes her

## 2017-02-11 NOTE — ED NOTES
PRIMARY DIAGNOSIS: Acute Traumatic Pain  OUTPATIENT/OBSERVATION GOALS TO BE MET BEFORE DISCHARGE:    1. Tolerate PO Intake: Yes  2. Cleared for discharge from consultants involved: No PT/SW consult  3. ADLs back to baseline?  No  4. Activity and level of assistance: Up with maximum assistance.   5. Pain status: Improved-controlled with oral pain medications.  6. Barriers to discharge noted Yes PT/SW    Is patient a falls risk? Yes  Reason for falls risk:  Mobility  Falls armband on?  YES  Within Arm's Reach? Yes   Bed alarm turned on?   YES  Personal alarm in place and turned on?   No    Pt is alert and oriented x4, BP elevated, scheduled metoprolol given.  Patient c/o pain Left hip pain 3/10 while in bed but increases significantly with ambulation. A-2 with omar steady used for ambulation. Scheduled tylenol and flexeril given. Patient is tolerating regular diet. IV site is saline locked.  Takes coumadin for hx of A-fib. Will continue to monitor, assess, and offer supportive cares.

## 2017-02-11 NOTE — PROGRESS NOTES
02/11/17 1410   Quick Adds   Type of Visit Initial PT Evaluation   Living Environment   Lives With alone   Living Arrangements apartment  (senior apartment)   Home Accessibility no concerns   Number of Stairs to Enter Home 0   Number of Stairs Within Home 0   Transportation Available family or friend will provide  (pt has car however states family requests she not drive)   Living Environment Comment PT lives in senior apartment alone, recently increased cares to have assist with showering, has weekly cleaning service and laundry, and goes to dining room for one meal/day.   Self-Care   Usual Activity Tolerance moderate   Current Activity Tolerance poor   Regular Exercise yes   Activity/Exercise Type strength training;walking   Exercise Amount/Frequency 3-5 times/wk   Equipment Currently Used at Home walker, rolling   Activity/Exercise/Self-Care Comment Pt attended fitness class regularly in facility until past few weeks when feeling more weak.    Functional Level Prior   Ambulation 1-->assistive equipment   Transferring 1-->assistive equipment   Toileting 0-->independent   Bathing 2-->assistive person   Dressing 0-->independent   Eating 0-->independent   Communication 0-->understands/communicates without difficulty   Swallowing 0-->swallows foods/liquids without difficulty   Cognition 0 - no cognition issues reported   Fall history within last six months no   General Information   Onset of Illness/Injury or Date of Surgery - Date 02/10/17   Referring Physician Taylor Torres PA-C   Patient/Family Goals Statement None stated.   Pertinent History of Current Problem (include personal factors and/or comorbidities that impact the POC) Pt presented to ED with increased hip pain, history of OA treated by steroid injections.  History of fibromyalgia.   Cognitive Status Examination   Orientation orientation to person, place and time   Level of Consciousness alert   Follows Commands and Answers Questions 100% of the time  "  Personal Safety and Judgment intact   Memory intact   Pain Assessment   Patient Currently in Pain No   Integumentary/Edema   Integumentary/Edema no deficits were identifed   Posture    Posture Forward head position;Protracted shoulders   Range of Motion (ROM)   ROM Comment LE ROM is WNL   Strength   Strength Comments Pt demonstrates against gravity ankle DF/knee extension, able to take minimal resistance.   Bed Mobility   Bed Mobility Comments Not assessed as pt sitting in chair upon arrival.   Transfer Skills   Transfer Comments Sit to stand with modA x 3 reps, heavy UE use on walker.   Gait   Gait Comments Not assessed as pt not stable in standing in WW.   Sensory Examination   Sensory Perception no deficits were identified   General Therapy Interventions   Planned Therapy Interventions bed mobility training;gait training;strengthening;transfer training   Clinical Impression   Criteria for Skilled Therapeutic Intervention yes, treatment indicated   PT Diagnosis Difficulty Walking   Influenced by the following impairments pain, weakness   Functional limitations due to impairments Decreased independence with functional mobility.   Clinical Presentation Stable/Uncomplicated   Clinical Presentation Rationale Pt current status is stable and unchanging, clinical judgement   Clinical Decision Making (Complexity) Low complexity   Therapy Frequency` daily   Predicted Duration of Therapy Intervention (days/wks) 1-2 days   Anticipated Discharge Disposition Home with Home Therapy;Home with Assist;Transitional Care Facility   Risk & Benefits of therapy have been explained Yes   Patient, Family & other staff in agreement with plan of care Yes   [facility] [employer] TM \"6 Clicks\"   2016, Trustees of [facility], under license to [employer].  All rights reserved.   6 Clicks Short Forms Basic Mobility Inpatient Short Form   [facility] AM-PAC  \"6 Clicks\" V.2 Basic Mobility Inpatient Short Form   1. Turning from " your back to your side while in a flat bed without using bedrails? 3 - A Little   2. Moving from lying on your back to sitting on the side of a flat bed without using bedrails? 3 - A Little   3. Moving to and from a bed to a chair (including a wheelchair)? 2 - A Lot   4. Standing up from a chair using your arms (e.g., wheelchair, or bedside chair)? 2 - A Lot   5. To walk in hospital room? 2 - A Lot   6. Climbing 3-5 steps with a railing? 1 - Total   Basic Mobility Raw Score (Score out of 24.Lower scores equate to lower levels of function) 13   Total Evaluation Time   Total Evaluation Time (Minutes) 15

## 2017-02-11 NOTE — PHARMACY-ANTICOAGULATION SERVICE
Clinical Pharmacy - Warfarin Dosing Consult     Pharmacy has been consulted to manage this patient s warfarin therapy.  Indication: Atrial Fibrillation  Therapy Goal: INR 2-3  Warfarin Prior to Admission: Yes  Warfarin PTA Regimen: Warfarin 1.25 mg MWF and 2.5 mg ROW  Significant drug interactions: Naproxen prn  Recent documented change in oral intake/nutrition: Unknown  Dose Comments: 2.5mg tonight due to INR <2    INR   Date Value Ref Range Status   02/10/2017 1.95* 0.86 - 1.14 Final      Pharmacy will monitor Chuyita Gayle daily and order warfarin doses to achieve specified goal.      Please contact pharmacy as soon as possible if the warfarin needs to be held for a procedure or if the warfarin goals change.

## 2017-02-11 NOTE — ED NOTES
PRIMARY DIAGNOSIS: Acute Traumatic Pain  OUTPATIENT/OBSERVATION GOALS TO BE MET BEFORE DISCHARGE:    1. Tolerate PO Intake: Yes  2. Cleared for discharge from consultants involved: No PT/SW consult  3. ADLs back to baseline?  No  4. Activity and level of assistance: Up with maximum assistance.   5. Pain status: Improved-controlled with oral pain medications.  6. Barriers to discharge noted Yes PT/SW    Is patient a falls risk? Yes  Reason for falls risk:  Mobility  Falls armband on?  YES  Within Arm's Reach? Yes   Bed alarm turned on?   YES  Personal alarm in place and turned on?   No    Pt is sleepy but oriented x4, VSS, on Oxygen 2 Liters via nasal cannula.  Patient denies pain after Oxycodone given.  A-2 with omar rico for transfers.  IV site is saline locked. Will continue to monitor, assess, and offer supportive cares.

## 2017-02-11 NOTE — CONSULTS
.Care Transition Initial Assessment - DREW  Reason For Consult: discharge planning  Met with: SW met with pt. Pt was sleepy. SW called granddaughter Carolina. Carolina stated that pt lives at assisted living called [facility] in [place]. Family is fine with pt going to TCU but would like to go to back to facility if they can provide the same care. DREW called [facility] and left a voice mail for Nurse (Cordelia) [phone].   Active Problems:    Left hip pain         DATA  Lives With: alone  Living Arrangements: apartment  Description of Support System: Supportive, Involved  Who is your support system?: Other (specify) (Grandchildren) Carolina  Support Assessment: Adequate family and caregiver support, Adequate social supports.               Quality Of Family Relationships: supportive, involved  Transportation Available: family or friend will provide         PLAN  SW will follow. TCU vs back to facility

## 2017-02-11 NOTE — ED NOTES
PRIMARY DIAGNOSIS: Acute Traumatic Pain  OUTPATIENT/OBSERVATION GOALS TO BE MET BEFORE DISCHARGE:    1. Tolerate PO Intake: Yes  2. Cleared for discharge from consultants involved: No PT/SW consult  3. ADLs back to baseline?  No  4. Activity and level of assistance: Up with maximum assistance.   5. Pain status: Improved-controlled with oral pain medications.  6. Barriers to discharge noted Yes PT/SW    Is patient a falls risk? Yes  Reason for falls risk:  Mobility  Falls armband on?  YES  Within Arm's Reach? Yes   Bed alarm turned on?   YES  Personal alarm in place and turned on?   No    Pt is alert and oriented x4, VSS, except 02 fluctuated between 88-90%.  Oxygen 2 Liters via nasal cannula was placed to keep sats greater than 90%.  Oxycodone given to patient with effectiveness.  A-2 with omar steady used for ambulation.  IV site is saline locked. Will continue to monitor, assess, and offer supportive cares.

## 2017-02-11 NOTE — PLAN OF CARE
"Problem: Goal Outcome Summary  Goal: Goal Outcome Summary  PT: Eval complete, treatment initiated.  Pt demonstrates LE weakness, requiring modA for sit to stand to WW x 3 reps, requiring Viola to remain standing x 30 seconds each rep then quickly returning to sitting, pt stating \"my legs just won't hold me up\".  PT will assess pt again in the morning as pt was able to ambulate to doorway with Ax1 with nsg earlier in day.  PT anticipate discharge to TCU vs home with increased cares to senior apartment pending mobility status in AM.        "

## 2017-02-11 NOTE — H&P
[facility]    Observation Unit  Hospitalist History and Physical    Name: Chuyita Gayle    MRN: [id number]  YOB: 1932    Age: 84 year old  Date of Admission:  2/10/2017  Date of Service (when I saw the patient): 02/10/2017    Assessment and Plan  Chuyita Gayle is a 84 year old female with PMH significant for osteoarthritis, A-fib, HTN, GERD, asthma, and fibromyalgia who present with uncontrolled left hip pain and weakness.      1. Left hip pain: no inciting fall or trauma to cause the patient's acute worsening of her left hip pain. Patient has known osteoarthritis of left hip which is likely the cause of her hip pain and increased weakness. X-ray of hip in the ED shows mild degenerative changes. The patient has had previous steroid injections in her hip with relief of her pain, unfortunately injections are not performed here on the weekend and thus the plan will be for pain control, mobilization with PT, and schedule outpatient steroid injection next week.   - Pain control with scheduled Tylenol, PRN Naproxen, Flexeril, Atarax, and Oxycodone with IV Dilaudid available for severe pain  - PRN ice to left hip  - PT consulted for evaluation   - SW consulted since patient resides at the [facility] in an independent apartment    2. A-fib: INR mildly subtherapeutic at 1.95, pharmacy to dose Warfarin  3. HTN: elevated blood pressures, suspect secondary to pain, continue Atenolol   4. Asthma: no recent exacerbations, continue Breo inhaler and PRN Albuterol inhaler   5. GERD: continue omeprazole   6. Anxiety: PTA PRN ativan available     DVT Prophylaxis: Ambulate every shift  Code Status: Full Code    Disposition: Expected discharge within 24-48 hours     Primary Care Physician  Kenneth G. Pallas    Chief Complaint  Left hip pain     History is obtained from the patient, chart review, and ED provider.   I also spoke with the ER provider about the history. Dr. Harris reports that the patient has  known hip arthritis and has previous steroid injections with improvement in her pain and she presents today due to acute worsening of her left hip pain last night and decreased mobility from this. X-ray of hip in the ED shows previously known arthritic changes. No recent falls or trauma to cause acute increased pain. Asked to admit the patient for pain control and PT consult for mobility.      History of Present Illness  Chuyita Gayle is a 84 year old female who presents with left hip pain. Patient states that she woke up in the middle of the night due to worsening left hip pain and that over the last few days she has been having a difficulty time ambulating due to increased weakness even with assistance from her walker. She notes that the pain is sharp, does not radiate, 8/10 but has improved to to 2-3/10 after pain medication, and denies numbness/tingling in her left leg. No recent trauma or fall. Her last steroid injection in her hip was about a year ago. Notes mild shortness of breath intermittently which is her baseline due to her asthma. Denies fever, N/V, chest pain, abdominal pain, urinary symptoms, or change in bowel movements. Denies any recent illness.     Past Medical History   Past Medical History   Diagnosis Date     A-fib (H)      Fibromyalgia      Uncomplicated asthma      Anxiety      Past Surgical History  Past Surgical History   Procedure Laterality Date     Gi surgery       Cholecystectomy       Orthopedic surgery       Back surgery       Prior to Admission Medications  Prior to Admission Medications   Prescriptions Last Dose Informant Patient Reported? Taking?   LORazepam (ATIVAN) 0.5 MG tablet   Yes Yes   Sig: Take 0.5 mg by mouth 2 times daily as needed for anxiety   Omega-3 Fatty Acids (FISH OIL) 1200 MG CAPS 2017 at Unknown time  Yes Yes   Sig: Take 1 capsule by mouth every morning   WARFARIN SODIUM PO   Yes Yes   Si.25 mg MWF and 2.5 mg ROW   albuterol (2.5 MG/3ML) 0.083% neb  solution   Yes Yes   Sig: Take 1 vial by nebulization every 4 hours as needed for shortness of breath / dyspnea or wheezing   albuterol (PROAIR HFA/PROVENTIL HFA/VENTOLIN HFA) 108 (90 BASE) MCG/ACT Inhaler   Yes Yes   Sig: Inhale 1 puff into the lungs every 4 hours as needed for shortness of breath / dyspnea or wheezing   atenolol (TENORMIN) 25 MG tablet 2/9/2017 at Unknown time  Yes Yes   Sig: Take 25 mg by mouth 2 times daily   cetirizine (ZYRTEC) 10 MG tablet   Yes Yes   Sig: Take 10 mg by mouth daily as needed for allergies   cholecalciferol (VITAMIN D) 1000 UNIT tablet 2/9/2017 at am  Yes Yes   Sig: Take 1,000 Units by mouth daily   fluticasone (FLONASE) 50 MCG/ACT spray   Yes Yes   Sig: Spray 1 spray into both nostrils daily as needed for rhinitis or allergies   fluticasone-vilanterol (BREO ELLIPTA) 100-25 MCG/INH oral inhaler 2/9/2017 at am  Yes Yes   Sig: Inhale 1 puff into the lungs daily   gabapentin (NEURONTIN) 300 MG capsule 2/9/2017 at Unknown time  Yes Yes   Sig: Take 600 mg by mouth At Bedtime   multivitamin, therapeutic (THERA-VIT) TABS tablet 2/9/2017 at am  Yes Yes   Sig: Take 1 tablet by mouth daily   naproxen (NAPROSYN) 500 MG tablet   Yes Yes   Sig: Take 500 mg by mouth 2 times daily as needed for moderate pain   omeprazole (PRILOSEC) 20 MG CR capsule 2/9/2017 at am  Yes Yes   Sig: Take 20 mg by mouth daily   senna-docusate (SENOKOT-S;PERICOLACE) 8.6-50 MG per tablet   Yes Yes   Sig: Take 1 tablet by mouth 2 times daily as needed for constipation   valACYclovir (VALTREX) 500 MG tablet   Yes Yes   Sig: Take 500 mg by mouth 2 times daily as needed For 3 days prn      Facility-Administered Medications: None     Allergies  Allergies   Allergen Reactions     Nabumetone Rash     Sulfa Drugs Rash     Vioxx [Rofecoxib] Rash     Celebrex [Celecoxib] Rash     Social History  Social History   Substance Use Topics     Smoking status: Not on file     Smokeless tobacco: Not on file     Alcohol Use: Not on  file     Social History     Social History Narrative     No narrative on file     Family History  Family history reviewed with patient and is noncontributory.    Review of Systems  A Comprehensive greater than 10 system review of systems was carried out.  Pertinent positives and negatives are noted above.  Otherwise negative for contributory information.    Physical Exam  Temp: 96.5  F (35.8  C) Temp src: Axillary BP: (!) 184/93 mmHg Pulse: 82 Heart Rate: 91 Resp: 16 SpO2: 98 % O2 Device: None (Room air)    Vital Signs with Ranges  Temp:  [96.5  F (35.8  C)-97.6  F (36.4  C)] 96.5  F (35.8  C)  Pulse:  [82-90] 82  Heart Rate:  [82-91] 91  Resp:  [16-20] 16  BP: (153-191)/() 184/93 mmHg  SpO2:  [94 %-98 %] 98 %  155 lbs 0 oz    GEN:  Alert, oriented x 3, appears comfortable, no overt distress  HEENT:  Normocephalic/atraumatic, no scleral icterus, no nasal discharge, mouth moist.  CV:  Regular rate and rhythm, no murmur or JVD.  S1 + S2 noted, no S3 or S4.  LUNGS:  Clear to auscultation bilaterally without rales/rhonchi/wheezing/retractions.  Symmetric chest rise on inhalation noted.  ABD:  Active bowel sounds, soft, non-tender/non-distended.  No rebound/guarding/rigidity.  EXT:  Tenderness to palpation over left SI joint. 1-2+ edema in bilateral LE.  No cyanosis.  No acute joint synovitis noted.  SKIN:  Dry to touch, no exanthems noted in the visualized areas.  NEURO:  Symmetric muscle strength, sensation to touch grossly intact.  Coordination symmetric on general exam.  No new focal deficits appreciated.    Data  Data reviewed today:  I personally reviewed all labs and imaging performed during this visit.     Results for orders placed or performed during the hospital encounter of 02/10/17   XR Pelvis and Hip Left 2 Views    Narrative    XR PELVIS AND HIP LEFT 2 VIEWS 2/10/2017 1:44 PM    HISTORY: Hip pain. Worse overnight.    COMPARISON: None.    FINDINGS: No fracture or malalignment. Mild symmetric  osteoarthritis  at the hips characterized by some loss of joint space and small  marginal osteophytes. Sacroiliac joints are patent and symmetric.      Impression    IMPRESSION: Mild degenerative change. No acute abnormality.    TARYN ZAPIEN MD   CBC with platelets differential   Result Value Ref Range    WBC 6.3 4.0 - 11.0 10e9/L    RBC Count 4.13 3.8 - 5.2 10e12/L    Hemoglobin 12.9 11.7 - 15.7 g/dL    Hematocrit 38.8 35.0 - 47.0 %    MCV 94 78 - 100 fl    MCH 31.2 26.5 - 33.0 pg    MCHC 33.2 31.5 - 36.5 g/dL    RDW 13.0 10.0 - 15.0 %    Platelet Count 287 150 - 450 10e9/L    Diff Method Automated Method     % Neutrophils 38.9 %    % Lymphocytes 46.9 %    % Monocytes 9.9 %    % Eosinophils 3.7 %    % Basophils 0.3 %    % Immature Granulocytes 0.3 %    Nucleated RBCs 0 0 /100    Absolute Neutrophil 2.4 1.6 - 8.3 10e9/L    Absolute Lymphocytes 2.9 0.8 - 5.3 10e9/L    Absolute Monocytes 0.6 0.0 - 1.3 10e9/L    Absolute Eosinophils 0.2 0.0 - 0.7 10e9/L    Absolute Basophils 0.0 0.0 - 0.2 10e9/L    Abs Immature Granulocytes 0.0 0 - 0.4 10e9/L    Absolute Nucleated RBC 0.0    Basic metabolic panel   Result Value Ref Range    Sodium 139 133 - 144 mmol/L    Potassium 4.2 3.4 - 5.3 mmol/L    Chloride 104 94 - 109 mmol/L    Carbon Dioxide 26 20 - 32 mmol/L    Anion Gap 9 3 - 14 mmol/L    Glucose 106 (H) 70 - 99 mg/dL    Urea Nitrogen 13 7 - 30 mg/dL    Creatinine 0.86 0.52 - 1.04 mg/dL    GFR Estimate 63 >60 mL/min/1.7m2    GFR Estimate If Black 76 >60 mL/min/1.7m2    Calcium 9.1 8.5 - 10.1 mg/dL   INR   Result Value Ref Range    INR 1.95 (H) 0.86 - 1.14       Taylor Torres PA-C

## 2017-02-11 NOTE — ED NOTES
PRIMARY DIAGNOSIS: Acute Traumatic Pain  OUTPATIENT/OBSERVATION GOALS TO BE MET BEFORE DISCHARGE:    1. Tolerate PO Intake: Yes  2. Cleared for discharge from consultants involved: No PT/SW consult  3. ADLs back to baseline?  No  4. Activity and level of assistance: Up with maximum assistance.   5. Pain status: Improved-controlled with oral pain medications.  6. Barriers to discharge noted Yes PT/SW    Is patient a falls risk? Yes  Reason for falls risk:  Mobility  Falls armband on?  YES  Within Arm's Reach? Yes   Bed alarm turned on?   YES  Personal alarm in place and turned on?   No    Pt is alert and oriented x4, BP elevated.  Patient c/o pain Left hip pain 4/10 while in bed  And a headache 6/10. Scheduled tylenol given. A-2 with omar steady used for ambulation.  IV site is saline locked. Will continue to monitor, assess, and offer supportive cares.

## 2017-02-11 NOTE — ED NOTES
PRIMARY DIAGNOSIS: Acute Traumatic Pain  OUTPATIENT/OBSERVATION GOALS TO BE MET BEFORE DISCHARGE:    1. Tolerate PO Intake: Yes  2. Cleared for discharge from consultants involved: No PT/SW consult  3. ADLs back to baseline?  No  4. Activity and level of assistance: Up with maximum assistance.   5. Pain status: Improved-controlled with oral pain medications.  6. Barriers to discharge noted Yes PT/SW    Is patient a falls risk? Yes  Reason for falls risk:  Mobility  Falls armband on?  YES  Within Arm's Reach? Yes   Bed alarm turned on?   YES  Personal alarm in place and turned on?   No    Pt is oriented x4, VSS.  Patient denies hip pain at this time.  Up with one and a walker today with NST, but then PT was unable to get her up.  PT recommends a TCU, SW called granddaughter, Ok with TCU but would prefer current AL if the services needed are provided there. Waiting for a call back from nurse at [facility] for details.

## 2017-02-12 NOTE — ED NOTES
PRIMARY DIAGNOSIS: Acute Traumatic Pain  OUTPATIENT/OBSERVATION GOALS TO BE MET BEFORE DISCHARGE:    1. Tolerate PO Intake: Yes  2. Cleared for discharge from consultants involved: No PT/SW consult  3. ADLs back to baseline?  No  4. Activity and level of assistance: Up with Ax1 and omar steady this evening  5. Pain status: Improved-controlled with oral pain medications.  6. Barriers to discharge noted Yes PT/SW    Is patient a falls risk? Yes  Reason for falls risk:  Mobility  Falls armband on?  YES  Within Arm's Reach? Yes   Bed alarm turned on?   YES  Personal alarm in place and turned on?   No    A&O x4, VSS. Up with Ax1 and omar steady this evening. Denies hip pain, on scheduled Tylenol. PT to reassess pt tomorrow, currently rec TCU at d/c. SW following.

## 2017-02-12 NOTE — PLAN OF CARE
Problem: Goal Outcome Summary  Goal: Goal Outcome Summary  PT: Pt seen again this AM, demonstrating improved mobility as compared to previous day.  Pt still requiring Viola for sit to stand transfer to , taking several attempts at rocking forward before able to achieve stand, Viola with walker management during ambulation, demonstrates very short/shuffling steps and overall decreased confidence with mobility.  Pt recommending increased cares in current apartment living (will need assist to bathroom, assist to meals), WITH HHPT vs rehab in facility if available, or TCU if these services cannot be provided as pt does require assist with transfers and ambulation at this time.

## 2017-02-12 NOTE — PROGRESS NOTES
SWS:      D: Discharge planning.      I/A: DREW called [facility] and left a voice mail again for Nurse (Cordelia) [phone].     Met with Carolina (granddaughter). DREW and Carolina called [facility] together to see if they can pt back with increase services. Was not able to get a hold of nurse. Carolina will not be able to be here tomorrow so DREW will need to arrange transportation.       P: Pt will go back to [facility] if they are able to increase services. DREW will continue to follow. Will need to arrange HE transportation.

## 2017-02-12 NOTE — ED NOTES
PRIMARY DIAGNOSIS: Acute Traumatic Pain  OUTPATIENT/OBSERVATION GOALS TO BE MET BEFORE DISCHARGE:    1. Tolerate PO Intake: Yes  2. Cleared for discharge from consultants involved: No PT/SW consult  3. ADLs back to baseline?  No  4. Activity and level of assistance: Up with Alicia and omar steady   5. Pain status: Improved-controlled with oral pain medications.  6. Barriers to discharge noted Yes PT/SW    Is patient a falls risk? Yes  Reason for falls risk:  Mobility  Falls armband on?  YES  Within Arm's Reach? Yes    Bed alarm turned on?   YES  Personal alarm in place and turned on?   No    A&O x4, VSS. Up with Alicia and omar rico. Denies hip pain, on scheduled Tylenol. PT to reassess pt tomorrow, currently rec TCU at d/c. SW following.

## 2017-02-12 NOTE — DOWNTIME EVENT NOTE
The EMR was down for 4 hours on 2/12/2017.    Lizabeth Barrett was responsible for completing the paper charting during this time period.     The following information was re-entered into the system by Melly Padilla: N/A    The following information will remain in the paper chart: N/A    Melly Padilla  2/12/2017

## 2017-02-12 NOTE — PLAN OF CARE
Problem: Discharge Planning  Goal: Discharge Planning (Adult, OB, Behavioral, Peds)  PRIMARY DIAGNOSIS: Acute Traumatic Pain  OUTPATIENT/OBSERVATION GOALS TO BE MET BEFORE DISCHARGE:      1. Tolerate PO Intake: Yes  2. Cleared for discharge from consultants involved: No  3. ADLs back to baseline? No  4. Activity and level of assistance: Up with Ax1/W  5. Pain status: Improved-denies pain  6. Barriers to discharge noted: Yes pending PT/SW recommendations      Is patient a falls risk? Yes Reason for falls risk: Mobility  Falls armband on? YES,   Within Arm's Reach? Yes   Bed alarm turned on? No,   Personal alarm in place and turned on? No     Pt denies pain this am. Feeling a little confused upon awakening. Up with 1 and RW. SW and PT consults needed to determine if she goes back to AL with services or TCU

## 2017-02-12 NOTE — PLAN OF CARE
Problem: Goal Outcome Summary  Goal: Goal Outcome Summary  PT: Attempted to see pt, up in chair but appearing groggy and slightly confused, requests PT return later in AM when more alert.  PT will return later this AM.

## 2017-02-12 NOTE — PLAN OF CARE
Problem: Discharge Planning  Goal: Discharge Planning (Adult, OB, Behavioral, Peds)  PRIMARY DIAGNOSIS: Acute Traumatic Pain  OUTPATIENT/OBSERVATION GOALS TO BE MET BEFORE DISCHARGE:      1. Tolerate PO Intake: Yes  2. Cleared for discharge from consultants involved: No  3. ADLs back to baseline? No  4. Activity and level of assistance: Up with Ax1/W  5. Pain status: Improved-denies pain  6. Barriers to discharge noted: Yes pending PT/SW recommendations      Is patient a falls risk? Yes Reason for falls risk: Mobility  Falls armband on? YES,   Within Arm's Reach? Yes   Bed alarm turned on? No,   Personal alarm in place and turned on? No     Pt still denies pain. PT OK'd pt to go back to AL if there is increased cares to help with ambulation. Pt will stay again for placement until SW determines if those cares are provided at her assissted living.

## 2017-02-12 NOTE — PLAN OF CARE
Problem: Discharge Planning  Goal: Discharge Planning (Adult, OB, Behavioral, Peds)  Outcome: No Change  PRIMARY DIAGNOSIS: Acute Traumatic Pain  OUTPATIENT/OBSERVATION GOALS TO BE MET BEFORE DISCHARGE:     1. Tolerate PO Intake: Yes  2. Cleared for discharge from consultants involved: No  3. ADLs back to baseline?  No  4. Activity and level of assistance: Up with Ax1/W  5. Pain status: Improved-controlled with oral pain medications.  6. Barriers to discharge noted: Yes pending PT/SW recommendations     Is patient a falls risk? Yes  Reason for falls risk:  Mobility  Falls armband on?  YES,   Within Arm's Reach? Yes   Bed alarm turned on?   No,   Personal alarm in place and turned on?   No

## 2017-02-13 PROBLEM — M25.552 LEFT HIP PAIN: Status: RESOLVED | Noted: 2017-01-01 | Resolved: 2017-01-01

## 2017-02-13 NOTE — PROGRESS NOTES
Charan spoke with Miles at [facility] [phone]. Pt receives med set up, assist with showers/bath once a week and bed linen change. If pt discharging today would need to fax new home care orders to [phone] and any new meds same fax number.     Neisha Dalal RN   Care Coordinator  [phone]

## 2017-02-13 NOTE — PROGRESS NOTES
CM left message for Brigette PAZ at [facility] [phone] to check on pts current services and available services. Waiting for callback.    Neisha Dalal RN   Care Coordinator  [phone]

## 2017-02-13 NOTE — PLAN OF CARE
Problem: Goal Outcome Summary  Goal: Goal Outcome Summary     Physical Therapy Discharge Summary     Reason for therapy discharge:    Discharged to home with home therapy.     Progress towards therapy goal(s). See goals on Care Plan [facility] electronic health record for goal details.  Goals partially met.  Barriers to achieving goals:   discharge from facility.     Therapy recommendation(s):    Continued therapy is recommended.  Rationale/Recommendations:  home PT.

## 2017-02-13 NOTE — PLAN OF CARE
Problem: Discharge Planning  Goal: Discharge Planning (Adult, OB, Behavioral, Peds)  PRIMARY DIAGNOSIS: Acute Traumatic Pain  OUTPATIENT/OBSERVATION GOALS TO BE MET BEFORE DISCHARGE:      1. Tolerate PO Intake: Yes  2. Cleared for discharge from consultants involved: No  3. ADLs back to baseline? No  4. Activity and level of assistance: Up with Ax1/W  5. Pain status: Improved-denies pain  6. Barriers to discharge noted: Yes pending PT/SW recommendations      Is patient a falls risk? Yes Reason for falls risk: Mobility  Falls armband on? YES,   Within Arm's Reach? Yes   Bed alarm turned on? No,   Personal alarm in place and turned on? No      Pt still denies pain. PT OK'd pt to go back to AL if there is increased cares to help with ambulation. Pt will stay again for placement until SW determines if those cares are provided at her assisted living.

## 2017-02-13 NOTE — PLAN OF CARE
Problem: Discharge Planning  Goal: Discharge Planning (Adult, OB, Behavioral, Peds)  Outcome: Improving  PRIMARY DIAGNOSIS: Acute Traumatic Pain  OUTPATIENT/OBSERVATION GOALS TO BE MET BEFORE DISCHARGE:     1. Tolerate PO Intake: Yes  2. Cleared for discharge from consultants involved: Yes  3. ADLs back to baseline?  Per patient, uses walker at residence independently.  Now able to stand using minimal assist and walker.   Had complaints of dizziness, but states this is chronic for her.  Denies hip pain.  Pt. Had a hard time getting to standing position, from chair.  Required further time.  Able to ambulate to doorway and back with stand by assist for safety.  4. Activity and level of assistance: Up with standby assistance and walker.  5. Pain status: Pain free.  6. Barriers to discharge noted No     Is patient a falls risk? Yes  Reason for falls risk:  Mobility  Falls armband on?  YES,   Within Arm's Reach? Yes   Bed alarm turned on?   YES,   Personal alarm in place and turned on?   YES,

## 2017-02-13 NOTE — PROGRESS NOTES
HE transport at 1200, orders faxed to [facility] for home care and new meds. Left message with Miles at [phone] about pts return.    Neisha Dalal RN   Care Coordinator  [phone]

## 2017-02-13 NOTE — PLAN OF CARE
Problem: Discharge Planning  Goal: Discharge Planning (Adult, OB, Behavioral, Peds)  Outcome: Improving  Problem: Discharge Planning  Goal: Discharge Planning (Adult, OB, Behavioral, Peds)  PRIMARY DIAGNOSIS: Acute Traumatic Pain  OUTPATIENT/OBSERVATION GOALS TO BE MET BEFORE DISCHARGE:      1. Tolerate PO Intake: Yes  2. Cleared for discharge from consultants involved: No  3. ADLs back to baseline? No  4. Activity and level of assistance: Up with Ax1/W or Ax1/omar steady  5. Pain status: Improved-denies pain  6. Barriers to discharge noted: Yes pending PT/SW recommendations      Is patient a falls risk? Yes Reason for falls risk: Mobility  Falls armband on? YES,   Within Arm's Reach? Yes   Bed alarm turned on? No,   Personal alarm in place and turned on? No      Pt deenies pain. PT OK'd pt to go back to [facility] if there is increased cares to help with ambulation. Pt will stay again for placement until SW determines if those cares are provided at her assisted living.

## 2017-02-13 NOTE — PHARMACY-ANTICOAGULATION SERVICE
Clinical Pharmacy- Warfarin Discharge Note  This patient is currently on warfarin for the treatment of Atrial fibrillation.  INR Goal= 2-3  Expected length of therapy undetermined.    Anticoagulation Dose History     Recent Dosing and Labs Latest Ref Rng & Units 2/10/2017 2/11/2017 2/12/2017 2/13/2017    Warfarin 1.25 mg - - - 1.25 mg -    Warfarin 2.5 mg - 2.5 mg 2.5 mg - -    INR 0.86 - 1.14 1.95(H) 2.13(H) 2.78(H) 2.98(H)          Vitamin K doses administered during the last 7 days: ---  FFP administered during the last 7 days: ---    Reviewed PTA, discharge and inpt meds.  No significant med changes made to impact INR/warfarin dosing.  Agree w/plan to discharge pt on PTA warfarin dose.

## 2017-02-13 NOTE — PROGRESS NOTES
[facility]  Hospitalist Progress Note  Marisa Clarke PA-C 02/12/2017    Reason for Stay (Diagnosis): left hip pain, admitted for pain control and safe disposition         Assessment and Plan:         Chuyita Gayle is a 84 year old female with PMH significant for osteoarthritis, A-fib, HTN, GERD, asthma, and fibromyalgia who was admitted on 2/10/17 for uncontrolled left hip pain and weakness.        1. Left hip pain: secondary to severe OA. No trauma. Pain has improved on oral regiment and is back to baseline in terms of mobility. Continue on scheduled tylenol. Will D/c flexeril as pain has improved and has not had to use it. Note pt did have episodes of hypoxia with oxycodone. Have switched to Ultram but has not needed any meds beyond tylenol.     2. A-fib: INR therapeutic pharmacy to dose Warfarin  3. HTN: stable, continue Atenolol   4. Asthma: no recent exacerbations, continue Breo inhaler and PRN Albuterol inhaler   5. GERD: continue omeprazole    6. Anxiety: PTA PRN ativan available      DVT Prophylaxis: Ambulate every shift  Code Status: Full Code     Disposition:  Appreciate PT and SW assistance. Unable to discharge today as we were not able to reach nursing staff at AL to assess whether they are able to provide increased home services. Overall pt is improving and likely not need TCU but unable to confirm services from senior [facility]. SW to contact again tomorrow and ensure safe dispo and likely home tomorrow. Keep as OBS status for now.         Interval History (Subjective):      Overall feeling better. No pain after taking tylenol.   Up with PT today, moving better but not at baseline yet.   No other complaints.                   Physical Exam:      Last Vital Signs:  /64  Pulse 82  Temp 97.8  F (36.6  C) (Oral)  Resp 16  Wt 70.3 kg (155 lb)  SpO2 95%    No intake or output data in the 24 hours ending 02/12/17 2209    Constitutional: Awake, alert, cooperative, no apparent distress    Respiratory: Clear to auscultation bilaterally, no crackles or wheezing   Cardiovascular: Regular rate and rhythm, normal S1 and S2, and no murmur noted   Abdomen: Normal bowel sounds, soft, non-distended, non-tender   Skin: No rashes, no cyanosis, dry to touch   Neuro: Alert and oriented x3, no weakness, numbness, memory loss   Extremities: No edema, normal range of motion   Other(s):        All other systems: Negative          Medications:      All current medications were reviewed with changes reflected in problem list.         Data:      All new lab and imaging data was reviewed.   Labs:       Lab Results   Component Value Date     02/11/2017     02/10/2017    Lab Results   Component Value Date    CHLORIDE 105 02/11/2017    CHLORIDE 104 02/10/2017    Lab Results   Component Value Date    BUN 16 02/11/2017    BUN 13 02/10/2017      Lab Results   Component Value Date    POTASSIUM 4.1 02/11/2017    POTASSIUM 4.2 02/10/2017    Lab Results   Component Value Date    CO2 26 02/11/2017    CO2 26 02/10/2017    Lab Results   Component Value Date    CR 0.86 02/11/2017    CR 0.86 02/10/2017          Recent Labs  Lab 02/11/17  0615 02/10/17  1450   WBC 5.5 6.3   HGB 11.1* 12.9   HCT 34.0* 38.8   MCV 94 94    287      Imaging:   Results for orders placed or performed during the hospital encounter of 02/10/17   XR Pelvis and Hip Left 2 Views    Narrative    XR PELVIS AND HIP LEFT 2 VIEWS 2/10/2017 1:44 PM    HISTORY: Hip pain. Worse overnight.    COMPARISON: None.    FINDINGS: No fracture or malalignment. Mild symmetric osteoarthritis  at the hips characterized by some loss of joint space and small  marginal osteophytes. Sacroiliac joints are patent and symmetric.      Impression    IMPRESSION: Mild degenerative change. No acute abnormality.    TARYN ZAPIEN MD

## 2017-02-13 NOTE — DISCHARGE SUMMARY
[facility]  Discharge Summary        Chuyita Gayle MRN# [id number]   YOB: 1932 Age: 84 year old     Date of Admission:  2/10/2017  Date of Discharge:  2/13/2017  Admitting Physician:  Yomaira Rhodes, DO  Discharge Physician: Marisa Clarke PA-C  Discharging Service: Hospitalist      Primary Provider: Pallas, Kenneth G  Primary Care Physician Phone Number: [phone]         Primary Discharge Diagnoses:    Chuyita Gayle was admitted on 2/10/2017 for acute on chronic left hip pain.    1. Acute on chronic left hip pain 2/2 OA--improved with scheduled tylenol.         Secondary Discharge Diagnoses:     Past Medical History   Diagnosis Date     A-fib (H)      Anxiety      Fibromyalgia      Uncomplicated asthma             Code Status:      Full Code        Brief Hospital Summary:       Reason for your hospital stay      You were admitted for concerns of left hip pain. Xrays did not show any   evidence of fracture. Your pain is likely secondary to chronic arthritis.    You will get cough medication for   supportive measures. Make a follow up appt with PCP in 1 week.           Please refer to initial admission history and physical for further details.   Briefly, Chuyita Gayle was admitted on 2/10/2017 for acute on chronic left hip pain.  Initial work up in the ED did not reveal evidence of significant neurologic deficits concerning for cauda equina syndrome. No infectious or malignant process was identified. XRAY of the hip was negative.  Pt was registered to the Observation Unit for further evaluation and pain control.   Pt was placed on oral multi-modal pain regiment and was ambulated in the ham. \She was seen by PT and recommended increased services at [facility].  On the day of discharge, pt's pain improved and is able to perform basic ADLs. Safe discharge plan was identified and pt was discharged with po pain meds, education regarding home therapies and set up with  follow up care.         Significant Lab During Hospitalization:        Recent Labs  Lab 02/11/17  0615 02/10/17  1450   WBC 5.5 6.3   HGB 11.1* 12.9   HCT 34.0* 38.8   MCV 94 94    287       Recent Labs  Lab 02/11/17  0615 02/10/17  1450    139   POTASSIUM 4.1 4.2   CHLORIDE 105 104   CO2 26 26   ANIONGAP 7 9   * 106*   BUN 16 13   CR 0.86 0.86   GFRESTIMATED 62 63   GFRESTBLACK 75 76   TRACEE 8.9 9.1       Recent Labs  Lab 02/13/17  0600 02/12/17  0605 02/11/17  0615   INR 2.98* 2.78* 2.13*     No results for input(s): TSH in the last 168 hours.  No results for input(s): TROPONIN, TROPI, TROPR in the last 168 hours.    Invalid input(s): TROP, TROPONINIES  No results for input(s): COLOR, APPEARANCE, URINEGLC, URINEBILI, URINEKETONE, SG, UBLD, URINEPH, PROTEIN, UROBILINOGEN, NITRITE, LEUKEST, RBCU, WBCU in the last 168 hours.             Significant Imaging During Hospitalization:      Results for orders placed or performed during the hospital encounter of 02/10/17   XR Pelvis and Hip Left 2 Views    Narrative    XR PELVIS AND HIP LEFT 2 VIEWS 2/10/2017 1:44 PM    HISTORY: Hip pain. Worse overnight.    COMPARISON: None.    FINDINGS: No fracture or malalignment. Mild symmetric osteoarthritis  at the hips characterized by some loss of joint space and small  marginal osteophytes. Sacroiliac joints are patent and symmetric.      Impression    IMPRESSION: Mild degenerative change. No acute abnormality.    TARYN ZAPIEN MD              Pending Results:        Unresulted Labs Ordered in the Past 30 Days of this Admission     No orders found from 12/12/2016 to 2/11/2017.              Consultations This Hospital Stay:      Consultation during this admission received from PT         Discharge Instructions and Follow-Up:      Follow up with primary care provider, Kenneth G. Pallas, within 7 days   for hospital follow- up.  No follow up labs or test are needed.            Discharge Disposition:      Discharged  to home         Discharge Medications:        Current Discharge Medication List      START taking these medications    Details   acetaminophen (TYLENOL) 500 MG tablet Take 2 tablets (1,000 mg) by mouth 3 times daily  Qty: 100 tablet, Refills: 0    Associated Diagnoses: Hip pain, left      guaiFENesin-dextromethorphan (ROBITUSSIN DM) 100-10 MG/5ML syrup Take 5-10 mLs by mouth every 4 hours as needed for cough  Qty: 354 mL, Refills: 0    Associated Diagnoses: Cough      cetirizine-psuedoePHEDrine (ZYRTEC-D) 5-120 MG per 12 hr tablet Take 1 tablet by mouth every 12 hours as needed for allergies  Qty: 28 tablet, Refills: 0    Associated Diagnoses: Cough         CONTINUE these medications which have NOT CHANGED    Details   atenolol (TENORMIN) 25 MG tablet Take 25 mg by mouth 2 times daily      fluticasone-vilanterol (BREO ELLIPTA) 100-25 MCG/INH oral inhaler Inhale 1 puff into the lungs daily      Omega-3 Fatty Acids (FISH OIL) 1200 MG CAPS Take 1 capsule by mouth every morning      gabapentin (NEURONTIN) 300 MG capsule Take 600 mg by mouth At Bedtime      multivitamin, therapeutic (THERA-VIT) TABS tablet Take 1 tablet by mouth daily      omeprazole (PRILOSEC) 20 MG CR capsule Take 20 mg by mouth daily      cholecalciferol (VITAMIN D) 1000 UNIT tablet Take 1,000 Units by mouth daily      WARFARIN SODIUM PO 1.25 mg MWF and 2.5 mg ROW      albuterol (2.5 MG/3ML) 0.083% neb solution Take 1 vial by nebulization every 4 hours as needed for shortness of breath / dyspnea or wheezing      fluticasone (FLONASE) 50 MCG/ACT spray Spray 1 spray into both nostrils daily as needed for rhinitis or allergies      LORazepam (ATIVAN) 0.5 MG tablet Take 0.5 mg by mouth 2 times daily as needed for anxiety      albuterol (PROAIR HFA/PROVENTIL HFA/VENTOLIN HFA) 108 (90 BASE) MCG/ACT Inhaler Inhale 1 puff into the lungs every 4 hours as needed for shortness of breath / dyspnea or wheezing      senna-docusate (SENOKOT-S;PERICOLACE) 8.6-50 MG  per tablet Take 1 tablet by mouth 2 times daily as needed for constipation      valACYclovir (VALTREX) 500 MG tablet Take 500 mg by mouth 2 times daily as needed For 3 days prn      cetirizine (ZYRTEC) 10 MG tablet Take 10 mg by mouth daily as needed for allergies         STOP taking these medications       naproxen (NAPROSYN) 500 MG tablet Comments:   Reason for Stopping:                   Allergies:         Allergies   Allergen Reactions     Nabumetone Rash     Sulfa Drugs Rash     Vioxx [Rofecoxib] Rash     Celebrex [Celecoxib] Rash           Condition and Physical on Discharge:      Discharge condition: Stable   Vitals: Blood pressure 175/77, pulse 82, temperature 96  F (35.6  C), temperature source Oral, resp. rate 20, weight 70.3 kg (155 lb), SpO2 92 %.  155 lbs 0 oz      GENERAL:  Comfortable.  PSYCH: pleasant, oriented, No acute distress.  HEENT:  PERRLA. Normal conjunctiva, normal hearing, nasal mucosa and Oropharynx are normal.  NECK:  Supple, no neck vein distention, adenopathy or bruits, normal thyroid.  HEART:  Normal S1, S2 with no murmur, no pericardial rub, gallops or S3 or S4.  LUNGS:  Clear to auscultation, normal Respiratory effort. No wheezing, rales or ronchi.  ABDOMEN:  Soft, no hepatosplenomegaly, normal bowel sounds. Non-tender, non distended.   EXTREMITIES:  No pedal edema, +2 pulses bilateral and equal. Back symmetric, no curvature. ROM normal. No CVA tenderness. negative findings: no skin lesions, erythema, or scars, no tenderness to percussion or palpitation, no tenderness to palpation   SKIN:  Dry to touch, No rash, wound or ulcerations.  NEUROLOGIC:  CN 2-12 intact, BL 5/5 symmetric upper and lower extremity strength, sensation is intact with no focal deficits.

## 2017-02-13 NOTE — PLAN OF CARE
Problem: Discharge Planning  Goal: Discharge Planning (Adult, OB, Behavioral, Peds)  Problem: Discharge Planning  Goal: Discharge Planning (Adult, OB, Behavioral, Peds)  PRIMARY DIAGNOSIS: Acute Traumatic Pain  OUTPATIENT/OBSERVATION GOALS TO BE MET BEFORE DISCHARGE:      1. Tolerate PO Intake: Yes  2. Cleared for discharge from consultants involved: No  3. ADLs back to baseline? No  4. Activity and level of assistance: Up with Ax1/omar steady  5. Pain status: Improved-denies pain  6. Barriers to discharge noted: Yes pending PT/SW recommendations      Is patient a falls risk? Yes Reason for falls risk: Mobility  Falls armband on? YES,   Within Arm's Reach? Yes   Bed alarm turned on? No,   Personal alarm in place and turned on? No      Pt denies pain. PT OK'd pt to go back to [facility] if there is increased cares to help with ambulation. Pt will stay again for placement until SW determines if those cares are provided at her assisted living.

## 2017-02-14 PROBLEM — I48.91 ATRIAL FIBRILLATION WITH RVR (H): Status: ACTIVE | Noted: 2017-01-01

## 2017-02-14 NOTE — IP AVS SNAPSHOT
"     Chuyita Gayle #[id number] (CSN: [id number])  (84 year old F)  (Adm: 17)     [id number]               [facility] OBSERVATION DEPARTMENT: [phone]            Patient Demographics     Patient Name Sex          Age SSN Address Phone    Chuyita Gayle Female 1932 (84 year old)  [address] [address] [phone] (Home)      Emergency Contact(s)     Name Relation Home Work Mobile    HECTOR [phone]        Admission Information     Attending Provider Admitting Provider Admission Type Admission Date/Time    Ramo Townsend MD Parens, Karl R, MD Emergency 17  194    Discharge Date Hospital Service Auth/Cert Status Service Area     Observation Incomplete [facility]    Unit Room/Bed Admission Status        OBSERVATION DEPT  Admission (Confirmed)       Admission     Complaint    Atrial fibrillation with RVR (H)      Hospital Account     Name Acct ID Class Status Primary Coverage    Chuyita Gayle [id number] Observation Open None            Guarantor Account (for Hospital Account #[id number])     Name Relation to Pt Service Area Active? Acct Type    Chuyita Gayle Self FCS Yes Personal/Family    Address Phone          [address] [address] [phone](H)              Coverage Information (for Hospital Account #[id number])     Not on file                                                INTERAGENCY TRANSFER FORM - PHYSICIAN ORDERS   2017                       [facility] OBSERVATION DEPARTMENT: [phone]            Attending Provider: Ramo Townsend MD     Allergies:  Celecoxib, Nabumetone, Sulfa Drugs, Vioxx [Rofecoxib]    Infection:  None   Service:  Observation    Ht:  1.6 m (5' 3\")   Wt:  65.1 kg (143 lb 9.6 oz)   Admission Wt:  65.1 kg (143 lb 9.6 oz)    BMI:  25.44 kg/m 2   BSA:  1.7 m 2            ED Clinical Impression     Diagnosis " Description Comment Added By Time Added    Generalized weakness [R53.1] Generalized weakness [R53.1]  Sean Buckner MD 2/14/2017 10:01 PM    Atrial fibrillation with RVR (H) [I48.91] Atrial fibrillation with RVR (H) [I48.91]  Sean Buckner MD 2/14/2017 10:01 PM    Cough [R05] Cough [R05]  Sean Buckner MD 2/14/2017 10:01 PM    Asymptomatic bacteriuria [R82.71] Asymptomatic bacteriuria [R82.71]  Sean Buckner MD 2/14/2017 10:01 PM      Hospital Problems as of 2/15/2017              Priority Class Noted POA    Atrial fibrillation with RVR (H) Medium  2/14/2017 Yes      Non-Hospital Problems as of 2/15/2017     None      Code Status History     Date Active Date Inactive Code Status Order ID Comments User Context    2/15/2017 10:10 AM  Full Code [id number]  Sheridan Rhodes PA-C Outpatient    2/14/2017 11:43 PM 2/15/2017 10:10 AM Full Code [id number]  Ramo Townsend MD Inpatient      Current Code Status     Date Active Code Status Order ID Comments User Context       Prior      Summary of Visit     Reason for your hospital stay       A fib with rapid ventricular rate. HR improved and rhythm converted to sinus after 2 doses of IV diltiazem in the ED. You remained in sinus rhythm and home atenolol was increased.                Medication Review      CONTINUE these medications which may have CHANGED, or have new prescriptions. If we are uncertain of the size of tablets/capsules you have at home, strength may be listed as something that might have changed.        Dose / Directions Comments    atenolol 50 MG tablet   Commonly known as:  TENORMIN   This may have changed:    - medication strength  - how much to take  - when to take this  - Another medication with the same name was removed. Continue taking this medication, and follow the directions you see here.        Dose:  50 mg   Take 1 tablet (50 mg) by mouth 2 times daily   Quantity:  30 tablet   Refills:  1        gabapentin 300 MG capsule    Commonly known as:  NEURONTIN   This may have changed:  Another medication with the same name was removed. Continue taking this medication, and follow the directions you see here.        Dose:  600 mg   Take 600 mg by mouth At Bedtime   Refills:  0        LORazepam 0.5 MG tablet   Commonly known as:  ATIVAN   This may have changed:  Another medication with the same name was removed. Continue taking this medication, and follow the directions you see here.        Dose:  0.5 mg   Take 0.5 mg by mouth 2 times daily as needed for anxiety   Refills:  0        omeprazole 20 MG CR capsule   Commonly known as:  priLOSEC   This may have changed:  Another medication with the same name was removed. Continue taking this medication, and follow the directions you see here.        Dose:  20 mg   Take 20 mg by mouth daily   Refills:  0        warfarin 2.5 MG tablet   Commonly known as:  COUMADIN   This may have changed:  Another medication with the same name was removed. Continue taking this medication, and follow the directions you see here.        Take by mouth daily 2.5mg on Sun,Tues,Thurs,Sat, & 1.25mg on Mon,Wed,Fri   Refills:  0          CONTINUE these medications which have NOT CHANGED        Dose / Directions Comments    acetaminophen 500 MG tablet   Commonly known as:  TYLENOL        Dose:  1000 mg   Take 1,000 mg by mouth 2 times daily   Refills:  0        * albuterol 108 (90 BASE) MCG/ACT Inhaler   Commonly known as:  PROAIR HFA/PROVENTIL HFA/VENTOLIN HFA        Dose:  2 puff   Inhale 2 puffs into the lungs every 4 hours as needed   Refills:  0        * albuterol (2.5 MG/3ML) 0.083% neb solution        Dose:  1 vial   Take 1 vial by nebulization every 4 hours as needed for shortness of breath / dyspnea or wheezing   Refills:  0        BREO ELLIPTA 100-25 MCG/INH oral inhaler   Generic drug:  fluticasone-vilanterol        Dose:  1 puff   Inhale 1 puff into the lungs daily   Refills:  0        Fish Oil 1200 MG Caps         Dose:  1 capsule   Take 1 capsule by mouth daily   Refills:  0        fluticasone 50 MCG/ACT spray   Commonly known as:  FLONASE        Dose:  1 spray   Spray 1 spray into both nostrils daily as needed for rhinitis or allergies   Refills:  0        guaiFENesin-dextromethorphan 100-10 MG/5ML syrup   Commonly known as:  ROBITUSSIN DM        Dose:  5 mL   Take 5 mLs by mouth every 4 hours as needed for cough   Refills:  0        MULTIVITAMIN ADULT PO        Dose:  1 tablet   Take 1 tablet by mouth daily   Refills:  0        naproxen 500 MG tablet   Commonly known as:  NAPROSYN        Dose:  500 mg   Take 500 mg by mouth 2 times daily as needed for moderate pain   Refills:  0        senna-docusate 8.6-50 MG per tablet   Commonly known as:  SENOKOT-S;PERICOLACE        Dose:  1 tablet   Take 1 tablet by mouth 2 times daily as needed for constipation   Refills:  0        VALTREX 500 MG tablet   Generic drug:  valACYclovir        Dose:  500 mg   Take 500 mg by mouth 2 times daily as needed (x3 days for cold sores)   Refills:  0        Vitamin D (Cholecalciferol) 1000 UNITS Caps        Dose:  1000 Units   Take 1,000 Units by mouth daily   Refills:  0        * Notice:  This list has 2 medication(s) that are the same as other medications prescribed for you. Read the directions carefully, and ask your doctor or other care provider to review them with you.      STOP taking     cetirizine-psuedoePHEDrine 5-120 MG per 12 hr tablet   Commonly known as:  zyrTEC-D           fluticasone 110 MCG/ACT Inhaler   Commonly known as:  FLOVENT HFA                   After Care     Activity       Your activity upon discharge: activity as tolerated       Diet       Follow this diet upon discharge: Regular             Follow-Up Appointment Instructions     Follow-up and recommended labs and tests        Follow up with primary care provider, Kenneth G. Pallas, within 7 days for hospital follow- up.  No follow up labs or test are needed.  Increase  "home atenolol to 50 mg twice daily  Zio Patch on discharge  Resume home cares             Statement of Approval     Ordered          02/15/17 1011  I have reviewed and agree with all the recommendations and orders detailed in this document.  EFFECTIVE NOW     Approved and electronically signed by:  Sheridan Rhodes PA-C                                                 INTERAGENCY TRANSFER FORM - NURSING   2/14/2017                       [facility] OBSERVATION DEPARTMENT: [phone]            Attending Provider: Ramo Townsend MD     Allergies:  Celecoxib, Nabumetone, Sulfa Drugs, Vioxx [Rofecoxib]    Infection:  None   Service:  Observation    Ht:  1.6 m (5' 3\")   Wt:  65.1 kg (143 lb 9.6 oz)   Admission Wt:  65.1 kg (143 lb 9.6 oz)    BMI:  25.44 kg/m 2   BSA:  1.7 m 2            Advance Directives        Does patient have a scanned Advance Directive/ACP document in EPIC?           Yes        Immunizations     None      ASSESSMENT     Discharge Profile Flowsheet     DISCHARGE NEEDS ASSESSMENT     Patient's communication style  spoken language (English or Bilingual) 02/14/17 1953    Patient/family verbalizes understanding of discharge plan recommendations?  Yes 02/15/17 0928   SKIN      Readmission Within The Last 30 Days  current reason for admission unrelated to previous admission 02/15/17 0928   Inspection  Partial (identify areas NOT inspected) 02/15/17 0917    Anticipated Changes Related to Illness  inability to care for self 02/15/17 0928   Skin areas NOT inspected  Buttock, left;Buttock, right;Sacrum;Coccyx 02/15/17 0917    # of Referrals Placed by CTS  Transportation 02/15/17 1042   Skin WDL  WDL 02/15/17 0917    GASTROINTESTINAL (ADULT,PEDIATRIC,OB)     SAFETY      GI WDL  WDL 02/15/17 0917   Safety WDL  ex 02/15/17 0917    COMMUNICATION ASSESSMENT                        Assessment WDL (Within Defined Limits) Definitions           Safety WDL     Effective: 09/28/15    Row Information: " "<b>WDL Definition:</b> Bed in low position, wheels locked; call light in reach; upper side rails up x 2; ID band on<br> <font color=\"gray\"><i>Item=AS safety wdl>>List=AS safety wdl>>Version=F14</i></font>      Skin WDL     Effective: 09/28/15    Row Information: <b>WDL Definition:</b> Warm; dry; intact; elastic; without discoloration; pressure points without redness<br> <font color=\"gray\"><i>Item=AS skin wdl>>List=AS skin wdl>>Version=F14</i></font>      Vitals     Vital Signs Flowsheet     VITAL SIGNS     Pain Intervention(s)  Medication (See eMAR) 02/15/17 0753    Temp  96.8  F (36  C) 02/15/17 0753   Response to Interventions  Absence of nonverbal indicators of pain 02/15/17 0654    Temp src  Oral 02/15/17 0753   ANALGESIA SIDE EFFECTS MONITORING      Resp  16 02/15/17 0753   Side Effects Monitoring: Respiratory Quality  R 02/15/17 0654    Pulse  129 02/14/17 1954   Side Effects Monitoring: Respiratory Depth  N 02/15/17 0654    Heart Rate  72 02/15/17 0753   Side Effects Monitoring: Sedation Level  S 02/15/17 0654    Pulse/Heart Rate Source  Monitor 02/15/17 0753   HEIGHT AND WEIGHT      BP  163/59 02/15/17 0753   Height  1.6 m (5' 3\") 02/15/17 0103    OXYGEN THERAPY     Weight  65.1 kg (143 lb 9.6 oz) 02/15/17 0103    SpO2  94 % 02/15/17 0753   BSA (Calculated - sq m)  1.7 02/15/17 0103    O2 Device  None (Room air) 02/15/17 0753   BMI (Calculated)  25.49 02/15/17 0103    PAIN/COMFORT     SCOTT COMA SCALE      Patient Currently in Pain  yes 02/15/17 0753   Best Eye Response  4-->(E4) spontaneous 02/15/17 0917    Preferred Pain Scale  number (Numeric Rating Pain Scale) 02/15/17 0002   Best Motor Response  6-->(M6) obeys commands 02/15/17 0917    Patient's Stated Pain Goal  No pain 02/15/17 0753   Best Verbal Response  5-->(V5) oriented 02/15/17 0917    0-10 Pain Scale  4 02/15/17 0753   Scott Coma Scale Score  15 02/15/17 0917    Pain Location  Back 02/15/17 0753   DAILY CARE      Pain Orientation  Posterior " 02/15/17 0002   Activity Type  other (see comments) (Ax1/omar steady) 02/15/17 0220    Pain Descriptors  Aching 02/15/17 0002   Activity Level of Assistance  assistance, 1 person (with omar rico) 02/15/17 0220            Patient Lines/Drains/Airways Status    Active LINES/DRAINS/AIRWAYS     Name: Placement date: Placement time: Site: Days: Last dressing change:    Peripheral IV 02/14/17 Right Hand 02/14/17 1956   Hand   less than 1             Patient Lines/Drains/Airways Status    Active PICC/CVC     None            Intake/Output Detail Report     None      Last Void/BM       Most Recent Value    Urine Occurrence 1 at 02/15/2017 1034    Stool Occurrence 1 at 02/15/2017 1034      Case Management/Discharge Planning     Case Management/Discharge Planning Flowsheet     REFERRAL INFORMATION     Anticipated Changes Related to Illness  inability to care for self 02/15/17 0928    # of Referrals Placed by CTS  Transportation 02/15/17 1042   ABUSE RISK SCREEN       Assigned to Case  Neisha PAZ 02/15/17 1042   QUESTION TO PATIENT:  Has a member of your family or a partner(now or in the past) intimidated, hurt, manipulated, or controlled you in any way?  no 02/14/17 1955    DISCHARGE PLANNING     QUESTION TO PATIENT: Do you feel safe going back to the place where you are living?  yes 02/14/17 1955    Patient/family verbalizes understanding of discharge plan recommendations?  Yes 02/15/17 0928   OBSERVATION: Is there reason to believe there has been maltreatment of a vulnerable adult (ie. Physical/Sexual/Emotional abuse, self neglect, lack of adequate food, shelter, medical care, or financial exploitation)?  no 02/14/17 1955    Readmission Within The Last 30 Days  current reason for admission unrelated to previous admission 02/15/17 0928                       [facility] OBSERVATION DEPARTMENT: [phone]            Medication Administration Report for Chuyita Gayle as of 02/15/17 1053   Legend:  "   Given Hold Not Given Due Canceled Entry Other Actions    Time Time (Time) Time  Time-Action       Inactive    Active    Linked        Medications 02/09/17 02/10/17 02/11/17 02/12/17 02/13/17 02/14/17 02/15/17    albuterol neb solution 2.5 mg  Dose: 2.5 mg Freq: EVERY 4 HOURS PRN Route: NEBULIZATION  PRN Reason: wheezing  Start: 02/15/17 0105              atenolol (TENORMIN) tablet 50 mg  Dose: 50 mg Freq: 2 TIMES DAILY Route: PO  Start: 02/15/17 0800          0757 (50 mg)-Given       [ ] 2000           fluticasone (FLOVENT HFA) 110 MCG/ACT Inhaler 1 puff  Dose: 1 puff Freq: 2 TIMES DAILY Route: IN  Start: 02/14/17 2344   Admin Instructions: Rinse mouth after use.          (2200)-Not Given [C]        (0756)-Not Given [C]       [ ] 2000           lidocaine (LMX4) kit  Freq: EVERY 1 HOUR PRN Route: Top  PRN Reason: pain  PRN Comment: with VAD insertion or accessing implanted port.  Start: 02/14/17 2343   Admin Instructions: Do NOT give if patient has a history of allergy to any local anesthetic or any \"bud\" product.  Apply 30 minutes prior to VAD insertion or port access. MAX Dose: 2.5 g (  of 5 g tube).               lidocaine 1 % 1 mL  Dose: 1 mL Freq: EVERY 1 HOUR PRN Route: OTHER  PRN Comment: mild pain with VAD insertion or accessing implanted port  Start: 02/14/17 2343   Admin Instructions: Do NOT give if patient has a history of allergy to any local anesthetic or any \"bud\" product. MAX dose 1 mL subcutaneous OR intradermal in divided doses.               naloxone (NARCAN) injection 0.1-0.4 mg  Dose: 0.1-0.4 mg Freq: EVERY 2 MIN PRN Route: IV  PRN Reason: opioid reversal  Start: 02/14/17 2343   Admin Instructions: For respiratory rate LESS than or EQUAL to 8.  Partial reversal dose:  0.1 mg titrated q 2 minutes for Analgesia Side Effects Monitoring Sedation Level of 3 (frequently drowsy, arousable, drifts to sleep during conversation).Full reversal dose:  0.4 mg bolus for Analgesia Side Effects Monitoring " Sedation Level of 4 (somnolent, minimal or no response to stimulation).               ondansetron (ZOFRAN-ODT) ODT tab 4 mg  Dose: 4 mg Freq: EVERY 6 HOURS PRN Route: PO  PRN Reason: nausea  Start: 02/14/17 2343   Admin Instructions: This is Step 1 of nausea and vomiting management.  If nausea not resolved in 15 minutes, go to Step 2 prochlorperazine (COMPAZINE). Do not push through foil backing. Peel back foil and gently remove. Place on tongue immediately. Administration with liquid unnecessary              Or  ondansetron (ZOFRAN) injection 4 mg  Dose: 4 mg Freq: EVERY 6 HOURS PRN Route: IV  PRN Reasons: nausea,vomiting  Start: 02/14/17 2343   Admin Instructions: This is Step 1 of nausea and vomiting management.  If nausea not resolved in 15 minutes, go to Step 2 prochlorperazine (COMPAZINE).               sodium chloride (PF) 0.9% PF flush 3 mL  Dose: 3 mL Freq: EVERY 8 HOURS Route: IK  Start: 02/14/17 2344   Admin Instructions: And Q1H PRN, to lock peripheral IV dormant line.           0003 (3 mL)-Given       0758 (3 mL)-Given       [ ] 1544       [ ] 2344           sodium chloride (PF) 0.9% PF flush 3 mL  Dose: 3 mL Freq: EVERY 1 HOUR PRN Route: IK  PRN Reason: line flush  Start: 02/14/17 2343   Admin Instructions: for peripheral IV flush post IV meds              Completed Medications  Medications 02/09/17 02/10/17 02/11/17 02/12/17 02/13/17 02/14/17 02/15/17         Dose: 500 mL Freq: ONCE Route: IV  Last Dose: Stopped (02/14/17 2213)  Start: 02/14/17 2006   End: 02/14/17 2213 2053 (500 mL)-New Bag       2213-Stopped              Dose: 1,000 mg Freq: ONCE Route: PO  Start: 02/14/17 2214   End: 02/14/17 2214   Admin Instructions: Maximum acetaminophen dose from all sources = 75 mg/kg/day not to exceed 4 gram          2214 (1,000 mg)-Given              Dose: 10 mg Freq: ONCE Route: IV  Start: 02/14/17 2223   End: 02/14/17 2224 2224 (10 mg)-Given              Dose: 10 mg Freq: ONCE Route:  IV  Start: 02/14/17 2201   End: 02/14/17 2209 2209 (10 mg)-Given           Discontinued Medications  Medications 02/09/17 02/10/17 02/11/17 02/12/17 02/13/17 02/14/17 02/15/17         Rate: 125 mL/hr Freq: CONTINUOUS Route: IV  Start: 02/14/17 2006   End: 02/14/17 2343   Admin Instructions: Administer after the bolus.                 2343-Med Discontinued          Dose: 2 puff Freq: EVERY 6 HOURS Route: IN  Start: 02/14/17 2344   End: 02/15/17 0106                 0106-Med Discontinued         Dose: 25 mg Freq: 2 TIMES DAILY Route: PO  Start: 02/14/17 2344   End: 02/15/17 0021          0006 (25 mg)-Given       0021-Med Discontinued                INTERAGENCY TRANSFER FORM - NOTES (H&P, Discharge Summary, Consults, Procedures, Therapies)   2/14/2017                       [facility] OBSERVATION DEPARTMENT: [phone]               History & Physicals      H&P by Ramo Townsend MD at 2/14/2017 11:09 PM     Author:  Ramo Townsend MD Service:  Hospitalist Author Type:  Physician    Filed:  2/14/2017 11:10 PM Date of Service:  2/14/2017 11:09 PM Note Created:  2/14/2017 11:09 PM    Status:  Signed :  Ramo Townsend MD (Physician)         Please note that pt identifier is mistaken. Her correct MR #  [id number].[KP1.1]     Revision History        User Key Date/Time User Provider Type Action    > KP1.1 2/14/2017 11:10 PM Ramo Townsend MD Physician Sign                     Discharge Summaries      Discharge Summaries by Sheridan Rhodes PA-C at 2/15/2017 10:11 AM     Author:  Sheridan Rhodes PA-C Service:  Internal Medicine Author Type:  Physician Assistant - C    Filed:  2/15/2017 10:48 AM Date of Service:  2/15/2017 10:11 AM Note Created:  2/15/2017 10:11 AM    Status:  Cosign Needed :  Sheridan Rhodes PA-C (Physician Assistant - C)    Cosign Required:  Yes             [facility] Outpatient / Observation Unit  Discharge Summary        Chuyita Gayle MRN# [id number]   Date of  Birth: 7/24/1932 Age: 84 year old     Date of Admission:  2/14/2017  Date of Discharge:  2/15/2017  Admitting Physician:  Ramo Townsend MD  Discharge Physician: Sheridan Rhodes PA-C  Discharging Service: Hospitalist      Primary Provider: Pallas, Kenneth G  Primary Care Physician Phone Number: [phone]         Primary Discharge Diagnoses:    Chuyita Gayle was admitted on 2/14/2017 for concerns of generalized weakness and found to be in a fib with RVR.     1. A fib with RVR - hx of PAF, on coumadin[AK1.1]. Presented to ED with generalized weakness, noted to be in a fib with RVR, HRs 130s. HR improved and rhythm converted to SR after IV diltiazem x2 in ED. Home atenolol was increased to 50 mg BID and rhythm remained in SR overnight. Weakness improved. Will discharge with Zio Patch to further assess heart rate control. Follow up with PCP for further recommendations. Continue coumadin. Recommend avoiding decongestants at home[AK1.2]  2. Generalized weakness[AK1.1] - likely due to a fib with RVR and recent hospitalization for hip pain. Discharged home on 2/13 with Home Nurse/PT/OT services. Ok to return home with continued home services.[AK1.2]           Secondary Discharge Diagnoses:[AK1.1]   PAF  HTN[AK1.2]           Code Status:[AK1.1]      DNR / DNI[AK1.2]        Brief Hospital Summary:        Reason for your hospital stay       A fib with rapid ventricular rate. HR improved and rhythm converted to   sinus after 2 doses of IV diltiazem in the ED. You remained in sinus   rhythm and home atenolol was increased.                    Please refer to initial admission history and physical for further details.   Briefly, Chuyita Gayle was admitted on 2/14/2017 for concerns of[AK1.1] generalized weakness and found to be in a fib with RVR[AK1.2].   Initial work up in the ED did not reveal evidence of STEMI or findings consistent with unstable angina or acute coronary ischemia.[AK1.1] Rhythm converted to sinus  rhythm after IV diltiazem x2 in the ED.[AK1.2] Pt was registered to the Observation Unit for further evaluation.   Pt[AK1.1] was monitored on tele and pt remained in SR overnight. Denies chest pain. Strength improved overnight and was able to ambulate with walker, she was slow but steady[AK1.2]. Labs were reviewed and significant results addressed. On the day of discharge, pt was pain free[AK1.1] and had[AK1.2] no[AK1.1] other[AK1.2] complaints. Medications wer[AK1.1]e[AK1.2] reviewed and adjustments made as necessary. Pt is instructed to follow up as below.             Significant Lab During Hospitalization:[AK1.1]        Recent Labs  Lab 02/14/17 2031   WBC 7.4   HGB 12.9   HCT 39.8   MCV 94          Recent Labs  Lab 02/14/17 2031      POTASSIUM 3.9   CHLORIDE 105   CO2 23   ANIONGAP 12   *   BUN 12   CR 0.77   GFRESTIMATED 71   GFRESTBLACK 86   TRACEE 9.0   PROTTOTAL 7.4   ALBUMIN 3.6   BILITOTAL 0.4   ALKPHOS 79   AST 36   ALT 28       Recent Labs  Lab 02/14/17 2031   INR 2.29*       Recent Labs  Lab 02/14/17 2031   LACT 1.1       Recent Labs  Lab 02/14/17 2031   TROPI <0.015The 99th percentile for upper reference range is 0.045 ug/L.  Troponin values in the range of 0.045 - 0.120 ug/L may be associated with risks of adverse clinical events.       Recent Labs  Lab 02/14/17 2054   COLOR Light Yellow   APPEARANCE Clear   URINEGLC Negative   URINEBILI Negative   URINEKETONE 5*   SG 1.003   UBLD Negative   URINEPH 7.0   PROTEIN Negative   NITRITE Negative   LEUKEST Large*   RBCU 2   WBCU 35*[AK1.3]                Significant Imaging During Hospitalization:[AK1.1]      Recent Results (from the past 48 hour(s))   XR Chest 2 Views    Narrative    CHEST TWO VIEWS   2/14/2017 9:28 PM     HISTORY: Fever    COMPARISON: None.      Impression    IMPRESSION: No acute pulmonary disease. Borderline cardiomegaly. No  pulmonary edema.    CINTHYA CALDERÓN MD[AK1.3]              Pending Results:        Unresulted  Labs Ordered in the Past 30 Days of this Admission     Date and Time Order Name Status Description    2/14/2017 2014 Blood culture Preliminary     2/14/2017 2014 Blood culture Preliminary     2/14/2017 2005 Urine Culture Aerobic Bacterial Preliminary               Consultations This Hospital Stay:[AK1.1]      No consultations were requested during this admission[AK1.2]         Discharge Instructions and Follow-Up:      Follow-up Appointments     Follow-up and recommended labs and tests        Follow up with primary care provider, Kenneth G. Pallas, within 7 days   for hospital follow- up.  No follow up labs or test are needed.  Increase home atenolol to 50 mg twice daily  Zio Patch on discharge  Resume home cares                  Pt instructed to follow up with PCP in 7 days and with Consultant if indicated.   Follow-up Labs[AK1.1] None[AK1.2]        Discharge Disposition:      Discharged to home         Discharge Medications:        Current Discharge Medication List      CONTINUE these medications which have CHANGED    Details   atenolol (TENORMIN) 50 MG tablet Take 1 tablet (50 mg) by mouth 2 times daily  Qty: 30 tablet, Refills: 1    Associated Diagnoses: Atrial fibrillation with RVR (H)         CONTINUE these medications which have NOT CHANGED    Details   fluticasone (FLOVENT HFA) 110 MCG/ACT Inhaler Inhale 1 puff into the lungs 2 times daily      GABAPENTIN PO Take 600 mg by mouth      OMEPRAZOLE PO       WARFARIN SODIUM PO       albuterol (PROAIR HFA/PROVENTIL HFA/VENTOLIN HFA) 108 (90 BASE) MCG/ACT Inhaler Inhale 2 puffs into the lungs every 6 hours      LORazepam (ATIVAN PO)                  Allergies:         Allergies   Allergen Reactions     Celecoxib      Nabumetone      Sulfa Drugs      Vioxx [Rofecoxib]            Condition and Physical on Discharge:      Discharge condition: Stable   Vitals: Blood pressure 163/59, pulse 129, temperature 96.8  F (36  C), temperature source Oral, resp. rate 16, height  "1.6 m (5' 3\"), weight 65.1 kg (143 lb 9.6 oz), SpO2 94 %.  143 lbs 9.6 oz      GENERAL:  Comfortable.  PSYCH: pleasant, oriented, No acute distress.  HEART:  Normal S1, S2 with no murmur, no pericardial rub, gallops or S3 or S4.  LUNGS:  Clear to auscultation, normal Respiratory effort. No wheezing, rales or ronchi.  EXTREMITIES:[AK1.1]  Able to ambulate in hallway with walker[AK1.2].  SKIN:  Dry to touch, No[AK1.1] obvious[AK1.2] rash, wound or ulcerations.  NEUROLOGIC:[AK1.1]  Grossly intact[AK1.2]     Sheridan Rhodes PA-C[AK1.1]     Revision History        User Key Date/Time User Provider Type Action    > AK1.3 2/15/2017 10:48 AM Sheridan Rhodes PA-C Physician Assistant - HERMINIO Sign     AK1.2 2/15/2017 10:39 AM Sheridan Rhodes PA-C Physician Assistant - C      AK1.1 2/15/2017 10:11 AM Sheridan Rhodes PA-C Physician Assistant - C                   Consult Notes     No notes of this type exist for this encounter.         Progress Notes - Physician (Notes for yesterday and today)      ED Provider Notes by Sean Buckner MD at 2/14/2017  7:46 PM     Author:  Sean Buckner MD Service:  Emergency Medicine Author Type:  Physician    Filed:  2/15/2017  8:38 AM Date of Service:  2/14/2017  7:46 PM Note Created:  2/14/2017  8:42 PM    Status:  Addendum :  Sean Buckner MD (Physician)           History     Chief Complaint:  Generalized Weakness    HPI[ZJ1.1]   History somewhat limited as patient is a poor historian.[ZJ1.2]    Chuyita Gayle is a[ZJ1.1]n[BR1.1] 84 year old female who presents with generalized weakness. The patient has a history significant for a-fib and is currently on Coumadin. She was recently seen here in the hospital on 2/10 until 2/13 for left hip pain and generalized weakness as well as bilateral leg weakness, and was subsequently admitted for several days until yesterday as she was unable to transfer or move without assistance. It was attributed to severe " osteoarthritis[ZJ1.1].  S[BR1.1]he was sta[ZJ1.1]r[ZJ1.2]david on Robitussin DM and Zyrtec[ZJ1.1] D[ZJ1.2], and halted her Naproxen. Her hip pain mostly resolved but she has continued to feel generally weak and has had difficulty walking around her assisted living residence. Despite resolution of pain, she has continued to have weakness to the point where she feels that she cannot get out of bed and subsequently called EMS to be brought here for evaluation. Here, she notes that she has had a dry cough for several weeks, with some blood tinged mucous noted during the mornings only. She has otherwise received her flu vaccination and denies any chest pain, abdominal pain, vomiting, diarrhea, or constipation. She complains of generalized weakness and difficulty ambulating at home. She has not had any measured fevers or chills.    Allergies:[ZJ1.1]  Celecoxib  Nabumetone  Sulfa Drugs  Vioxx [Rofecoxib][ZJ1.3]      Medications:    Atenolol  Flovent  Gabapentin  Omeprazole  Warfarin  Ativan    Past Medical History:    A-fib  Hip pain[ZJ1.1]  Osteoarthritis[ZJ1.2]     Past Surgical History:    History reviewed. No pertinent surgical history.     Family History:    History reviewed. No pertinent family history.      Social History:  Patient presents alone via EMS  Marital Status:        Review of Systems   Constitutional: Positive for[ZJ1.1] fatigue[ZJ1.2]. Negative for[ZJ1.1] chills[ZJ1.2] and[ZJ1.1] fever[ZJ1.2].   Respiratory: Positive for[ZJ1.1] cough[ZJ1.2]. Negative for[ZJ1.1] shortness of breath[ZJ1.2].    Cardiovascular: Negative for[ZJ1.1] chest pain[ZJ1.2].   Gastrointestinal: Negative for[ZJ1.1] nausea[ZJ1.2] and[ZJ1.1] vomiting[ZJ1.2].   Musculoskeletal: Positive for[ZJ1.1] arthralgias[ZJ1.2].   Neurological: Positive for[ZJ1.1] weakness[ZJ1.2]. Negative for[ZJ1.1] light-headedness[ZJ1.2],[ZJ1.1] numbness[ZJ1.2] and[ZJ1.1] headaches[ZJ1.2].[ZJ1.1]   All other systems reviewed and are  negative[ZJ1.2].      Physical Exam[ZJ1.1]     Patient Vitals for the past 24 hrs:   BP Temp Temp src Pulse Heart Rate Resp SpO2   02/14/17 2200 - - - - 128 - 93 %   02/14/17 2145 - - - - 145 - 94 %   02/14/17 2130 - - - - 133 - 93 %   02/14/17 2100 (!) 159/106 - - - - - 96 %   02/14/17 2045 144/80 - - - - - -   02/14/17 2030 (!) 138/92 - - - - - 96 %   02/14/17 2015 169/90 - - - - - 96 %   02/14/17 2000 (!) 167/98 - - - - - 97 %   02/14/17 1953 (!) 177/108 97.9  F (36.6  C) Oral 129 - 16 96 %[ZJ1.4]      Physical Exam[ZJ1.1]  General:   Well-nourished   Speaking in full sentences   Answers questions appropriately  Eyes:   Conjunctiva without injection or scleral icterus   PERRL  ENT:   Moist mucous membranes   Posterior oropharynx clear without erythema or exudate   Nares patent   Pinnae normal  Neck:   Full ROM   No stiffness appreciated  Resp:[ZJ1.2]   Diminished[BR1.1] at bases R>L   No crackles, wheezing or audible rubs   Good air movement  CV:    Irregularly irregular rate and rhythm   S1 and S2 present   No murmur, gallop or rub  GI:   BS present   Abdomen soft without distention   Non-tender to light and deep palpation   No guarding or rebound tenderness  Skin:   Warm, dry, well perfused   No rashes or open wounds on exposed skin  MSK:   Moves all extremities   No focal deformities   Trace LE edema   Able to log roll BLE  Neuro:   Alert   Answers questions appropriately   Moves all extremities equally  Psych:   Normal affect, normal mood[ZJ1.2]    Emergency Department Course     ECG (20:29:54):  Rate 132 bpm. IA interval *. QRS duration 86. QT/QTc 336/497. P-R-T axes * 26 -40. Atrial fibrillation with RVR. ST depression, consider subendocardial injury. Abnormal QRS-T angle, consider primary T wave abnormality. Abnormal ECG. Interpreted at 2013 by Sean Buckner MD.     Imaging:[ZJ1.1]  Radiographic findings were communicated with the patient who voiced understanding of the findings.    X-ray Chest, 2  views:  No acute pulmonary disease. Borderline cardiomegaly. No  pulmonary edema.  Result per radiology.[ZJ1.2]     Laboratory:[ZJ1.1]  Troponin: <0.015   CBC: WNL (WBC 7.4, HGB 12.9, )    CMP: Glucose 107 (H), o/w WNL (Creatinine 0.77)   PTT: 52 (H)    INR: 2.29 (H)  Lactate: 1.1    UA: leukocyte esterase large, WBC 35 (H), Ketone 5, Bacteria few, Squamous Epithelial 5 (H), o/w negative  Blood Culture x2: Pending   Urine culture: Pending[ZJ1.2]    Interventions:[ZJ1.1]  2053 - NS 1L IV Bolus   2209 - Cardizem 10 mg IV  2214 - Tylenol 1,000 mg PO  2224 - Cardizem 10 mg IV[ZJ1.2]    Emergency Department Course:[ZJ1.1]  The patient arrived in the emergency department via EMS.   Past medical records, nursing notes, and vitals reviewed.  0842: I performed an exam of the patient and obtained history, as documented above.    IV inserted and blood drawn. The patient was placed on continuous cardiac monitoring and pulse oximetry.   The patient was sent for a x-ray while in the emergency department, findings above.     2228: Findings and plan explained to the Patient who consents to admission.     2231: Discussed the patient with Dr. Townsend, who will admit the patient to a medical bed for further monitoring, evaluation, and treatment.[ZJ1.2]      2321: Patient converted into normal sinus rhythm here. On recheck she reports feeling much better.[ZJ1.5]    Impression & Plan      Medical Decision Making:[ZJ1.1]  Chuyita Gayle is a[ZJ1.2]n[BR1.1] 84 year old female with a past medical history significant for osteoarthritis, atrial fibrillation, and chronic left hip pain who is presenting to the ED for evaluation of generalized weakness. History is limited from the patient as she does exhibit some confusion. Prior records were reviewed including hospitalization from 2/10 to 2/13 for left hip pain. Please refer to associated documentation in her other MR[ZJ1.2]N[BR1.1] for additional information. Here in the ED, the  patient reports ongoing generalized weakness. At this point, the exact cause of the patient s weakness is not entirely clear. It is possible that this may be related to deconditioning in light of recent hospital stay. By history, she reports cough with associated green sputum, which raises concern for associated bronchitis. No focal findings of acute infectious process is identified on chest X-ray. Laboratory studies reveal normal WBC and lactate of 1.1. Metabolic function is grossly unremarkable. INR is therapeutic at 2.29. Urinalysis reveals suggesti[ZJ1.2]on of[BR1.1] infection with large leukocyte esterase and 35 WBC, but there is also contamination with squamous cells and the patient notes no urinary symptoms; at this point we will hold on antibiotics as this is most consistent with asymptomatic bacteriuria. Urine and blood cultures were obtained and are presently pending. The patient currently reports no left hip pain. I am able to log roll her leg and I have low suspicion for concurrent septic arthritis.[ZJ1.2]  EKG reveals atrial fibrillation with RVR[BR1.1] as well as some subtle STD (most likely rate related)[BR1.2].[BR1.1]  She notes no associated chest pain nor chest pressure.[BR1.2]  After 2 doses of diltiazem, patient converted to sinus rhythm, and symptomatically noted improvement in symptoms.  Question if this may be related to pseudoephedrine within Zyrtec-D.[BR1.1] In light of the patient s progressively weakness, she will be admitted to the hospitalist service under the care of Dr. Townsend for further treatment and care.[ZJ1.2]     Diagnosis:[ZJ1.1]    ICD-10-CM    1. Generalized weakness R53.1    2. Atrial fibrillation with RVR (H) I48.91    3. Cough R05    4. Asymptomatic bacteriuria R82.71[ZJ1.6]      Nikos Gayle  2/14/2017   [facility] EMERGENCY DEPARTMENT  I, Nikos Gayle, am serving as a scribe at 8:42 PM on 2/14/2017 to document services personally performed by Dudley  "Sean Sharpe MD based on my observations and the provider's statements to me.[ZJ1.1]       Sean Buckner MD  02/15/17 0837[BR1.2]       Sean Buckner MD  02/15/17 0838  [BR1.3]     Revision History        User Key Date/Time User Provider Type Action    > BR1.3 2/15/2017  8:38 AM Sean Buckner MD Physician Addend     BR1.2 2/15/2017  8:37 AM Sean Buckner MD Physician Sign     BR1.1 2/15/2017  8:34 AM Sean Buckner MD Physician      [N/A] 2/14/2017 11:48 PM Irwin, Nikos Scribe Share     ZJ1.5 2/14/2017 11:21 PM Irwin, Nikos Scribe Share     ZJ1.4 2/14/2017 10:40 PM Irwin, Nikos Scribe Share     ZJ1.6 2/14/2017 10:37 PM Irwin, Nikos Scribe      ZJ1.2 2/14/2017 10:29 PM Irwin, Nikos Scribe      ZJ1.3 2/14/2017  8:51 PM Irwin, Nikos Scribe Share     ZJ1.1 2/14/2017  8:42 PM Irwin, Nikos Scribe                   Procedure Notes     No notes of this type exist for this encounter.         Progress Notes - Therapies (Notes from 02/12/17 through 02/15/17)      Progress Notes by Dale Perez RT at 2/15/2017  1:06 AM     Author:  Dale Perez RT Service:  (none) Author Type:  Respiratory Therapist    Filed:  2/15/2017  1:08 AM Date of Service:  2/15/2017  1:06 AM Note Created:  2/15/2017  1:06 AM    Status:  Signed :  Dale Perez RT (Respiratory Therapist)         [facility] RCAT     Date:2/15/2017  Admission Dx:AFib with RVR  Pulmonary History:Asthma  Home Nebulizer/MDI Use:Alb mdi, Flovent   Home Oxygen:No  Acuity Level (RCAT flow sheet):4  Aerosol Therapy initiated:Albuterol Q4 prn      Pulmonary Hygiene initiated:Deep breath and coughing techniques      Volume Expansion initiated:IS      Current Oxygen Requirements:RA  Current SpO2:94%    Re-evaluation date:2/18/2017    Patient Education:Education performed with patient in regards to indications/benefits of bronchodilators. Will continue to educate with patient as needed.         See \"RT Assessments\" " flow sheet for patient assessment scoring and Acuity Level Details.[JW1.1]                Revision History        User Key Date/Time User Provider Type Action    > JW1.1 2/15/2017  1:08 AM Dale Perez RT Respiratory Therapist Sign                                                      INTERAGENCY TRANSFER FORM - LAB / IMAGING / EKG / EMG RESULTS   2/14/2017                       [facility] OBSERVATION DEPARTMENT: [phone]            Unresulted Labs     None         Lab Results - 3 Days      Blood culture [[id number]]  Resulted: 02/15/17 0702, Result status: Preliminary result    Ordering provider: Sean Buckner MD  02/14/17 2014 Resulting lab: [facility]    Specimen Information    Type Source Collected On   Blood  02/14/17 2047          Components       Value Reference Range Flag Lab   Specimen Description Blood Left Hand   75   Special Requests Aerobic and anaerobic bottles received   75   Culture Micro No growth after 9 hours   225   Micro Report Status Pending   225            Blood culture [[id number]]  Resulted: 02/15/17 0702, Result status: Preliminary result    Ordering provider: Sean Buckner MD  02/14/17 2014 Resulting lab: [facility]    Specimen Information    Type Source Collected On   Blood  02/14/17 2031          Components       Value Reference Range Flag Lab   Specimen Description Blood Right Hand   75   Special Requests Aerobic and anaerobic bottles received   75   Culture Micro No growth after 9 hours   225   Micro Report Status Pending   225            Urine Culture Aerobic Bacterial [[id number]]  Resulted: 02/15/17 0320, Result status: Preliminary result    Ordering provider: Sean Buckner MD  02/14/17 2005 Resulting lab: [facility]    Specimen Information    Type Source Collected On   Urine  02/14/17 2054          Components       Value Reference Range Flag Lab    Specimen Description Midstream Urine   FrRdHs   Special Requests Specimen received in preservative   75   Culture Micro Pending   75   Micro Report Status Pending   75            UA with Microscopic [[id number]] (Abnormal)  Resulted: 02/14/17 2139, Result status: Final result    Ordering provider: Sean Buckner MD  02/14/17 2005 Resulting lab: [facility]    Specimen Information    Type Source Collected On   Urine Urine clean catch 02/14/17 2054          Components       Value Reference Range Flag Lab   Color Urine Light Yellow   FrRdHs   Appearance Urine Clear   FrRdHs   Glucose Urine Negative NEG mg/dL  FrRdHs   Bilirubin Urine Negative NEG  FrRdHs   Ketones Urine 5 NEG mg/dL A FrRdHs   Specific Gravity Urine 1.003 1.003 - 1.035  FrRdHs   Blood Urine Negative NEG  FrRdHs   pH Urine 7.0 5.0 - 7.0 pH  FrRdHs   Protein Albumin Urine Negative NEG mg/dL  FrRdHs   Urobilinogen mg/dL Normal 0.0 - 2.0 mg/dL  FrRdHs   Nitrite Urine Negative NEG  FrRdHs   Leukocyte Esterase Urine Large NEG A FrRdHs   Source Midstream Urine   FrRdHs   WBC Urine 35 0 - 2 /HPF H FrRdHs   RBC Urine 2 0 - 2 /HPF  FrRdHs   Bacteria Urine Few NEG /HPF A FrRdHs   Squamous Epithelial /HPF Urine 5 0 - 1 /HPF H FrRdHs   Transitional Epi 1 0 - 1 /HPF  FrRdHs            INR [[id number]] (Abnormal)  Resulted: 02/14/17 2114, Result status: Final result    Ordering provider: Sean Buckner MD  02/14/17 2014 Resulting lab: [facility]    Specimen Information    Type Source Collected On   Blood  02/14/17 2031          Components       Value Reference Range Flag Lab   INR 2.29 0.86 - 1.14 H FrRdHs            Partial thromboplastin time [[id number]] (Abnormal)  Resulted: 02/14/17 2114, Result status: Final result    Ordering provider: Sean Buckner MD  02/14/17 2014 Resulting lab: [facility]    Specimen Information    Type Source Collected On   Blood  02/14/17 2031          Components       Value  Reference Range Flag Lab   PTT 52 22 - 37 sec H [facility]            Comprehensive metabolic panel [[id number]] (Abnormal)  Resulted: 02/14/17 2108, Result status: Final result    Ordering provider: Sean Buckner MD  02/14/17 2014 Resulting lab: [facility]    Specimen Information    Type Source Collected On   Blood  02/14/17 2031          Components       Value Reference Range Flag Lab   Sodium 140 133 - 144 mmol/L  FrStHsLb   Potassium 3.9 3.4 - 5.3 mmol/L  FrStHsLb   Chloride 105 94 - 109 mmol/L  FrStHsLb   Carbon Dioxide 23 20 - 32 mmol/L  FrStHsLb   Anion Gap 12 3 - 14 mmol/L  FrStHsLb   Glucose 107 70 - 99 mg/dL H FrStHsLb   Urea Nitrogen 12 7 - 30 mg/dL  FrStHsLb   Creatinine 0.77 0.52 - 1.04 mg/dL  FrStHsLb   GFR Estimate 71 >60 mL/min/1.7m2  FrStHsLb   Comment:  Non  GFR Calc   GFR Estimate If Black 86 >60 mL/min/1.7m2  FrStHsLb   Comment:  African American GFR Calc   Calcium 9.0 8.5 - 10.1 mg/dL  FrStHsLb   Bilirubin Total 0.4 0.2 - 1.3 mg/dL  FrStHsLb   Albumin 3.6 3.4 - 5.0 g/dL  FrStHsLb   Protein Total 7.4 6.8 - 8.8 g/dL  FrStHsLb   Alkaline Phosphatase 79 40 - 150 U/L  FrStHsLb   ALT 28 0 - 50 U/L  FrStHsLb   AST 36 0 - 45 U/L  FrStHsLb            Troponin I (now) [[id number]]  Resulted: 02/14/17 2108, Result status: Final result    Ordering provider: Sean Buckner MD  02/14/17 2014 Resulting lab: [facility]    Specimen Information    Type Source Collected On   Blood  02/14/17 2031          Components       Value Reference Range Flag Lab   Troponin I ES -- 0.000 - 0.045 ug/L  FrStHsLb   Result:         <0.015  The 99th percentile for upper reference range is 0.045 ug/L.  Troponin values in   the range of 0.045 - 0.120 ug/L may be associated with risks of adverse   clinical events.              CBC with platelets differential [[id number]]  Resulted: 02/14/17 2050, Result status: Final result    Ordering provider: Sean Buckner MD  02/14/17  2014 Resulting lab: [facility]    Specimen Information    Type Source Collected On   Blood  02/14/17 2031          Components       Value Reference Range Flag Lab   WBC 7.4 4.0 - 11.0 10e9/L  FrRdHs   RBC Count 4.22 3.8 - 5.2 10e12/L  FrRdHs   Hemoglobin 12.9 11.7 - 15.7 g/dL  FrRdHs   Hematocrit 39.8 35.0 - 47.0 %  FrRdHs   MCV 94 78 - 100 fl  FrRdHs   MCH 30.6 26.5 - 33.0 pg  FrRdHs   MCHC 32.4 31.5 - 36.5 g/dL  FrRdHs   RDW 13.2 10.0 - 15.0 %  FrRdHs   Platelet Count 283 150 - 450 10e9/L  FrRdHs   Diff Method Automated Method   FrRdHs   % Neutrophils 52.4 %  FrRdHs   % Lymphocytes 35.4 %  FrRdHs   % Monocytes 9.8 %  FrRdHs   % Eosinophils 2.0 %  FrRdHs   % Basophils 0.3 %  FrRdHs   % Immature Granulocytes 0.1 %  FrRdHs   Nucleated RBCs 0 0 /100  FrRdHs   Absolute Neutrophil 3.9 1.6 - 8.3 10e9/L  FrRdHs   Absolute Lymphocytes 2.6 0.8 - 5.3 10e9/L  FrRdHs   Absolute Monocytes 0.7 0.0 - 1.3 10e9/L  FrRdHs   Absolute Eosinophils 0.2 0.0 - 0.7 10e9/L  FrRdHs   Absolute Basophils 0.0 0.0 - 0.2 10e9/L  FrRdHs   Abs Immature Granulocytes 0.0 0 - 0.4 10e9/L  FrRdHs   Absolute Nucleated RBC 0.0   FrRdHs            Lactic acid whole blood [[id number]]  Resulted: 02/14/17 2044, Result status: Final result    Ordering provider: Sean Buckner MD  02/14/17 2014 Resulting lab: [facility]    Specimen Information    Type Source Collected On   Blood  02/14/17 2031          Components       Value Reference Range Flag Lab   Lactic Acid 1.1 0.7 - 2.1 mmol/L  FrRdHs            Testing Performed By     Lab - Abbreviation Name Director Address Valid Date Range    12 - FrRdHs [facility] Unknown [address] [facility] [address] 05/08/15 1057 - Present    14 - FrStHsLb [facility] Unknown [address] [place] [address] 05/08/15 1057 - Present    75 - Unknown [facility] Unknown [address] [facility] [address] 01/15/15 1019 - Present     225 - Unknown [facility] Unknown [address] [facility] [address] 12/19/14 0954 - Present               Imaging Results - 3 Days      XR Chest 2 Views [[id number]]  Resulted: 02/14/17 2152, Result status: Final result    Ordering provider: Sean Buckner MD  02/14/17 2005 Resulted by: Cinthya Calderón MD    Performed: 02/14/17 2118 - 02/14/17 2128 Resulting lab: RADIOLOGY RESULTS    Narrative:       CHEST TWO VIEWS   2/14/2017 9:28 PM     HISTORY: Fever    COMPARISON: None.      Impression:       IMPRESSION: No acute pulmonary disease. Borderline cardiomegaly. No  pulmonary edema.    CINTHYA CALDERÓN MD      Testing Performed By     Lab - Abbreviation Name Director Address Valid Date Range    104 - Rad Rslts RADIOLOGY RESULTS Unknown Unknown 02/16/05 1553 - Present            Encounter-Level Documents:     There are no encounter-level documents.      Order-Level Documents:     There are no order-level documents.

## 2017-02-14 NOTE — IP AVS SNAPSHOT
` ` Patient Information     Patient Name Sex     Chuyita Gayle ([id number]) Female 1932       Room Bed    220      Patient Demographics     Address Phone    [address]   [facility] [address] [phone] (Home)      Patient Ethnicity & Race     Ethnic Group Patient Race    American White      Emergency Contact(s)     Name Relation Home Work Mobile    HECTOR [phone]        Documents on File        Status Date Received Description       Documents for the Patient    Consent for EHR Access       Insurance Card       External Medication Information Consent       Patient ID       [facility] Specified Other       Consent for Services/Privacy Notice - Hospital/Clinic       Privacy Notice - [place] Received 17        Documents for the Encounter    CMS IM for Patient Signature       Observation Notice Received 02/15/17 s>m> RN    Discharge Attachment   FALLS IN THE HOME, PREVENTING (ENGLISH)    Discharge Attachment   ATRIAL FIBRILLATION, DISCHARGE INSTRUCTIONS FOR (ENGLISH)      Admission Information     Attending Provider Admitting Provider Admission Type Admission Date/Time    Ramo Townsend MD Parens, Karl R, MD Emergency 17    Discharge Date Hospital Service Auth/Cert Status Service Area     Observation Incomplete [facility] SERVICES    Unit Room/Bed Admission Status        OBSERVATION DEPT  Admission (Confirmed)       Admission     Complaint    Atrial fibrillation with RVR (H)      Hospital Account     Name Acct ID Class Status Primary Coverage    Chuyita Gayle [id number] Observation Open None            Guarantor Account (for Hospital Account #[id number])     Name Relation to Pt Service Area Active? Acct Type    Chuyita Gayle Self FCS Yes Personal/Family    Address Phone          [address] [place] [address] [phone](H)              Coverage Information (for Hospital Account #[id number])      Not on file

## 2017-02-14 NOTE — IP AVS SNAPSHOT
MRN:[id number]                      After Visit Summary   2/14/2017    Chuyita Gayle    MRN: [id number]           Thank you!     Thank you for choosing [facility] for your care. Our goal is always to provide you with excellent care. Hearing back from our patients is one way we can continue to improve our services. Please take a few minutes to complete the written survey that you may receive in the mail after you visit. If you would like to speak to someone directly about your visit please contact Patient Relations at [phone]. Thank you!          Patient Information     Date Of Birth          7/24/1932        About your hospital stay     You were admitted on:  February 14, 2017 You last received care in the:  [facility] Observation Department    You were discharged on:  February 15, 2017        Reason for your hospital stay       A fib with rapid ventricular rate. HR improved and rhythm converted to sinus after 2 doses of IV diltiazem in the ED. You remained in sinus rhythm and home atenolol was increased.                  Who to Call     For medical emergencies, please call 911.  For non-urgent questions about your medical care, please call your primary care provider or clinic, [phone]          Attending Provider     Provider Specialty    Sean Buckner MD Emergency Medicine    [facility], Ramo LOPEZ MD Internal Medicine       Primary Care Provider Office Phone # Fax #    Kenneth G Pallas, [phone]       [facility] [address] [address] [facility] [address]         When to contact your care team       Call your primary doctor if you have any of the following: temperature greater than 101, increased shortness of breath or increased chest pain or palpitations.                  After Care Instructions     Activity       Your activity upon discharge: activity as tolerated            Diet       Follow this diet upon discharge:  "Regular                  Follow-up Appointments     Follow-up and recommended labs and tests        Follow up with primary care provider, Kenneth G. Pallas, within 7 days for hospital follow- up.  No follow up labs or test are needed.  Increase home atenolol to 50 mg twice daily  Zio Patch on discharge  Resume home cares                             Warfarin Instruction     You have started taking a medicine called warfarin. This is a blood-thinning medicine (anticoagulant). It helps prevent and treat blood clots.      Before leaving the hospital, make sure you know how much to take and how long to take it.      You will need regular blood tests to make sure your blood is clotting safely. It is very important to see your doctor for regular blood tests.    Talk to your doctor before taking any new medicine (this includes over-the-counter drugs and herbal products). Many medicines can interact with warfarin. This may cause more bleeding or too much clotting.     Eating a lot of vitamin K--found in green, leafy vegetables--can change the way warfarin works in your body. Do NOT avoid these foods. Instead, try to eat the same amount each day.     Bleeding is the most common side-effect of warfarin. You may notice bleeding gums, a bloody nose, bruises and bleeding longer when you cut yourself. See a doctor at once if:   o You cough up blood  o You find blood in your stool (poop)  o You have a deep cut, or a cut that bleeds longer than 10 minutes   o You have a bad cut, hard fall, accident or hit your head (go to urgent care or the emergency room).    For women who can get pregnant: This medicine can harm an unborn baby. Be very careful not to get pregnant while taking this medicine. If you think you might be pregnant, call your doctor right away.    For more information, read \"Guide to Warfarin Therapy,  the booklet you received in the hospital.        Pending Results     Date and Time Order Name Status Description    " "2/15/2017 1010 Zio Patch Holter In process     2017 Blood culture Preliminary     2017 Blood culture Preliminary     2017 Urine Culture Aerobic Bacterial Preliminary             Statement of Approval     Ordered          02/15/17 1011  I have reviewed and agree with all the recommendations and orders detailed in this document.  EFFECTIVE NOW     Approved and electronically signed by:  Sheridan Rhodes PA-C             Admission Information     Date & Time Provider Department Dept. Phone    2017 Ramo Townsend MD [facility] Observation Department [phone]      Your Vitals Were     Blood Pressure Pulse Temperature Respirations Height Weight    157/87 129 97  F (36.1  C) (Oral) 16 1.6 m (5' 3\") 65.1 kg (143 lb 9.6 oz)    Pulse Oximetry BMI (Body Mass Index)                96% 25.44 kg/m2          MyChart Information     [employer] lets you send messages to your doctor, view your test results, renew your prescriptions, schedule appointments and more. To sign up, go to [email or web address] . Click on \"Log in\" on the left side of the screen, which will take you to the Welcome page. Then click on \"Sign up Now\" on the right side of the page.     You will be asked to enter the access code listed below, as well as some personal information. Please follow the directions to create your username and password.     Your access code is: [email or web address]  Expires: 2017 10:30 AM     Your access code will  in 90 days. If you need help or a new code, please call your [place] clinic or [phone].        Care EveryWhere ID     This is your Care EveryWhere ID. This could be used by other organizations to access your [place] medical records  [id number]           Review of your medicines      CONTINUE these medicines which may have CHANGED, or have new prescriptions. If we are uncertain of the size of tablets/capsules you have at home, strength may be listed as " something that might have changed.        Dose / Directions    atenolol 50 MG tablet   Commonly known as:  TENORMIN   This may have changed:    - medication strength  - how much to take  - when to take this  - Another medication with the same name was removed. Continue taking this medication, and follow the directions you see here.        Dose:  50 mg   Take 1 tablet (50 mg) by mouth 2 times daily   Quantity:  30 tablet   Refills:  1       gabapentin 300 MG capsule   Commonly known as:  NEURONTIN   This may have changed:  Another medication with the same name was removed. Continue taking this medication, and follow the directions you see here.        Dose:  600 mg   Take 600 mg by mouth At Bedtime   Refills:  0       LORazepam 0.5 MG tablet   Commonly known as:  ATIVAN   This may have changed:  Another medication with the same name was removed. Continue taking this medication, and follow the directions you see here.   Used for:  Anxiety        Dose:  0.5 mg   Take 1 tablet (0.5 mg) by mouth 2 times daily as needed for anxiety   Quantity:  30 tablet   Refills:  0       omeprazole 20 MG CR capsule   Commonly known as:  priLOSEC   This may have changed:  Another medication with the same name was removed. Continue taking this medication, and follow the directions you see here.        Dose:  20 mg   Take 20 mg by mouth daily   Refills:  0       warfarin 2.5 MG tablet   Commonly known as:  COUMADIN   This may have changed:  Another medication with the same name was removed. Continue taking this medication, and follow the directions you see here.        Take by mouth daily 2.5mg on Sun,Tues,Thurs,Sat, & 1.25mg on Mon,Wed,Fri   Refills:  0         CONTINUE these medicines which have NOT CHANGED        Dose / Directions    acetaminophen 500 MG tablet   Commonly known as:  TYLENOL        Dose:  1000 mg   Take 1,000 mg by mouth 2 times daily   Refills:  0       * albuterol 108 (90 BASE) MCG/ACT Inhaler   Commonly known as:   PROAIR HFA/PROVENTIL HFA/VENTOLIN HFA        Dose:  2 puff   Inhale 2 puffs into the lungs every 4 hours as needed   Refills:  0       * albuterol (2.5 MG/3ML) 0.083% neb solution        Dose:  1 vial   Take 1 vial by nebulization every 4 hours as needed for shortness of breath / dyspnea or wheezing   Refills:  0       BREO ELLIPTA 100-25 MCG/INH oral inhaler   Generic drug:  fluticasone-vilanterol        Dose:  1 puff   Inhale 1 puff into the lungs daily   Refills:  0       Fish Oil 1200 MG Caps        Dose:  1 capsule   Take 1 capsule by mouth daily   Refills:  0       fluticasone 50 MCG/ACT spray   Commonly known as:  FLONASE        Dose:  1 spray   Spray 1 spray into both nostrils daily as needed for rhinitis or allergies   Refills:  0       guaiFENesin-dextromethorphan 100-10 MG/5ML syrup   Commonly known as:  ROBITUSSIN DM        Dose:  5 mL   Take 5 mLs by mouth every 4 hours as needed for cough   Refills:  0       MULTIVITAMIN ADULT PO        Dose:  1 tablet   Take 1 tablet by mouth daily   Refills:  0       naproxen 500 MG tablet   Commonly known as:  NAPROSYN        Dose:  500 mg   Take 500 mg by mouth 2 times daily as needed for moderate pain   Refills:  0       senna-docusate 8.6-50 MG per tablet   Commonly known as:  SENOKOT-S;PERICOLACE        Dose:  1 tablet   Take 1 tablet by mouth 2 times daily as needed for constipation   Refills:  0       VALTREX 500 MG tablet   Generic drug:  valACYclovir        Dose:  500 mg   Take 500 mg by mouth 2 times daily as needed (x3 days for cold sores)   Refills:  0       Vitamin D (Cholecalciferol) 1000 UNITS Caps        Dose:  1000 Units   Take 1,000 Units by mouth daily   Refills:  0       * Notice:  This list has 2 medication(s) that are the same as other medications prescribed for you. Read the directions carefully, and ask your doctor or other care provider to review them with you.      STOP taking     cetirizine-psuedoePHEDrine 5-120 MG per 12 hr tablet    Commonly known as:  zyrTEC-D           fluticasone 110 MCG/ACT Inhaler   Commonly known as:  FLOVENT HFA                Where to get your medicines      These medications were sent to [place], [place] - [address] [facility]  [address] [facility] [address]     Phone:  [phone]     atenolol 50 MG tablet         Some of these will need a paper prescription and others can be bought over the counter. Ask your nurse if you have questions.     Bring a paper prescription for each of these medications     LORazepam 0.5 MG tablet                Protect others around you: Learn how to safely use, store and throw away your medicines at www.disposemymeds.org.             Medication List: This is a list of all your medications and when to take them. Check marks below indicate your daily home schedule. Keep this list as a reference.      Medications           Morning Afternoon Evening Bedtime As Needed    acetaminophen 500 MG tablet   Commonly known as:  TYLENOL   Take 1,000 mg by mouth 2 times daily   Last time this was given:  1,000 mg on 2/14/2017 10:14 PM                                * albuterol 108 (90 BASE) MCG/ACT Inhaler   Commonly known as:  PROAIR HFA/PROVENTIL HFA/VENTOLIN HFA   Inhale 2 puffs into the lungs every 4 hours as needed                                * albuterol (2.5 MG/3ML) 0.083% neb solution   Take 1 vial by nebulization every 4 hours as needed for shortness of breath / dyspnea or wheezing                                atenolol 50 MG tablet   Commonly known as:  TENORMIN   Take 1 tablet (50 mg) by mouth 2 times daily   Last time this was given:  50 mg on 2/15/2017  7:57 AM                                BREO ELLIPTA 100-25 MCG/INH oral inhaler   Inhale 1 puff into the lungs daily   Generic drug:  fluticasone-vilanterol                                Fish Oil 1200 MG Caps   Take 1 capsule by mouth daily                                fluticasone 50  MCG/ACT spray   Commonly known as:  FLONASE   Spray 1 spray into both nostrils daily as needed for rhinitis or allergies                                gabapentin 300 MG capsule   Commonly known as:  NEURONTIN   Take 600 mg by mouth At Bedtime                                guaiFENesin-dextromethorphan 100-10 MG/5ML syrup   Commonly known as:  ROBITUSSIN DM   Take 5 mLs by mouth every 4 hours as needed for cough                                LORazepam 0.5 MG tablet   Commonly known as:  ATIVAN   Take 1 tablet (0.5 mg) by mouth 2 times daily as needed for anxiety                                MULTIVITAMIN ADULT PO   Take 1 tablet by mouth daily                                naproxen 500 MG tablet   Commonly known as:  NAPROSYN   Take 500 mg by mouth 2 times daily as needed for moderate pain                                omeprazole 20 MG CR capsule   Commonly known as:  priLOSEC   Take 20 mg by mouth daily                                senna-docusate 8.6-50 MG per tablet   Commonly known as:  SENOKOT-S;PERICOLACE   Take 1 tablet by mouth 2 times daily as needed for constipation                                VALTREX 500 MG tablet   Take 500 mg by mouth 2 times daily as needed (x3 days for cold sores)   Generic drug:  valACYclovir                                Vitamin D (Cholecalciferol) 1000 UNITS Caps   Take 1,000 Units by mouth daily                                warfarin 2.5 MG tablet   Commonly known as:  COUMADIN   Take by mouth daily 2.5mg on Sun,Tues,Thurs,Sat, & 1.25mg on Mon,Wed,Fri                                * Notice:  This list has 2 medication(s) that are the same as other medications prescribed for you. Read the directions carefully, and ask your doctor or other care provider to review them with you.              More Information        Preventing Falls in the Home  As you get older, falls are more likely. That s because your reaction time slows. Your muscles and joints may also get stiffer,  making them less flexible. Illness, medications, and vision changes can also affect your balance. A fall could leave you unable to live on your own. To make your home safer, follow these tips:   Floors:    Put nonskid pads under area rugs.    Remove throw rugs.    Replace worn floor coverings.    Tack carpets firmly to each step on carpeted stairs. Put nonskid strips on the edges of uncarpeted stairs.    Keep floors and stairs free of clutter and cords.    Arrange furniture so there are clear pathways.    Clean up any spills right away.   Bathrooms:      Install grab bars in the tub or shower.    Apply nonskid strips or put a nonskid rubber mat in the tub or shower.    Sit on a bath chair to bathe.    Use bathmats with nonskid backing.   Lighting:    Keep a flashlight in each room.    Put a nightlight along the pathway between the bedroom and the bathroom.    9076-2066 [employer]. [address] [place] [address]. All rights reserved. This information is not intended as a substitute for professional medical care. Always follow your healthcare professional's instructions.                Discharge Instructions for Atrial Fibrillation  You have been diagnosed with atrial fibrillation. With this condition, your heart s two upper chambers quiver rather than squeeze the blood out in a normal pattern. This leads to an irregular and sometimes rapid heartbeat. Some people will develop associated symptoms such as a flip-flopping heartbeat, lightheadedness, or shortness of breath. Other people may have no symptoms at all. Atrial fibrillation is serious because it affects the heart s ability to fill with blood as it should. Blood clots may form. This increases the risk for stroke. Untreated atrial fibrillation can also lead to heart failure. Atrial fibrillation can be controlled. With treatment, most people with atrial fibrillation lead normal lives. It is estimated that over 2.5 million Americans have  atrial fibrillation.  Treatment options  Recommended treatment for atrial fibrillation depends on your age, symptoms, how long you have had atrial fibrillation, and other factors. You will have a complete evaluation to find out if you have any abnormalities that caused your heart to go into atrial fibrillation. This might be blocked heart arteries or a thyroid problem. Your doctor will assess your particular case and discuss choices with you.  Treatment choices may include:    Treating an underlying disorder that puts you at risk for atrial fibrillation. For example, correcting an abnormal thyroid or electrolyte problem, or treating a blocked heart artery.    Restoring a normal heart rhythm with an electrical shock (cardioversion) or with an antiarrhythmic medicine (chemical cardioversion)    Using medication to control your heart rate in atrial fibrillation.    Preventing the risk for blood clot and stroke using blood-thinning medicines. Your doctor will tell you what he or she recommends. Choices may include aspirin, clopidogrel, warfarin, dabigatran, rivaroxaban, or apixaban.    Doing catheter ablation or maze procedure. These use different methods to destroy certain areas of heart tissue. This interrupts the electrical signals causing atrial fibrillation. One of these procedures may be a choice when medicines do not work.    Other treatment choices may be recommended for you by your doctor.  Managing risk factors for stroke and preventing heart failure are important parts of any treatment plan for atrial fibrillation.  Home care    Take your medicines exactly as directed. Don t skip doses.    Work with your doctor to find the right medicaines and doses for you.    Learn to take your own pulse. Keep a record of your results. Ask your doctor which pulse rates mean that you need medical attention. Slowing your pulse is often the goal of treatment. Ask your doctor if it s OK for you to use an automatic machine to  check your pulse at home. Sometimes these machines don t count the pulse correctly when you have atrial fibrillation.    Limit your intake of coffee, tea, cola, and other beverages with caffeine to 2 cups per day. Talk with your doctor about whether you should eliminate caffeine.    Avoid over-the-counter medicines that have caffeine in them.    Let your doctor know what medicines you take, including prescription and over-the-counter medicines, as well as any supplements. They interfere with some medicines given for atrial fibrillation.    Ask your doctor about whether you can drink alcohol. Some people need to avoid alcohol to better treat atrial fibrillation. If you are taking blood-thinner medicines, alcohol may interfere with them by increasing their effect.    Never take stimulants such as amphetamines or cocaine. These drugs can speed up your heart rate and trigger atrial fibrillation.  Follow-up  Make a follow-up appointment as directed by our staff.     When to call your doctor  Call your doctor immediately if you have any of the following:    Weakness    Dizziness    Fainting    Fatigue    Shortness of breath    Chest pain with increased activity    A change in the usual regularity of your heartbeat, or an unusually fast heartbeat     3272-9123 [employer]. [address] [place] [address]. All rights reserved. This information is not intended as a substitute for professional medical care. Always follow your healthcare professional's instructions.

## 2017-02-14 NOTE — IP AVS SNAPSHOT
[facility] Observation Department    [address]    [facility] [address]    Phone:  [phone]                                       After Visit Summary   2/14/2017    Chuyita Gayle    MRN: [id number]           After Visit Summary Signature Page     I have received my discharge instructions, and my questions have been answered. I have discussed any challenges I see with this plan with the nurse or doctor.    ..........................................................................................................................................  Patient/Patient Representative Signature      ..........................................................................................................................................  Patient Representative Print Name and Relationship to Patient    ..................................................               ................................................  Date                                            Time    ..........................................................................................................................................  Reviewed by Signature/Title    ...................................................              ..............................................  Date                                                            Time

## 2017-02-14 NOTE — Clinical Note
Admitting Physician: PERRY NAQVI [[id number]]   [facility] [[id number]]   Bed Type: Cardiac Telemetry [44]   Special needs: Fall Risk [8]   Bed request comments: TELE Weakness AFib with RVR

## 2017-02-15 NOTE — PHARMACY-ADMISSION MEDICATION HISTORY
Admission medication history interview status for this patient is complete. See EPIC admission navigator for allergy information, prior to admission medications and immunization status.     Medication history interview source(s):Patient is a resident at [facility]  Medication history resources (including written lists, pill bottles, clinic record): Medication list from [facility]  Primary pharmacy: n/a    Changes made to PTA medication list:  Added: Almost all meds  Deleted: flovent hfa  Changed: added dosing to atenolol & several meds on list    Actions taken by pharmacist (provider contacted, etc):None     Additional medication history information:    **[facility] nurse sets up weekly pill box    Medication reconciliation/reorder completed by provider prior to medication history? Yes        For patients on insulin therapy: No  Lantus/levemir/NPH/Mix 70/30 dose:  _____   in AM/PM  or twice daily   Sliding scale Novolog Y/N  If Yes, do you have a baseline novolog pre-meal dose:  ______units with meals   Patients eat three meals a day:   Y/N     Any Barriers to therapy:  cost of medications/comfortable with giving injections (if applicable)/ comfortable and confident with current diabetes regimen       Prior to Admission medications    Medication Sig Last Dose Taking? Auth Provider   atenolol (TENORMIN) 50 MG tablet Take 1 tablet (50 mg) by mouth 2 times daily  Yes Sheridan Rhodes PA-C   acetaminophen (TYLENOL) 500 MG tablet Take 1,000 mg by mouth 2 times daily 2/14/2017 at am Yes Unknown, Entered By History   atenolol (TENORMIN) 25 MG tablet Take 25 mg by mouth 2 times daily 2/14/2017 at am Yes Unknown, Entered By History   fluticasone-vilanterol (BREO ELLIPTA) 100-25 MCG/INH oral inhaler Inhale 1 puff into the lungs daily 2/14/2017 at Unknown time Yes Unknown, Entered By History   Omega-3 Fatty Acids (FISH OIL) 1200 MG CAPS Take 1 capsule by mouth daily 2/14/2017 at Unknown time Yes Unknown,  Entered By History   gabapentin (NEURONTIN) 300 MG capsule Take 600 mg by mouth At Bedtime 2/14/2017 at Unknown time Yes Unknown, Entered By History   Multiple Vitamins-Minerals (MULTIVITAMIN ADULT PO) Take 1 tablet by mouth daily 2/14/2017 at Unknown time Yes Unknown, Entered By History   omeprazole (PRILOSEC) 20 MG CR capsule Take 20 mg by mouth daily 2/14/2017 at Unknown time Yes Unknown, Entered By History   Vitamin D, Cholecalciferol, 1000 UNITS CAPS Take 1,000 Units by mouth daily 2/14/2017 at Unknown time Yes Unknown, Entered By History   warfarin (COUMADIN) 2.5 MG tablet Take by mouth daily 2.5mg on Sun,Tues,Thurs,Sat, & 1.25mg on Mon,Wed,Fri 2/14/2017 at Unknown time Yes Unknown, Entered By History   cetirizine-psuedoePHEDrine (ZYRTEC-D) 5-120 MG per 12 hr tablet Take 1 tablet by mouth 2 times daily as needed for allergies Unknown at Unknown time  Unknown, Entered By History   fluticasone (FLONASE) 50 MCG/ACT spray Spray 1 spray into both nostrils daily as needed for rhinitis or allergies Unknown at Unknown time  Unknown, Entered By History   guaiFENesin-dextromethorphan (ROBITUSSIN DM) 100-10 MG/5ML syrup Take 5 mLs by mouth every 4 hours as needed for cough Unknown at Unknown time  Unknown, Entered By History   albuterol (2.5 MG/3ML) 0.083% neb solution Take 1 vial by nebulization every 4 hours as needed for shortness of breath / dyspnea or wheezing Unknown at Unknown time  Unknown, Entered By History   LORazepam (ATIVAN) 0.5 MG tablet Take 0.5 mg by mouth 2 times daily as needed for anxiety Unknown at Unknown time  Unknown, Entered By History   naproxen (NAPROSYN) 500 MG tablet Take 500 mg by mouth 2 times daily as needed for moderate pain Unknown at Unknown time  Unknown, Entered By History   senna-docusate (SENOKOT-S;PERICOLACE) 8.6-50 MG per tablet Take 1 tablet by mouth 2 times daily as needed for constipation Unknown at Unknown time  Unknown, Entered By History   valACYclovir (VALTREX) 500 MG tablet  Take 500 mg by mouth 2 times daily as needed (x3 days for cold sores) Unknown at Unknown time  Unknown, Entered By History   albuterol (PROAIR HFA/PROVENTIL HFA/VENTOLIN HFA) 108 (90 BASE) MCG/ACT Inhaler Inhale 2 puffs into the lungs every 4 hours as needed  Unknown at Unknown time  Reported, Patient

## 2017-02-15 NOTE — PHARMACY - DISCHARGE MEDICATION RECONCILIATION
Clinical Pharmacy- Warfarin Discharge Note    Warfarin discharge dose reviewed.  Patient is discharging on prior to admission dose.    Anticoagulation Dose History     Recent Dosing and Labs Latest Ref Rng & Units 2/14/2017    INR 0.86 - 1.14 2.29(H)

## 2017-02-15 NOTE — PROGRESS NOTES
Ziopatch applied for 10 days.  Patient's Granddaughter Carolina was contacted and went over instructions with her.

## 2017-02-15 NOTE — PROGRESS NOTES
Cm set up HE transportation by request of the family for transport back to [facility] [address] [facility] Wednesday 2/15/17 at 1330.    Neisha Dalal RN   Care Coordinator  [phone]

## 2017-02-15 NOTE — DISCHARGE SUMMARY
[facility] Outpatient / Observation Unit  Discharge Summary        Chuyita Gayle MRN# [id number]   YOB: 1932 Age: 84 year old     Date of Admission:  2/14/2017  Date of Discharge:  2/15/2017  Admitting Physician:  Ramo Townsend MD  Discharge Physician: Sheridan Rhodes PA-C  Discharging Service: Hospitalist      Primary Provider: Pallas, Kenneth G  Primary Care Physician Phone Number: [phone]         Primary Discharge Diagnoses:    Chuyita Gayle was admitted on 2/14/2017 for concerns of generalized weakness and found to be in a fib with RVR.     1. A fib with RVR - hx of PAF, on coumadin. Presented to ED with generalized weakness, noted to be in a fib with RVR, HRs 130s. HR improved and rhythm converted to SR after IV diltiazem x2 in ED. Home atenolol was increased to 50 mg BID and rhythm remained in SR overnight. Weakness improved. Will discharge with Zio Patch to further assess heart rate control. Follow up with PCP for further recommendations. Continue coumadin. Recommend avoiding decongestants at home  2. Generalized weakness - likely due to a fib with RVR and recent hospitalization for hip pain. Discharged home on 2/13 with Home Nurse/PT/OT services. Ok to return home with continued home services.           Secondary Discharge Diagnoses:   PAF  HTN           Code Status:      DNR / DNI        Brief Hospital Summary:        Reason for your hospital stay       A fib with rapid ventricular rate. HR improved and rhythm converted to   sinus after 2 doses of IV diltiazem in the ED. You remained in sinus   rhythm and home atenolol was increased.                    Please refer to initial admission history and physical for further details.   Briefly, Chuyita Gayle was admitted on 2/14/2017 for concerns of generalized weakness and found to be in a fib with RVR.   Initial work up in the ED did not reveal evidence of STEMI or findings consistent with unstable angina or acute coronary ischemia.  Rhythm converted to sinus rhythm after IV diltiazem x2 in the ED. Pt was registered to the Observation Unit for further evaluation.   Pt was monitored on tele and pt remained in SR overnight. Denies chest pain. Strength improved overnight and was able to ambulate with walker, she was slow but steady. Labs were reviewed and significant results addressed. On the day of discharge, pt was pain free and had no other complaints. Medications were reviewed and adjustments made as necessary. Pt is instructed to follow up as below.             Significant Lab During Hospitalization:        Recent Labs  Lab 02/14/17 2031   WBC 7.4   HGB 12.9   HCT 39.8   MCV 94          Recent Labs  Lab 02/14/17 2031      POTASSIUM 3.9   CHLORIDE 105   CO2 23   ANIONGAP 12   *   BUN 12   CR 0.77   GFRESTIMATED 71   GFRESTBLACK 86   TRACEE 9.0   PROTTOTAL 7.4   ALBUMIN 3.6   BILITOTAL 0.4   ALKPHOS 79   AST 36   ALT 28       Recent Labs  Lab 02/14/17 2031   INR 2.29*       Recent Labs  Lab 02/14/17 2031   LACT 1.1       Recent Labs  Lab 02/14/17 2031   TROPI <0.015The 99th percentile for upper reference range is 0.045 ug/L.  Troponin values in the range of 0.045 - 0.120 ug/L may be associated with risks of adverse clinical events.       Recent Labs  Lab 02/14/17 2054   COLOR Light Yellow   APPEARANCE Clear   URINEGLC Negative   URINEBILI Negative   URINEKETONE 5*   SG 1.003   UBLD Negative   URINEPH 7.0   PROTEIN Negative   NITRITE Negative   LEUKEST Large*   RBCU 2   WBCU 35*                Significant Imaging During Hospitalization:      Recent Results (from the past 48 hour(s))   XR Chest 2 Views    Narrative    CHEST TWO VIEWS   2/14/2017 9:28 PM     HISTORY: Fever    COMPARISON: None.      Impression    IMPRESSION: No acute pulmonary disease. Borderline cardiomegaly. No  pulmonary edema.    CINTHYA CALDERÓN MD              Pending Results:        Unresulted Labs Ordered in the Past 30 Days of this Admission     Date and  "Time Order Name Status Description    2/14/2017 2014 Blood culture Preliminary     2/14/2017 2014 Blood culture Preliminary     2/14/2017 2005 Urine Culture Aerobic Bacterial Preliminary               Consultations This Hospital Stay:      No consultations were requested during this admission         Discharge Instructions and Follow-Up:      Follow-up Appointments     Follow-up and recommended labs and tests        Follow up with primary care provider, Kenneth G. Pallas, within 7 days   for hospital follow- up.  No follow up labs or test are needed.  Increase home atenolol to 50 mg twice daily  Zio Patch on discharge  Resume home cares                  Pt instructed to follow up with PCP in 7 days and with Consultant if indicated.   Follow-up Labs None        Discharge Disposition:      Discharged to home         Discharge Medications:        Current Discharge Medication List      CONTINUE these medications which have CHANGED    Details   atenolol (TENORMIN) 50 MG tablet Take 1 tablet (50 mg) by mouth 2 times daily  Qty: 30 tablet, Refills: 1    Associated Diagnoses: Atrial fibrillation with RVR (H)         CONTINUE these medications which have NOT CHANGED    Details   fluticasone (FLOVENT HFA) 110 MCG/ACT Inhaler Inhale 1 puff into the lungs 2 times daily      GABAPENTIN PO Take 600 mg by mouth      OMEPRAZOLE PO       WARFARIN SODIUM PO       albuterol (PROAIR HFA/PROVENTIL HFA/VENTOLIN HFA) 108 (90 BASE) MCG/ACT Inhaler Inhale 2 puffs into the lungs every 6 hours      LORazepam (ATIVAN PO)                  Allergies:         Allergies   Allergen Reactions     Celecoxib      Nabumetone      Sulfa Drugs      Vioxx [Rofecoxib]            Condition and Physical on Discharge:      Discharge condition: Stable   Vitals: Blood pressure 163/59, pulse 129, temperature 96.8  F (36  C), temperature source Oral, resp. rate 16, height 1.6 m (5' 3\"), weight 65.1 kg (143 lb 9.6 oz), SpO2 94 %.  143 lbs 9.6 oz      GENERAL:  " Comfortable.  PSYCH: pleasant, oriented, No acute distress.  HEART:  Normal S1, S2 with no murmur, no pericardial rub, gallops or S3 or S4.  LUNGS:  Clear to auscultation, normal Respiratory effort. No wheezing, rales or ronchi.  EXTREMITIES:  Able to ambulate in hallway with walker.  SKIN:  Dry to touch, No obvious rash, wound or ulcerations.  NEUROLOGIC:  Grossly intact     Sheridan Rhodes PA-C

## 2017-02-15 NOTE — ED NOTES
Discharge instructions explained to patient.  Intra-agency form faxed to the [facility]. Patient given opportunity to ask questions.     OBSERVATION patient END time: 1327    Miles Lyons RN

## 2017-02-15 NOTE — PLAN OF CARE
Problem: Discharge Planning  Goal: Discharge Planning (Adult, OB, Behavioral, Peds)  Outcome: Adequate for Discharge Date Met:  02/15/17        1. Negative Serial Troponin Yes  2. Resolution of chest pain Yes  3. Pain status: Pain free.  4. Negative stress test No  5. Stable vital signs Yes  6. ADLs back to baseline? No - pt. Up with assist of 1 and 2WW. Pt. Needed some assistance moving from sitting to standing position. Pt. Moved with assist of 1 and walker into bathroom and to chair for breakfast. Pt. Moved very slowly. When asked if she felt weak and if she would be able to go home with the way she is moving, pt. Stated that she's feeling stronger as she is moving  7. Activity and level of assistance: Ambulating independently.  8. Barriers to discharge noted Yes - questioning whether pt. Is safe with ambulation  9.Interpretation of rhythm per telemetry tech:SR with slight ST depression at 0706 per report      Is patient a falls risk? Yes Reason for falls risk: Mobility  Falls armband on? YES  Within Arm's Reach? Yes   Bed alarm turned on? YES  Personal alarm in place and turned on? YES      Pt. Answering questions appropriately, but she is very slow to respond. Slow to tell RN even what she wanted for breakfast. Pt. Reports she feels stronger, walking slowly with walker and SBA. Will continue to walk pt. Pt. Reports she uses 4 wheel walker at baseline at home. Zio patch on.  Pt. Waiting for transport.

## 2017-02-15 NOTE — H&P
NOTE:  Please note that the patient is known also under the medical record number of [id number], which relates to the recent hospitalization from 02/10 to 02/13.      IDENTIFICATION AND CHIEF COMPLAINT:  Ms. Chuyita Gayle is an 84-year-old woman who was just in the hospital here from 02/10 to 02/13 for a complaint of left hip pain which was felt to be due to osteoarthritis.  She comes back to attention tonight due to weakness, and she was found to be in atrial fibrillation with rapid ventricular response.      HISTORY OF PRESENT ILLNESS:  Chart review indicates that Ms. Gayle has rather chronic hip pain and paroxysmal atrial fibrillation, and came to attention on 02/10 with hip pain.  She remained in the hospital, and apparently there was some difficulty with arranging for her to get additional assistance at her assisted living.  She seemed to improve significantly during the time of her observation here, and was discharged on 02/13 with plan to follow up with physical therapy.      The patient states to me that she frequently feels palpitations in the nighttime, but it seemed that tonight she apparently felt as though she could not get out of bed, so she called EMS.  Otherwise, though, the patient has not been particularly ill, though she describes a rather chronic cough.  She has not been particularly breathless.  She has not had nausea, vomiting, diarrhea or constipation, or urinary change.      REVIEW OF SYSTEMS:  Comprehensive 10-system review is completed and negative except as otherwise documented in the history of present illness.      PRIOR MEDICAL HISTORY:   1.  Paroxysmal atrial fibrillation.   2.  Hypertension.   3.  Asthma.   4.  Gastroesophageal reflux disease.   5.  Anxiety disorder.   6.  Osteoarthritis.      PRIOR SURGICAL HISTORY:   1.  Orthopedic procedures, not specified.   2.  Cholecystectomy.   3.  Back surgery, not otherwise specified.      SOCIAL HISTORY:  The patient's  and children  have .  She has a couple of grandchildren who are close by and still quite involved in her life.  She is a nonsmoker, but states that she had a significant amount of passive smoke exposure as a young child.  She is a nondrinker.      ALLERGIES:  Reported to nabumetone, sulfa drugs, Celebrex, all of which cause rash.      MEDICATIONS PRIOR TO HOSPITALIZATION:   1.  Atenolol 25 mg p.o. b.i.d.   2.  Breo Ellipta (100/25) 1 inhalation daily.   3.  Omege-3 fatty acids 1 cap daily.   4.  Gabapentin 600 mg p.o. at bedtime.   5.  Multivitamin 1 p.o. daily.   6.  Omeprazole 20 mg p.o. daily.   7.  Vitamin D 1000 international units p.o. daily.   8.  Warfarin 1.25 mg every Monday, Wednesday, Friday; 2.5 mg on other days.   9.  Albuterol nebulizer every 4 hours as needed.   10.  Flonase 1 spray in each naris daily as needed.   11.  Lorazepam 0.5 mg p.o. b.i.d. as needed.   12.  Cetirizine 10 mg p.o. daily as needed.      OBJECTIVE:   GENERAL:  Ms. Gayle is a pleasant, elderly,  female, resting quietly on a gurney in the Emergency Department.  She is alert, coherent, and in no apparent distress.   VITAL SIGNS:  On presentation, the patient was afebrile.  Heart rate initially 129, ranging up to as high as 150; but when I saw her, her heart rate had come down to 75 and appeared to be normal sinus on the monitor.  Initial blood pressure 177/108 and came down to 1-teens/66, with respirations 16 and unlabored in room air, with oxygen saturation in the mid-90s.  Weight on admission estimated at 70 kg, with body mass index approximately 30.   HEENT:  Cranium is normocephalic and atraumatic.  The eyes are without remarkable scleral icterus or conjunctival injection.  Extraocular motions are conjugate.  Nares and oropharynx are free of obvious lesions.  Dentition appears to be in good repair.   NECK:  Supple without remarkable cervical or supraclavicular lymphadenopathy or thyromegaly.   CHEST:  Clear to auscultation in  all fields without rales, wheezes or egophony.   HEART:  Regular without rubs, murmurs or gallops.  As noted above, on the telemetry monitor, the patient is in normal sinus rhythm.  Peripheral pulses are easily palpated in radial pulses bilaterally.   ABDOMEN:  Soft, nontender, nondistended, without appreciable hepatosplenomegaly.   GENITOURINARY:  Examination deferred by me.   MUSCULOSKELETAL:  No remarkable tenderness over the posterior spinous processes or the paraspinous muscles.  No deformities are noted over the extremities.  No remarkable tenderness over the calves.  Distal perfusion is intact.   SKIN:  Free of obvious lesions over the exposed areas of the distal extremities, abdomen, back, head and neck.   NEUROLOGIC:  Muscle tone and bulk appear to be within normal limits.  Cranial nerves II-XII are intact.  The patient is appropriate and coherent, and gives a full history.      LABORATORY:  White cell count 7.4 with normal differential, hemoglobin 12.9, platelet count 283,000.  Initial INR 2.29.  Troponin less than 0.015.  Lactic acid is 1.1.  Comprehensive metabolic panel is entirely normal with glucose of 107, creatinine 0.77.      Urinalysis, which is a midstream, does show 35 white cells per high-powered field, but with 5 squamous epithelial cells, significant contamination.      Chest x-ray shows no acute infiltrate.      EKG:  Initial EKG shows atrial fibrillation with a rate of 132 beats per minute.  Axes are normal.  QRS is narrow.  There may be some ST depression in the lateral leads as well.      ASSESSMENT:  Ms. Gayle is an 84-year-old woman who was admitted just a couple of days ago with hip pain.  I do not think that relates significantly to her current issues, although on discharge, for some reason, she was given cetirizine with pseudoephedrine, and certainly pseudoephedrine could cause the patient to go into a rapid ventricular response.      At this point, the patient is in a normal sinus  rhythm after receiving two doses of diltiazem.  She otherwise appears to be quite well, though apparently she has some degree of anxiety, at least, and may be indicating to us that she needs to be at a higher level of care.      DIAGNOSES:   1.  A fib with RVR in the setting of known paroxysmal atrial fibrillation.   2.  Therapeutic anticoagulation.   3.  Hypertension.   4.  Asthma.      PLAN:   1.  Admit to observation on telemetry.   2.  We will increase the patient's dose of atenolol to 50 mg p.o. b.i.d.   3.  Discourage the patient from using any medications that might increase her risk of tachyarrhythmia such as decongestants and caffeine.   4.  Assuming the patient does not have additional issues, I anticipate she will go home tomorrow.   5.  If she has recurrence of her atrial fibrillation, one could consider adding a regular dose of diltiazem to her regimen.         PERRY NAQVI MD             D: 02/15/2017 00:34   T: 02/15/2017 02:14   MT: CAROLINA#[id number]      Name:     EDISON WATTS   MRN:      [id number]        Account:      [id number]   :      1932           Admitted:     [id number]      Document: [id number]       cc: Kenneth Pallas MD

## 2017-02-15 NOTE — PLAN OF CARE
Problem: Discharge Planning  Goal: Discharge Planning (Adult, OB, Behavioral, Peds)  PRIMARY DIAGNOSIS: A-fib RVR  Primary Symptoms: Weakness, Fatigue     OUTPATIENT/OBSERVATION GOALS TO BE MET BEFORE DISCHARGE:     1. Orthostatic symptoms (BP decrease or HR increase with patient upright)?  N/A  2. Vital Signs stable? Yes  3. Documented urine output: Yes  4. Tolerating PO fluid: Yes  5. Symptoms improved: Yes  6. Labs WNL? Yes  7. ADLs back to baseline?  No  8. Activity and level of assistance: Up with standby assistance.  9. Pain status: Improved with use of alternative comfort measures i.e.: positioning  10. Barriers to discharge noted Yes, pending AM labs     Interpretation of rhythm per telemetry tech:     Is patient a falls risk? Yes  Reason for falls risk:  Mobility  Falls armband on?  YES  Within Arm's Reach? Yes   Bed alarm turned on?   YES  Personal alarm in place and turned on?   No        Is patient a falls risk? Yes  Falls armband on?  YES  Within Arm's Reach? Yes   Bed alarm turned on?   YES    Personal alarm in place and turned on?   YES

## 2017-02-15 NOTE — PROGRESS NOTES
Pt walked with walker and gait belt for about 100 feet. Pt was tired, walking very slow. No dizziness or SOB.

## 2017-02-15 NOTE — PLAN OF CARE
Problem: Discharge Planning  Goal: Discharge Planning (Adult, OB, Behavioral, Peds)  OBSERVATION patient IN TIME: 2341 on 2/14/17  ROOM # 220     Living Situation (if not independent, order SW consult): Pt from [facility]  Facility name:     Activity level at baseline: Indep/walker  Activity level on admit: Assist x 1/omar steady     Is patient a falls risk? Yes  Reason for falls risk:  Mobility  Falls armband on? YES  Within Arm's Reach? Yes   Bed alarm turned on?   YES  Personal alarm in place and turned on?   No     Patient registered to observation; given Patient Bill of Rights; given the opportunity to ask questions about observation status and their plan of care.  Patient has been oriented to the observation room, bathroom, and call light is in place.     :

## 2017-02-15 NOTE — PLAN OF CARE
Problem: Discharge Planning  Goal: Discharge Planning (Adult, OB, Behavioral, Peds)  Outcome: No Change  PRIMARY DIAGNOSIS: Afib with RVR, weakness  OUTPATIENT/OBSERVATION GOALS TO BE MET BEFORE DISCHARGE:     1. Negative Serial Troponin Yes  2. Resolution of chest pain Yes  3. Pain status: Pain free.  4. Negative stress test No  5. Stable vital signs Yes  6. ADLs back to baseline?  No - pt. Up with assist of 1 and 2WW.  Pt. Needed some assistance moving from sitting to standing position.  Pt. Moved with assist of 1 and walker into bathroom and to chair for breakfast.  Pt. Moved very slowly. When asked if she felt weak and if she would be able to go home with the way she is moving, pt. Stated that she's feeling stronger as she is moving  7. Activity and level of assistance: Ambulating independently.  8. Barriers to discharge noted Yes - questioning whether pt. Is safe with ambulation  9.Interpretation of rhythm per telemetry tech:SR with slight ST depression at 0706 per report     Is patient a falls risk? Yes  Reason for falls risk:  Mobility  Falls armband on?  YES  Within Arm's Reach? Yes   Bed alarm turned on?   YES  Personal alarm in place and turned on?   YES     Pt. Answering questions appropriately, but she is very slow to respond.  Slow to tell RN even what she wanted for breakfast.  Pt. Reports her weakness feels about the same, but that she feels stronger as she is moving. Will continue to walk pt. Pt. Still needing assist of 1 and walker.  Pt. Reports she uses 4 wheel walker at baseline at home.

## 2017-02-15 NOTE — ED NOTES
Patient arrives via EMS for evaluation of increased weakness, especially of the upper legs. She was hospitalized and released on Sunday. She notes pain resolved but weakness worsened. Patient is AOX4, ABC's intact.

## 2017-02-15 NOTE — PROGRESS NOTES
"[facility] RCAT     Date:2/15/2017  Admission Dx:AFib with RVR  Pulmonary History:Asthma  Home Nebulizer/MDI Use:Alb mdi, Flovent   Home Oxygen:No  Acuity Level (RCAT flow sheet):4  Aerosol Therapy initiated:Albuterol Q4 prn      Pulmonary Hygiene initiated:Deep breath and coughing techniques      Volume Expansion initiated:IS      Current Oxygen Requirements:RA  Current SpO2:94%    Re-evaluation date:2/18/2017    Patient Education:Education performed with patient in regards to indications/benefits of bronchodilators. Will continue to educate with patient as needed.         See \"RT Assessments\" flow sheet for patient assessment scoring and Acuity Level Details.             "

## 2017-02-15 NOTE — ED PROVIDER NOTES
History     Chief Complaint:  Generalized Weakness    HPI   History somewhat limited as patient is a poor historian.    Chuyita Gayle is an 84 year old female who presents with generalized weakness. The patient has a history significant for a-fib and is currently on Coumadin. She was recently seen here in the hospital on 2/10 until 2/13 for left hip pain and generalized weakness as well as bilateral leg weakness, and was subsequently admitted for several days until yesterday as she was unable to transfer or move without assistance. It was attributed to severe osteoarthritis.  She was started on Robitussin DM and Zyrtec D, and halted her Naproxen. Her hip pain mostly resolved but she has continued to feel generally weak and has had difficulty walking around her assisted living residence. Despite resolution of pain, she has continued to have weakness to the point where she feels that she cannot get out of bed and subsequently called EMS to be brought here for evaluation. Here, she notes that she has had a dry cough for several weeks, with some blood tinged mucous noted during the mornings only. She has otherwise received her flu vaccination and denies any chest pain, abdominal pain, vomiting, diarrhea, or constipation. She complains of generalized weakness and difficulty ambulating at home. She has not had any measured fevers or chills.    Allergies:  Celecoxib  Nabumetone  Sulfa Drugs  Vioxx [Rofecoxib]      Medications:    Atenolol  Flovent  Gabapentin  Omeprazole  Warfarin  Ativan    Past Medical History:    A-fib  Hip pain  Osteoarthritis     Past Surgical History:    History reviewed. No pertinent surgical history.     Family History:    History reviewed. No pertinent family history.      Social History:  Patient presents alone via EMS  Marital Status:        Review of Systems   Constitutional: Positive for fatigue. Negative for chills and fever.   Respiratory: Positive for cough. Negative for  shortness of breath.    Cardiovascular: Negative for chest pain.   Gastrointestinal: Negative for nausea and vomiting.   Musculoskeletal: Positive for arthralgias.   Neurological: Positive for weakness. Negative for light-headedness, numbness and headaches.   All other systems reviewed and are negative.      Physical Exam     Patient Vitals for the past 24 hrs:   BP Temp Temp src Pulse Heart Rate Resp SpO2   02/14/17 2200 - - - - 128 - 93 %   02/14/17 2145 - - - - 145 - 94 %   02/14/17 2130 - - - - 133 - 93 %   02/14/17 2100 (!) 159/106 - - - - - 96 %   02/14/17 2045 144/80 - - - - - -   02/14/17 2030 (!) 138/92 - - - - - 96 %   02/14/17 2015 169/90 - - - - - 96 %   02/14/17 2000 (!) 167/98 - - - - - 97 %   02/14/17 1953 (!) 177/108 97.9  F (36.6  C) Oral 129 - 16 96 %      Physical Exam  General:   Well-nourished   Speaking in full sentences   Answers questions appropriately  Eyes:   Conjunctiva without injection or scleral icterus   PERRL  ENT:   Moist mucous membranes   Posterior oropharynx clear without erythema or exudate   Nares patent   Pinnae normal  Neck:   Full ROM   No stiffness appreciated  Resp:   Diminished at bases R>L   No crackles, wheezing or audible rubs   Good air movement  CV:    Irregularly irregular rate and rhythm   S1 and S2 present   No murmur, gallop or rub  GI:   BS present   Abdomen soft without distention   Non-tender to light and deep palpation   No guarding or rebound tenderness  Skin:   Warm, dry, well perfused   No rashes or open wounds on exposed skin  MSK:   Moves all extremities   No focal deformities   Trace LE edema   Able to log roll BLE  Neuro:   Alert   Answers questions appropriately   Moves all extremities equally  Psych:   Normal affect, normal mood    Emergency Department Course     ECG (20:29:54):  Rate 132 bpm. NV interval *. QRS duration 86. QT/QTc 336/497. P-R-T axes * 26 -40. Atrial fibrillation with RVR. ST depression, consider subendocardial injury. Abnormal QRS-T  angle, consider primary T wave abnormality. Abnormal ECG. Interpreted at 2013 by Sean Buckner MD.     Imaging:  Radiographic findings were communicated with the patient who voiced understanding of the findings.    X-ray Chest, 2 views:  No acute pulmonary disease. Borderline cardiomegaly. No  pulmonary edema.  Result per radiology.     Laboratory:  Troponin: <0.015   CBC: WNL (WBC 7.4, HGB 12.9, )    CMP: Glucose 107 (H), o/w WNL (Creatinine 0.77)   PTT: 52 (H)    INR: 2.29 (H)  Lactate: 1.1    UA: leukocyte esterase large, WBC 35 (H), Ketone 5, Bacteria few, Squamous Epithelial 5 (H), o/w negative  Blood Culture x2: Pending   Urine culture: Pending    Interventions:  2053 - NS 1L IV Bolus   2209 - Cardizem 10 mg IV  2214 - Tylenol 1,000 mg PO  2224 - Cardizem 10 mg IV    Emergency Department Course:  The patient arrived in the emergency department via EMS.   Past medical records, nursing notes, and vitals reviewed.  0842: I performed an exam of the patient and obtained history, as documented above.    IV inserted and blood drawn. The patient was placed on continuous cardiac monitoring and pulse oximetry.   The patient was sent for a x-ray while in the emergency department, findings above.     2228: Findings and plan explained to the Patient who consents to admission.     2231: Discussed the patient with Dr. Townsend, who will admit the patient to a medical bed for further monitoring, evaluation, and treatment.      2321: Patient converted into normal sinus rhythm here. On recheck she reports feeling much better.    Impression & Plan      Medical Decision Making:  Chuyita Gayle is an 84 year old female with a past medical history significant for osteoarthritis, atrial fibrillation, and chronic left hip pain who is presenting to the ED for evaluation of generalized weakness. History is limited from the patient as she does exhibit some confusion. Prior records were reviewed including hospitalization  from 2/10 to 2/13 for left hip pain. Please refer to associated documentation in her other MRN for additional information. Here in the ED, the patient reports ongoing generalized weakness. At this point, the exact cause of the patient s weakness is not entirely clear. It is possible that this may be related to deconditioning in light of recent hospital stay. By history, she reports cough with associated green sputum, which raises concern for associated bronchitis. No focal findings of acute infectious process is identified on chest X-ray. Laboratory studies reveal normal WBC and lactate of 1.1. Metabolic function is grossly unremarkable. INR is therapeutic at 2.29. Urinalysis reveals suggestion of infection with large leukocyte esterase and 35 WBC, but there is also contamination with squamous cells and the patient notes no urinary symptoms; at this point we will hold on antibiotics as this is most consistent with asymptomatic bacteriuria. Urine and blood cultures were obtained and are presently pending. The patient currently reports no left hip pain. I am able to log roll her leg and I have low suspicion for concurrent septic arthritis.  EKG reveals atrial fibrillation with RVR as well as some subtle STD (most likely rate related).  She notes no associated chest pain nor chest pressure.  After 2 doses of diltiazem, patient converted to sinus rhythm, and symptomatically noted improvement in symptoms.  Question if this may be related to pseudoephedrine within Zyrtec-D. In light of the patient s progressively weakness, she will be admitted to the hospitalist service under the care of Dr. Townsend for further treatment and care.     Diagnosis:    ICD-10-CM    1. Generalized weakness R53.1    2. Atrial fibrillation with RVR (H) I48.91    3. Cough R05    4. Asymptomatic bacteriuria R82.71      Nikos Gayle  2/14/2017   [facility] EMERGENCY DEPARTMENT  I, Nikos Gayle, am serving as a scribe at 8:42 PM  on 2/14/2017 to document services personally performed by Sean Buckner MD based on my observations and the provider's statements to me.       Sean Buckner MD  02/15/17 0837       Sean Buckner MD  02/15/17 0838

## 2017-02-19 NOTE — ED AVS SNAPSHOT
[facility] Emergency Department    [address]    [facility] [address]    Phone:  [phone]    Fax:  [phone]                                       Chuyita Gayle   MRN: [id number]    Department:  [facility] Emergency Department   Date of Visit:  2/19/2017           Patient Information     Date Of Birth          7/24/1932        Your diagnoses for this visit were:     Fall, initial encounter     Closed head injury, initial encounter     Abnormal CT scan of head        You were seen by Mitra Layne MD.      Follow-up Information     Follow up with [facility] In 1 week.    Contact information:    [address] [facility] [address] [phone]        Discharge Instructions       Discharge Instructions  Head Injury    You have been seen today for a head injury. You were checked for serious problems, like bleeding on the brain, but these problems cannot always be found right away.  Due to this risk, you should not be alone for 24 hours after your injury.  Follow up with your regular physician in 2 days. If you are taking a blood thinner, such as aspirin, Pradaxa  (dabigatran), Coumadin  (warfarin), or Plavix  (clopidogrel), you are at especially high risk for immediate or delayed bleeding, and need to re-check with a physician in 24 hours, or sooner if any of the symptoms below happen.     Return to the Emergency Department if:    You are confused, have amnesia, or you are not acting right.    Your headache gets worse or you start to have a really bad headache even with your recommended treatment plan.    You vomit more than once.    You have a convulsion or seizure.    You have trouble walking.    You have weakness or paralysis in an arm or a leg.    You have blood or fluid coming from your ears or nose.    You have new symptoms or anything that worries you.    Sleeping:  It is okay for you to sleep, but someone should wake you  up as instructed by your doctor, and someone should check on you at your usual time to wake up.     Activity:    Do not drive for at least 24 hours.    Do not drive if you have dizzy spells or trouble concentrating, or remembering things.    Do not return to any contact sports until cleared by your regular doctor.     Follow-up:  It is very important that you make an appointment with your clinic and go to the appointment.  If you do not follow-up with your regular doctor, it may result in missing an important development which could result in permanent injury or disability and/or lasting pain.  If there is any problem keeping your appointment, call your doctor or return to the Emergency Department.    MORE INFORMATION:    Concussion:  A concussion is a minor head injury that may cause temporary problems with the way your brain works.  Some symptoms include:  confusion, amnesia, nausea and vomiting, dizziness, fatigue, memory or concentration problems, irritability and sleep problems.    CT Scans: Your evaluation today may have included a CT scan (CAT scan) to look for things like bleeding or a skull fracture (break).  CT scans involve radiation and too many CT scans can cause serious health problems like cancer, especially in children.  Because of this, your doctor may not have ordered a CT scan today if they think you are at low risk for a serious or life threatening problem.    If you were given a prescription for medicine here today, be sure to read all of the information (including the package insert) that comes with your prescription.  This will include important information about the medicine, its side effects, and any warnings that you need to know about.  The pharmacist who fills the prescription can provide more information and answer questions you may have about the medicine.  If you have questions or concerns that the pharmacist cannot address, please call or return to the Emergency Department.     Opioid  Medication Information    Pain medications are among the most commonly prescribed medicines, so we are including this information for all our patients. If you did not receive pain medication or get a prescription for pain medicine, you can ignore it.     You may have been given a prescription for an opioid (narcotic) pain medicine and/or have received a pain medicine while here in the Emergency Department. These medicines can make you drowsy or impaired. You must not drive, operate dangerous equipment, or engage in any other dangerous activities while taking these medications. If you drive while taking these medications, you could be arrested for DUI, or driving under the influence. Do not drink any alcohol while you are taking these medications.     Opioid pain medications can cause addiction. If you have a history of chemical dependency of any type, you are at a higher risk of becoming addicted to pain medications.  Only take these prescribed medications to treat your pain when all other options have been tried. Take it for as short a time and as few doses as possible. Store your pain pills in a secure place, as they are frequently stolen and provide a dangerous opportunity for children or visitors in your house to start abusing these powerful medications. We will not replace any lost or stolen medicine.  As soon as your pain is better, you should flush all your remaining medication.     Many prescription pain medications contain Tylenol  (acetaminophen), including Vicodin , Tylenol #3 , Norco , Lortab , and Percocet .  You should not take any extra pills of Tylenol  if you are using these prescription medications or you can get very sick.  Do not ever take more than 3000 mg of acetaminophen in any 24 hour period.    All opioids tend to cause constipation. Drink plenty of water and eat foods that have a lot of fiber, such as fruits, vegetables, prune juice, apple juice and high fiber cereal.  Take a laxative if  you don t move your bowels at least every other day. Miralax , Milk of Magnesia, Colace , or Senna  can be used to keep you regular.      Remember that you can always come back to the Emergency Department if you are not able to see your regular doctor in the amount of time listed above, if you get any new symptoms, or if there is anything that worries you.            24 Hour Appointment Hotline       To make an appointment at any [facility], call [phone] ([phone]). If you don't have a family doctor or clinic, we will help you find one. [facility] are conveniently located to serve the needs of you and your family.             Review of your medicines      Our records show that you are taking the medicines listed below. If these are incorrect, please call your family doctor or clinic.        Dose / Directions Last dose taken    * acetaminophen 500 MG tablet   Commonly known as:  TYLENOL   Dose:  1000 mg        Take 1,000 mg by mouth 2 times daily   Refills:  0        * acetaminophen 500 MG tablet   Commonly known as:  TYLENOL   Dose:  1000 mg   Quantity:  100 tablet        Take 2 tablets (1,000 mg) by mouth 3 times daily   Refills:  0        * albuterol (2.5 MG/3ML) 0.083% neb solution   Dose:  1 vial        Take 1 vial by nebulization every 4 hours as needed for shortness of breath / dyspnea or wheezing   Refills:  0        * albuterol 108 (90 BASE) MCG/ACT Inhaler   Commonly known as:  PROAIR HFA/PROVENTIL HFA/VENTOLIN HFA   Dose:  1 puff        Inhale 1 puff into the lungs every 4 hours as needed for shortness of breath / dyspnea or wheezing   Refills:  0        * albuterol 108 (90 BASE) MCG/ACT Inhaler   Commonly known as:  PROAIR HFA/PROVENTIL HFA/VENTOLIN HFA   Dose:  2 puff        Inhale 2 puffs into the lungs every 4 hours as needed   Refills:  0        * albuterol (2.5 MG/3ML) 0.083% neb solution   Dose:  1 vial        Take 1 vial by nebulization every 4 hours as needed for shortness  of breath / dyspnea or wheezing   Refills:  0        * atenolol 25 MG tablet   Commonly known as:  TENORMIN   Dose:  25 mg        Take 25 mg by mouth 2 times daily   Refills:  0        * atenolol 50 MG tablet   Commonly known as:  TENORMIN   Dose:  50 mg   Quantity:  30 tablet        Take 1 tablet (50 mg) by mouth 2 times daily   Refills:  1        * ATIVAN 0.5 MG tablet   Dose:  0.5 mg   Generic drug:  LORazepam        Take 0.5 mg by mouth 2 times daily as needed for anxiety   Refills:  0        * LORazepam 0.5 MG tablet   Commonly known as:  ATIVAN   Dose:  0.5 mg   Quantity:  30 tablet        Take 1 tablet (0.5 mg) by mouth 2 times daily as needed for anxiety   Refills:  0        * BREO ELLIPTA 100-25 MCG/INH oral inhaler   Dose:  1 puff   Generic drug:  fluticasone-vilanterol        Inhale 1 puff into the lungs daily   Refills:  0        * BREO ELLIPTA 100-25 MCG/INH oral inhaler   Dose:  1 puff   Generic drug:  fluticasone-vilanterol        Inhale 1 puff into the lungs daily   Refills:  0        cetirizine-psuedoePHEDrine 5-120 MG per 12 hr tablet   Commonly known as:  zyrTEC-D   Dose:  1 tablet   Quantity:  28 tablet        Take 1 tablet by mouth every 12 hours as needed for allergies   Refills:  0        * cholecalciferol 1000 UNIT tablet   Commonly known as:  vitamin D   Dose:  1000 Units        Take 1,000 Units by mouth daily   Refills:  0        * Vitamin D (Cholecalciferol) 1000 UNITS Caps   Dose:  1000 Units        Take 1,000 Units by mouth daily   Refills:  0        * Fish Oil 1200 MG Caps   Dose:  1 capsule        Take 1 capsule by mouth every morning   Refills:  0        * Fish Oil 1200 MG Caps   Dose:  1 capsule        Take 1 capsule by mouth daily   Refills:  0        * fluticasone 50 MCG/ACT spray   Commonly known as:  FLONASE   Dose:  1 spray        Spray 1 spray into both nostrils daily as needed for rhinitis or allergies   Refills:  0        * fluticasone 50 MCG/ACT spray   Commonly known as:   FLONASE   Dose:  1 spray        Spray 1 spray into both nostrils daily as needed for rhinitis or allergies   Refills:  0        * gabapentin 300 MG capsule   Commonly known as:  NEURONTIN   Dose:  600 mg        Take 600 mg by mouth At Bedtime   Refills:  0        * gabapentin 300 MG capsule   Commonly known as:  NEURONTIN   Dose:  600 mg        Take 600 mg by mouth At Bedtime   Refills:  0        * guaiFENesin-dextromethorphan 100-10 MG/5ML syrup   Commonly known as:  ROBITUSSIN DM   Dose:  5 mL        Take 5 mLs by mouth every 4 hours as needed for cough   Refills:  0        * guaiFENesin-dextromethorphan 100-10 MG/5ML syrup   Commonly known as:  ROBITUSSIN DM   Dose:  5-10 mL   Quantity:  354 mL        Take 5-10 mLs by mouth every 4 hours as needed for cough   Refills:  0        MULTIVITAMIN ADULT PO   Dose:  1 tablet        Take 1 tablet by mouth daily   Refills:  0        multivitamin, therapeutic Tabs tablet   Dose:  1 tablet        Take 1 tablet by mouth daily   Refills:  0        naproxen 500 MG tablet   Commonly known as:  NAPROSYN   Dose:  500 mg        Take 500 mg by mouth 2 times daily as needed for moderate pain   Refills:  0        * priLOSEC 20 MG CR capsule   Dose:  20 mg   Generic drug:  omeprazole        Take 20 mg by mouth daily   Refills:  0        * omeprazole 20 MG CR capsule   Commonly known as:  priLOSEC   Dose:  20 mg        Take 20 mg by mouth daily   Refills:  0        * senna-docusate 8.6-50 MG per tablet   Commonly known as:  SENOKOT-S;PERICOLACE   Dose:  1 tablet        Take 1 tablet by mouth 2 times daily as needed for constipation   Refills:  0        * senna-docusate 8.6-50 MG per tablet   Commonly known as:  SENOKOT-S;PERICOLACE   Dose:  1 tablet        Take 1 tablet by mouth 2 times daily as needed for constipation   Refills:  0        * VALTREX 500 MG tablet   Dose:  500 mg   Generic drug:  valACYclovir        Take 500 mg by mouth 2 times daily as needed For 3 days prn   Refills:   0        * VALTREX 500 MG tablet   Dose:  500 mg   Generic drug:  valACYclovir        Take 500 mg by mouth 2 times daily as needed (x3 days for cold sores)   Refills:  0        * WARFARIN SODIUM PO        1.25 mg MWF and 2.5 mg ROW   Refills:  0        * warfarin 2.5 MG tablet   Commonly known as:  COUMADIN        Take by mouth daily 2.5mg on Sun,Tues,Thurs,Sat, & 1.25mg on Mon,Wed,Fri   Refills:  0        * Notice:  This list has 30 medication(s) that are the same as other medications prescribed for you. Read the directions carefully, and ask your doctor or other care provider to review them with you.            Procedures and tests performed during your visit     Basic metabolic panel    CBC with platelets differential    CT Cervical Spine w/o Contrast    CT Head w/o Contrast    EKG 12 lead    INR    UA with Microscopic      Orders Needing Specimen Collection     None      Pending Results     Date and Time Order Name Status Description    2/19/2017 1823 EKG 12 lead Preliminary             Pending Culture Results     No orders found from 2/17/2017 to 2/20/2017.             Test Results from your hospital stay     2/19/2017  7:04 PM - Interface, [facility] Results      Component Results     Component Value Ref Range & Units Status    WBC 6.9 4.0 - 11.0 10e9/L Final    RBC Count 4.02 3.8 - 5.2 10e12/L Final    Hemoglobin 12.2 11.7 - 15.7 g/dL Final    Hematocrit 38.5 35.0 - 47.0 % Final    MCV 96 78 - 100 fl Final    MCH 30.3 26.5 - 33.0 pg Final    MCHC 31.7 31.5 - 36.5 g/dL Final    RDW 13.5 10.0 - 15.0 % Final    Platelet Count 337 150 - 450 10e9/L Final    Diff Method Automated Method  Final    % Neutrophils 56.4 % Final    % Lymphocytes 31.2 % Final    % Monocytes 7.6 % Final    % Eosinophils 3.8 % Final    % Basophils 0.4 % Final    % Immature Granulocytes 0.6 % Final    Nucleated RBCs 0 0 /100 Final    Absolute Neutrophil 3.9 1.6 - 8.3 10e9/L Final    Absolute Lymphocytes 2.1 0.8 - 5.3 10e9/L Final    Absolute  Monocytes 0.5 0.0 - 1.3 10e9/L Final    Absolute Eosinophils 0.3 0.0 - 0.7 10e9/L Final    Absolute Basophils 0.0 0.0 - 0.2 10e9/L Final    Abs Immature Granulocytes 0.0 0 - 0.4 10e9/L Final    Absolute Nucleated RBC 0.0  Final         2/19/2017  7:17 PM - Interface, [facility] Results      Component Results     Component Value Ref Range & Units Status    Sodium 138 133 - 144 mmol/L Final    Potassium 3.8 3.4 - 5.3 mmol/L Final    Chloride 105 94 - 109 mmol/L Final    Carbon Dioxide 24 20 - 32 mmol/L Final    Anion Gap 9 3 - 14 mmol/L Final    Glucose 173 (H) 70 - 99 mg/dL Final    Urea Nitrogen 17 7 - 30 mg/dL Final    Creatinine 0.99 0.52 - 1.04 mg/dL Final    GFR Estimate 53 (L) >60 mL/min/1.7m2 Final    Non  GFR Calc    GFR Estimate If Black 64 >60 mL/min/1.7m2 Final    African American GFR Calc    Calcium 9.0 8.5 - 10.1 mg/dL Final         2/19/2017  7:12 PM - Interface, [facility] Results      Component Results     Component Value Ref Range & Units Status    INR 2.25 (H) 0.86 - 1.14 Final         2/19/2017  7:45 PM - Interface, Radiant Ib      Narrative     CT SCAN OF THE HEAD WITHOUT CONTRAST   2/19/2017 7:33 PM     HISTORY: Trauma from a fall.    TECHNIQUE:  Axial images of the head and coronal reformations without  IV contrast material. Radiation dose for this scan was reduced using  automated exposure control, adjustment of the mA and/or kV according  to patient size, or iterative reconstruction technique.    COMPARISON: None.    FINDINGS:  There is generalized atrophy of the brain.  There is low  attenuation in the white matter of the cerebral hemispheres consistent  with sequelae of small vessel ischemic disease. 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 Lateral and third  ventricles are dilated somewhat more than the sulci over the  convexities, with flattening of the sulci superiorly, raising the  possibility of normal pressure hydrocephalus. There is no  evidence of  intracranial hemorrhage, mass, acute infarct or anomaly.     The visualized portions of the sinuses and mastoids appear normal.  There is no evidence of trauma.        Impression     IMPRESSION:   1. No evidence of acute trauma.  2. Atrophy of the brain.  White matter changes consistent with  sequelae of small vessel ischemic disease.  3. The dilatation of lateral and third ventricles out of proportion to  the sulci especially over the convexities raises the possibility of  normal pressure hydrocephalus.    JAVIER SILVERMAN MD         2/19/2017  7:46 PM - Interface, Radiant Ib      Narrative     CT CERVICAL SPINE WITHOUT CONTRAST   2/19/2017 7:33 PM     HISTORY: Patient fell. Neck pain from trauma.     TECHNIQUE: Axial images of the cervical spine were obtained without  intravenous contrast. Multiplanar reformations were performed.   Radiation dose for this scan was reduced using automated exposure  control, adjustment of the mA and/or kV according to patient size, or  iterative reconstruction technique.    COMPARISON: None.    FINDINGS: There is no evidence of fracture. There is multilevel  degenerative disc disease and degenerative facet arthropathy. There is  severe degeneration at the medial atlantoaxial joint with erosion of  the odontoid. There is reversal of lordosis from C3-C6. Multiple  levels of mild to moderate spinal canal and foraminal stenosis are  noted. Calcified granulomas noted in the upper lobe and left lung.  Paraspinous soft tissues are unremarkable except for arterial  calcifications.        Impression     IMPRESSION:  1. No evidence of acute trauma.  2. Extensive degenerative changes and multiple levels of mild to  moderate spinal canal and foraminal stenosis.     JAVIER SILVERMAN MD         2/19/2017  8:47 PM - Interface, [facility]il[facility] Results      Component Results     Component Value Ref Range & Units Status    Color Urine Yellow  Final    Appearance Urine Clear  Final    Glucose Urine  Negative NEG mg/dL Final    Bilirubin Urine Negative NEG Final    Ketones Urine Negative NEG mg/dL Final    Specific Gravity Urine 1.011 1.003 - 1.035 Final    Blood Urine Negative NEG Final    pH Urine 6.5 5.0 - 7.0 pH Final    Protein Albumin Urine Negative NEG mg/dL Final    Urobilinogen mg/dL Normal 0.0 - 2.0 mg/dL Final    Nitrite Urine Negative NEG Final    Leukocyte Esterase Urine Negative NEG Final    Source Catheterized Urine  Final    WBC Urine <1 0 - 2 /HPF Final    RBC Urine <1 0 - 2 /HPF Final                Clinical Quality Measure: Blood Pressure Screening     Your blood pressure was checked while you were in the emergency department today. The last reading we obtained was  BP: 154/85 . Please read the guidelines below about what these numbers mean and what you should do about them.  If your systolic blood pressure (the top number) is less than 120 and your diastolic blood pressure (the bottom number) is less than 80, then your blood pressure is normal. There is nothing more that you need to do about it.  If your systolic blood pressure (the top number) is 120-139 or your diastolic blood pressure (the bottom number) is 80-89, your blood pressure may be higher than it should be. You should have your blood pressure rechecked within a year by a primary care provider.  If your systolic blood pressure (the top number) is 140 or greater or your diastolic blood pressure (the bottom number) is 90 or greater, you may have high blood pressure. High blood pressure is treatable, but if left untreated over time it can put you at risk for heart attack, stroke, or kidney failure. You should have your blood pressure rechecked by a primary care provider within the next 4 weeks.  If your provider in the emergency department today gave you specific instructions to follow-up with your doctor or provider even sooner than that, you should follow that instruction and not wait for up to 4 weeks for your follow-up visit.       "  Thank you for choosing [facility]       Thank you for choosing [facility] for your care. Our goal is always to provide you with excellent care. Hearing back from our patients is one way we can continue to improve our services. Please take a few minutes to complete the written survey that you may receive in the mail after you visit with us. Thank you!        [employer] Information     [employer] lets you send messages to your doctor, view your test results, renew your prescriptions, schedule appointments and more. To sign up, go to [email or web address] . Click on \"Log in\" on the left side of the screen, which will take you to the Welcome page. Then click on \"Sign up Now\" on the right side of the page.     You will be asked to enter the access code listed below, as well as some personal information. Please follow the directions to create your username and password.     Your access code is: [email or web address]  Expires: 2017 10:12 AM     Your access code will  in 90 days. If you need help or a new code, please call your [facility] clinic or [phone].        Care EveryWhere ID     This is your Care EveryWhere ID. This could be used by other organizations to access your [facility] medical records  [id number]        After Visit Summary       This is your record. Keep this with you and show to your community pharmacist(s) and doctor(s) at your next visit.                  "

## 2017-02-19 NOTE — ED AVS SNAPSHOT
[facility] Emergency Department    [address]    [facility] [address]    Phone:  [phone]    Fax:  [phone]                                       Chuyita Gayle   MRN: [id number]    Department:  [facility] Emergency Department   Date of Visit:  2/19/2017           After Visit Summary Signature Page     I have received my discharge instructions, and my questions have been answered. I have discussed any challenges I see with this plan with the nurse or doctor.    ..........................................................................................................................................  Patient/Patient Representative Signature      ..........................................................................................................................................  Patient Representative Print Name and Relationship to Patient    ..................................................               ................................................  Date                                            Time    ..........................................................................................................................................  Reviewed by Signature/Title    ...................................................              ..............................................  Date                                                            Time

## 2017-02-20 NOTE — ED NOTES
Writer spoke with the RA at [facility] in [place].    RA is aware pt will be returning home by ambulance and will let them in the door.  Also aware to talk with pt's nurse with the facility to set up follow up care with a neurologist.

## 2017-02-20 NOTE — ED PROVIDER NOTES
"  History     Chief Complaint:  Fall and Fatigue      HPI   Chuyita Gayle is a 84 year old female with a history of atrial fibrillation currently on coumadin who presents via EMS from assisted living after a fall. The patient reports today she was reaching for the [employer] while holding onto the refrigerator door when it swung open causing her to fall. She did hit her head but denies loss of consciousness. Currently she complains of a headache. The patient feels she has been becoming gradually weaker over the past several weeks. She denies any injuries including hip pain. The patient has been eating well. The patient denies nausea, vomiting, chest pain, abdominal pain, dysuria, black/bloody stools, or any associated symptoms. Per chart review, the patient has been admitted twice from the emergency department in the past 9 days.     Allergies:  Celecoxib  Nabumetone  Sulfa Drugs  Vioxx [Rofecoxib]       Medications:    Atenolol  Flovent  Gabapentin  Omeprazole  Warfarin  Ativan     Past Medical History:    A-fib  Hip pain  Osteoarthritis      Past Surgical History:   History reviewed. No pertinent surgical history.      Family History:   History reviewed. No pertinent family history.      Social History:  Patient presents alone via EMS  Marital Status:       Review of Systems   Cardiovascular: Negative for chest pain.   Gastrointestinal: Negative for abdominal pain, blood in stool, nausea and vomiting.   Genitourinary: Negative for dysuria.   Neurological: Positive for headaches. Negative for syncope.   All other systems reviewed and are negative.    Physical Exam   First Vitals:  BP: (!) 178/109  Pulse: 71  Heart Rate: 71  Temp: 97.8  F (36.6  C)  Resp: 18  Height: 157.5 cm (5' 2\")  Weight: 68 kg (150 lb)  SpO2: 96 %    Physical Exam  Constitutional: The patient is oriented to person, place, and time. Alert and cooperative. Generalized weakness.  HENT:   Right Ear: External ear normal.   Left Ear: " External ear normal.   Nose: Nose normal.   Mouth/Throat: Uvula is midline, oropharynx is clear and moist and mucous membranes are normal. No posterior oropharyngeal edema or erythema.   Eyes: Conjunctivae, EOM and lids are normal. Pupils are equal, round, and reactive to light.   Neck: Trachea normal. Normal range of motion. Neck supple.   Cardiovascular: Normal rate, regular rhythm, normal heart sounds, and intact distal pulses.    Pulmonary/Chest: Effort normal and breath sounds equal bilaterally. No crackles or wheezing.   Abdominal: Soft. Bowel sounds are normal. No tenderness. No rebound and no guarding.   Musculoskeletal: Normal range of motion.  No extremity tenderness or edema. No CVA tenderness. No spinal tenderness.  Lymphadenopathy:  No cervical adenopathy.   Neurological: Alert and oriented. Normal strength. No cranial nerve deficit or sensory deficit.   Skin: Skin is dry. No rash noted.   Psychiatric: Normal mood and affect.      Emergency Department Course   ECG:  Completed at 1826.  Read at 1830.   Rate 73 bpm. NJ interval 168. QRS duration 72. QT/QTc 402/442. P-R-T axes 84 34 32. normal sinus rhythm, nonspecific ST abnormality, abnormal ECG     Imaging:  Radiographic findings were communicated with the patient who voiced understanding of the findings.    CT Cervical Spine w/o contrast:   1. No evidence of acute trauma.  2. Extensive degenerative changes and multiple levels of mild to  moderate spinal canal and foraminal stenosis.   Results per radiology.     CT Head w/o contrast:  1. No evidence of acute trauma.  2. Atrophy of the brain.  White matter changes consistent with  sequelae of small vessel ischemic disease.  3. The dilatation of lateral and third ventricles out of proportion to  the sulci especially over the convexities raises the possibility of  normal pressure hydrocephalus.  Results per radiology.     Laboratory:  UA with Micro: WNL  CBC: WNL (WBC 6.9, HGB 12.2, )   BMP:   (H), ow wnl (Creat 0.99)  INR: 2.25    Emergency Department Course:  Nursing notes and vitals reviewed.  I performed an exam of the patient as documented above.     The above imaging study(s) and ECG were ordered with results noted above.     Blood drawn and urine collected. This was sent to the lab for further testing, results above.     2105: Discussed the patient with Dr. Santana of neurology.     Findings and plan explained to the Patient. Patient discharged home with instructions regarding supportive care, medications, and reasons to return. The importance of close follow-up was reviewed.    Impression & Plan    Medical Decision Making:  Chuyita Gayle is a 84 year old female who lives in assisted living. She was reaching from the refrigerator, lost her balance, and fell. She did hit her head but not have loss of consciousness. Due to the injury and her being on coumadin, a CT of the head and neck were done which did not show acute trauma. There was concern that the patient may possibly have normal pressure hydrocephalus. She has been feeling more generalized weakness over the last couple of week so laboratory evaluation was done which is unremarkable with no evidence of urinary tract infection or lab abnormality. INR was 2.25. The patient was feeling improved and felt she could be discharged. She will be going back via ambulance and assisted living will help her back into her facility. I did talk to Dr. Santana of neurology who recommended outpatient follow up in the next 1-2 weeks for further evaluation of the CT findings with possible normal pressure hydrocephalus. She was informed and should make follow up appointment.     Diagnosis:    ICD-10-CM    1. Fall, initial encounter W19.XXXA    2. Closed head injury, initial encounter S09.90XA    3. Abnormal CT scan of head R93.0        Disposition:  Discharged.    Ever WERNER, am serving as a scribe at 10:00 PM on 2/19/2017 to document services personally  performed by Dr. Layne, based on my observations and the provider's statements to me.    [facility] EMERGENCY DEPARTMENT       Mitra Layne MD  02/20/17 0121

## 2017-02-20 NOTE — DISCHARGE INSTRUCTIONS
Discharge Instructions  Head Injury    You have been seen today for a head injury. You were checked for serious problems, like bleeding on the brain, but these problems cannot always be found right away.  Due to this risk, you should not be alone for 24 hours after your injury.  Follow up with your regular physician in 2 days. If you are taking a blood thinner, such as aspirin, Pradaxa  (dabigatran), Coumadin  (warfarin), or Plavix  (clopidogrel), you are at especially high risk for immediate or delayed bleeding, and need to re-check with a physician in 24 hours, or sooner if any of the symptoms below happen.     Return to the Emergency Department if:    You are confused, have amnesia, or you are not acting right.    Your headache gets worse or you start to have a really bad headache even with your recommended treatment plan.    You vomit more than once.    You have a convulsion or seizure.    You have trouble walking.    You have weakness or paralysis in an arm or a leg.    You have blood or fluid coming from your ears or nose.    You have new symptoms or anything that worries you.    Sleeping:  It is okay for you to sleep, but someone should wake you up as instructed by your doctor, and someone should check on you at your usual time to wake up.     Activity:    Do not drive for at least 24 hours.    Do not drive if you have dizzy spells or trouble concentrating, or remembering things.    Do not return to any contact sports until cleared by your regular doctor.     Follow-up:  It is very important that you make an appointment with your clinic and go to the appointment.  If you do not follow-up with your regular doctor, it may result in missing an important development which could result in permanent injury or disability and/or lasting pain.  If there is any problem keeping your appointment, call your doctor or return to the Emergency Department.    MORE INFORMATION:    Concussion:  A concussion is a minor head  injury that may cause temporary problems with the way your brain works.  Some symptoms include:  confusion, amnesia, nausea and vomiting, dizziness, fatigue, memory or concentration problems, irritability and sleep problems.    CT Scans: Your evaluation today may have included a CT scan (CAT scan) to look for things like bleeding or a skull fracture (break).  CT scans involve radiation and too many CT scans can cause serious health problems like cancer, especially in children.  Because of this, your doctor may not have ordered a CT scan today if they think you are at low risk for a serious or life threatening problem.    If you were given a prescription for medicine here today, be sure to read all of the information (including the package insert) that comes with your prescription.  This will include important information about the medicine, its side effects, and any warnings that you need to know about.  The pharmacist who fills the prescription can provide more information and answer questions you may have about the medicine.  If you have questions or concerns that the pharmacist cannot address, please call or return to the Emergency Department.     Opioid Medication Information    Pain medications are among the most commonly prescribed medicines, so we are including this information for all our patients. If you did not receive pain medication or get a prescription for pain medicine, you can ignore it.     You may have been given a prescription for an opioid (narcotic) pain medicine and/or have received a pain medicine while here in the Emergency Department. These medicines can make you drowsy or impaired. You must not drive, operate dangerous equipment, or engage in any other dangerous activities while taking these medications. If you drive while taking these medications, you could be arrested for DUI, or driving under the influence. Do not drink any alcohol while you are taking these medications.     Opioid pain  medications can cause addiction. If you have a history of chemical dependency of any type, you are at a higher risk of becoming addicted to pain medications.  Only take these prescribed medications to treat your pain when all other options have been tried. Take it for as short a time and as few doses as possible. Store your pain pills in a secure place, as they are frequently stolen and provide a dangerous opportunity for children or visitors in your house to start abusing these powerful medications. We will not replace any lost or stolen medicine.  As soon as your pain is better, you should flush all your remaining medication.     Many prescription pain medications contain Tylenol  (acetaminophen), including Vicodin , Tylenol #3 , Norco , Lortab , and Percocet .  You should not take any extra pills of Tylenol  if you are using these prescription medications or you can get very sick.  Do not ever take more than 3000 mg of acetaminophen in any 24 hour period.    All opioids tend to cause constipation. Drink plenty of water and eat foods that have a lot of fiber, such as fruits, vegetables, prune juice, apple juice and high fiber cereal.  Take a laxative if you don t move your bowels at least every other day. Miralax , Milk of Magnesia, Colace , or Senna  can be used to keep you regular.      Remember that you can always come back to the Emergency Department if you are not able to see your regular doctor in the amount of time listed above, if you get any new symptoms, or if there is anything that worries you.

## 2017-02-20 NOTE — ED NOTES
Pt presents to ED via EMS for evaluation of fall, no LOC, on coumadin.  Hx of 10 days of increased weakness. Recent hospitalization.    ABCs intact. AOx4

## 2017-04-12 NOTE — ED AVS SNAPSHOT
[facility] Emergency Department    [address]    [facility] [address]    Phone:  [phone]    Fax:  [phone]                                       Chuyita Gayle   MRN: [id number]    Department:  [facility] Emergency Department   Date of Visit:  4/12/2017           After Visit Summary Signature Page     I have received my discharge instructions, and my questions have been answered. I have discussed any challenges I see with this plan with the nurse or doctor.    ..........................................................................................................................................  Patient/Patient Representative Signature      ..........................................................................................................................................  Patient Representative Print Name and Relationship to Patient    ..................................................               ................................................  Date                                            Time    ..........................................................................................................................................  Reviewed by Signature/Title    ...................................................              ..............................................  Date                                                            Time

## 2017-04-12 NOTE — ED AVS SNAPSHOT
[facility]    [address]    [facility] [address]    Phone:  [phone]    Fax:  [phone]                                       Chuyita Gayle   MRN: [id number]    Department:  [facility]   Date of Visit:  4/12/2017           Patient Information     Date Of Birth          7/24/1932        Your diagnoses for this visit were:     Chronic bilateral low back pain with right-sided sciatica     Urinary tract infection without hematuria, site unspecified        You were seen by Prabhakar Dumont APRN CNP.      Follow-up Information     Follow up with Pallas, Kenneth G, MD In 3 days.    Specialty:  Family Practice    Why:  if continuned symptoms or sooner if worsening    Contact information:    [facility]  [address]  [facility] [address] [phone]          Follow up with [facility].    Specialty:  EMERGENCY MEDICINE    Why:  If symptoms worsen    Contact information:    [address]  [facility] [address] [phone]        Discharge Instructions         Possible Causes of Low Back or Leg Pain    The symptoms in your back or leg may be due to pressure on a nerve. This pressure may be caused by a damaged disk or by abnormal bone growth. Either way, you may feel pain, burning, tingling, or numbness. If you have pressure on a nerve that connects to the sciatic nerve, pain may shoot down your leg.    Pressure from the disk  Constant wear and tear can weaken a disk over time and cause back pain. The disk can then be damaged by a sudden movement or injury. If its soft center begins to bulge, the disk may press on a nerve. Or the outside of the disk may tear, and the soft center may squeeze through and pinch a nerve.    Pressure from bone  As a disk wears out, the vertebrae right above and below the disk begin to touch. This can put pressure on a nerve.  Often, abnormal bone (called bone spurs) grows where the vertebrae rub against each other. This can cause the foramen or the spinal canal to narrow (called stenosis) and press against a nerve.    8532-1767 [employer]. [address] [place] [address], [place] [address]. All rights reserved. This information is not intended as a substitute for professional medical care. Always follow your healthcare professional's instructions.    Discharge Instructions  Urinary Tract Infection  You have urinary tract infection, or UTI. The urinary tract includes the kidneys (which make urine), ureters (the tubes that carry urine from the kidneys to the bladder), the bladder (which stores urine), and urethra (the tube that carries urine out of the bladder).  Urinary tract infections occur when bacteria travel up the urethra into the bladder. We suspect a UTI based on chemical and microscopic findings in your urine, but if there is a question about your findings, we will do a culture to see if bacteria grow. A urine culture takes several days. You should always follow-up with your primary physician to find out about results of your culture if one was done.   Return to the Emergency Department if:    You have severe back pain.    You are vomiting so that you can t take your medicine, or have signs of dehydration (such as urinating less than 3 times per day).    You have fever over 101.5 degrees F.    You have significant confusion or are very weak, or feel very ill.    Your child seems much more ill, won t wake up, won t respond right, or is crying for a long time and won t calm down.    Your child is showing signs of dehydration, Signs of dehydration can be:  o Your infant has had no wet diapers in 4-5 hours.  o Your older child has not passed urine in 6-8 hours.  o Your infant or child starts to have dry mouth and lips, or no saliva or tears.    Follow-up with your doctor:     Children under 24 months need to be seen by their  regular doctor within one week after a diagnosis of a UTI. It may be necessary to do some imaging tests to look at the child s kidney or bladder.    You should begin to feel better within 24 - 48 hours of starting your antibiotic.  If you do not, you need to be seen again.      Treatment:     You will be treated with an antibiotic to kill the bacteria. We have to make an educated guess as to which antibiotic will work for your infection. In most healthy people, we can guess right almost all of the time. Sometimes a culture is done to show which antibiotics will work. This usually takes 2-3 days. When the culture is done, we may have to contact you to put you on a different antibiotic.    Take a pain medication such as Tylenol  (acetaminophen), Advil  (ibuprofen), Nuprin  (ibuprofen), or Aleve  (naproxen). If you have been given a narcotic such as Vicodin  (hydrocodone with acetaminophen), Percocet  (oxycodone with acetaminophen), or codeine, do not drive for four hours after you have taken it. If the narcotic contains Tylenol  (acetaminophen), do not take Tylenol  with it. All narcotics will cause constipation, so eat a high fiber diet.      Pyridium  (phenazopyridine) or Uristat  (phenazopyridine) is a prescription medication that numbs the bladder to reduce the burning pain of some UTIs.  The same medication is available in a non-prescription version called Azo-Standard  (phenazopyridine), Urodol  (phenazopyridine), or other brand names. This medication will change the color of the urine and tears (usually blue or orange). If you wear contacts, do not wear them while taking this medication as they may be stained by the medication.    Antibiotic Warning:     If you have been placed on antibiotics - watch for signs of allergic reaction.  These include rash, lip swelling, difficulty breathing, wheezing, and dizziness.  If you develop any of these symptoms, stop the antibiotic immediately and go to an emergency room  "or urgent care for evaluation.    Probiotics: If you have been given an antibiotic, you may want to also take a probiotic pill or eat yogurt with live cultures. Probiotics have \"good bacteria\" to help your intestines stay healthy. Studies have shown that probiotics help prevent diarrhea and other intestine problems (including C. diff infection) when you take antibiotics. You can buy these without a prescription in the pharmacy section of the store.   If you were given a prescription for medicine here today, be sure to read all of the information (including the package insert) that comes with your prescription.  This will include important information about the medicine, its side effects, and any warnings that you need to know about.  The pharmacist who fills the prescription can provide more information and answer questions you may have about the medicine.  If you have questions or concerns that the pharmacist cannot address, please call or return to the Emergency Department.   Opioid Medication Information    Pain medications are among the most commonly prescribed medicines, so we are including this information for all our patients. If you did not receive pain medication or get a prescription for pain medicine, you can ignore it.     You may have been given a prescription for an opioid (narcotic) pain medicine and/or have received a pain medicine while here in the Emergency Department. These medicines can make you drowsy or impaired. You must not drive, operate dangerous equipment, or engage in any other dangerous activities while taking these medications. If you drive while taking these medications, you could be arrested for DUI, or driving under the influence. Do not drink any alcohol while you are taking these medications.     Opioid pain medications can cause addiction. If you have a history of chemical dependency of any type, you are at a higher risk of becoming addicted to pain medications.  Only take these " prescribed medications to treat your pain when all other options have been tried. Take it for as short a time and as few doses as possible. Store your pain pills in a secure place, as they are frequently stolen and provide a dangerous opportunity for children or visitors in your house to start abusing these powerful medications. We will not replace any lost or stolen medicine.  As soon as your pain is better, you should flush all your remaining medication.     Many prescription pain medications contain Tylenol  (acetaminophen), including Vicodin , Tylenol #3 , Norco , Lortab , and Percocet .  You should not take any extra pills of Tylenol  if you are using these prescription medications or you can get very sick.  Do not ever take more than 3000 mg of acetaminophen in any 24 hour period.    All opioids tend to cause constipation. Drink plenty of water and eat foods that have a lot of fiber, such as fruits, vegetables, prune juice, apple juice and high fiber cereal.  Take a laxative if you don t move your bowels at least every other day. Miralax , Milk of Magnesia, Colace , or Senna  can be used to keep you regular.      Remember that you can always come back to the Emergency Department if you are not able to see your regular doctor in the amount of time listed above, if you get any new symptoms, or if there is anything that worries you.        24 Hour Appointment Hotline       To make an appointment at any [facility], call [phone] ([phone]). If you don't have a family doctor or clinic, we will help you find one. [place] clinics are conveniently located to serve the needs of you and your family.             Review of your medicines      START taking        Dose / Directions Last dose taken    cephALEXin 500 MG capsule   Commonly known as:  KEFLEX   Dose:  500 mg   Quantity:  20 capsule        Take 1 capsule (500 mg) by mouth 2 times daily for 10 days   Refills:  0        HYDROcodone-acetaminophen  5-325 MG per tablet   Commonly known as:  NORCO   Dose:  1 tablet   Quantity:  9 tablet        Take 1 tablet by mouth every 8 hours as needed for moderate to severe pain   Refills:  0          Our records show that you are taking the medicines listed below. If these are incorrect, please call your family doctor or clinic.        Dose / Directions Last dose taken    * acetaminophen 500 MG tablet   Commonly known as:  TYLENOL   Dose:  1000 mg        Take 1,000 mg by mouth 2 times daily   Refills:  0        * acetaminophen 500 MG tablet   Commonly known as:  TYLENOL   Dose:  1000 mg   Quantity:  100 tablet        Take 2 tablets (1,000 mg) by mouth 3 times daily   Refills:  0        * albuterol (2.5 MG/3ML) 0.083% neb solution   Dose:  1 vial        Take 1 vial by nebulization every 4 hours as needed for shortness of breath / dyspnea or wheezing   Refills:  0        * albuterol 108 (90 BASE) MCG/ACT Inhaler   Commonly known as:  PROAIR HFA/PROVENTIL HFA/VENTOLIN HFA   Dose:  1 puff        Inhale 1 puff into the lungs every 4 hours as needed for shortness of breath / dyspnea or wheezing   Refills:  0        * albuterol 108 (90 BASE) MCG/ACT Inhaler   Commonly known as:  PROAIR HFA/PROVENTIL HFA/VENTOLIN HFA   Dose:  2 puff        Inhale 2 puffs into the lungs every 4 hours as needed   Refills:  0        * albuterol (2.5 MG/3ML) 0.083% neb solution   Dose:  1 vial        Take 1 vial by nebulization every 4 hours as needed for shortness of breath / dyspnea or wheezing   Refills:  0        * atenolol 25 MG tablet   Commonly known as:  TENORMIN   Dose:  25 mg        Take 25 mg by mouth 2 times daily   Refills:  0        * atenolol 50 MG tablet   Commonly known as:  TENORMIN   Dose:  50 mg   Quantity:  30 tablet        Take 1 tablet (50 mg) by mouth 2 times daily   Refills:  1        * ATIVAN 0.5 MG tablet   Dose:  0.5 mg   Generic drug:  LORazepam        Take 0.5 mg by mouth 2 times daily as needed for anxiety   Refills:  0         * LORazepam 0.5 MG tablet   Commonly known as:  ATIVAN   Dose:  0.5 mg   Quantity:  30 tablet        Take 1 tablet (0.5 mg) by mouth 2 times daily as needed for anxiety   Refills:  0        * BREO ELLIPTA 100-25 MCG/INH oral inhaler   Dose:  1 puff   Generic drug:  fluticasone-vilanterol        Inhale 1 puff into the lungs daily   Refills:  0        * BREO ELLIPTA 100-25 MCG/INH oral inhaler   Dose:  1 puff   Generic drug:  fluticasone-vilanterol        Inhale 1 puff into the lungs daily   Refills:  0        cetirizine-psuedoePHEDrine 5-120 MG per 12 hr tablet   Commonly known as:  zyrTEC-D   Dose:  1 tablet   Quantity:  28 tablet        Take 1 tablet by mouth every 12 hours as needed for allergies   Refills:  0        * cholecalciferol 1000 UNIT tablet   Commonly known as:  vitamin D   Dose:  1000 Units        Take 1,000 Units by mouth daily   Refills:  0        * Vitamin D (Cholecalciferol) 1000 UNITS Caps   Dose:  1000 Units        Take 1,000 Units by mouth daily   Refills:  0        * Fish Oil 1200 MG Caps   Dose:  1 capsule        Take 1 capsule by mouth every morning   Refills:  0        * Fish Oil 1200 MG Caps   Dose:  1 capsule        Take 1 capsule by mouth daily   Refills:  0        * fluticasone 50 MCG/ACT spray   Commonly known as:  FLONASE   Dose:  1 spray        Spray 1 spray into both nostrils daily as needed for rhinitis or allergies   Refills:  0        * fluticasone 50 MCG/ACT spray   Commonly known as:  FLONASE   Dose:  1 spray        Spray 1 spray into both nostrils daily as needed for rhinitis or allergies   Refills:  0        * gabapentin 300 MG capsule   Commonly known as:  NEURONTIN   Dose:  600 mg        Take 600 mg by mouth At Bedtime   Refills:  0        * gabapentin 300 MG capsule   Commonly known as:  NEURONTIN   Dose:  600 mg        Take 600 mg by mouth At Bedtime   Refills:  0        * guaiFENesin-dextromethorphan 100-10 MG/5ML syrup   Commonly known as:  ROBITUSSIN DM   Dose:   5 mL        Take 5 mLs by mouth every 4 hours as needed for cough   Refills:  0        * guaiFENesin-dextromethorphan 100-10 MG/5ML syrup   Commonly known as:  ROBITUSSIN DM   Dose:  5-10 mL   Quantity:  354 mL        Take 5-10 mLs by mouth every 4 hours as needed for cough   Refills:  0        MULTIVITAMIN ADULT PO   Dose:  1 tablet        Take 1 tablet by mouth daily   Refills:  0        multivitamin, therapeutic Tabs tablet   Dose:  1 tablet        Take 1 tablet by mouth daily   Refills:  0        naproxen 500 MG tablet   Commonly known as:  NAPROSYN   Dose:  500 mg        Take 500 mg by mouth 2 times daily as needed for moderate pain   Refills:  0        * priLOSEC 20 MG CR capsule   Dose:  20 mg   Generic drug:  omeprazole        Take 20 mg by mouth daily   Refills:  0        * omeprazole 20 MG CR capsule   Commonly known as:  priLOSEC   Dose:  20 mg        Take 20 mg by mouth daily   Refills:  0        * senna-docusate 8.6-50 MG per tablet   Commonly known as:  SENOKOT-S;PERICOLACE   Dose:  1 tablet        Take 1 tablet by mouth 2 times daily as needed for constipation   Refills:  0        * senna-docusate 8.6-50 MG per tablet   Commonly known as:  SENOKOT-S;PERICOLACE   Dose:  1 tablet        Take 1 tablet by mouth 2 times daily as needed for constipation   Refills:  0        * VALTREX 500 MG tablet   Dose:  500 mg   Generic drug:  valACYclovir        Take 500 mg by mouth 2 times daily as needed For 3 days prn   Refills:  0        * VALTREX 500 MG tablet   Dose:  500 mg   Generic drug:  valACYclovir        Take 500 mg by mouth 2 times daily as needed (x3 days for cold sores)   Refills:  0        * WARFARIN SODIUM PO        1.25 mg MWF and 2.5 mg ROW   Refills:  0        * warfarin 2.5 MG tablet   Commonly known as:  COUMADIN        Take by mouth daily 2.5mg on Sun,Tues,Thurs,Sat, & 1.25mg on Mon,Wed,Fri   Refills:  0        * Notice:  This list has 30 medication(s) that are the same as other medications  prescribed for you. Read the directions carefully, and ask your doctor or other care provider to review them with you.            Prescriptions were sent or printed at these locations (2 Prescriptions)                   Other Prescriptions                Printed at Department/Unit printer (2 of 2)         cephALEXin (KEFLEX) 500 MG capsule               HYDROcodone-acetaminophen (NORCO) 5-325 MG per tablet                Procedures and tests performed during your visit     UA reflex to Microscopic    Urine Culture      Orders Needing Specimen Collection     None      Pending Results     Date and Time Order Name Status Description    4/13/2017 0034 Urine Culture In process             Pending Culture Results     Date and Time Order Name Status Description    4/13/2017 0034 Urine Culture In process             Test Results From Your Hospital Stay        4/13/2017 12:19 AM      Component Results     Component Value Ref Range & Units Status    Color Urine Yellow  Final    Appearance Urine Cloudy  Final    Glucose Urine Negative NEG mg/dL Final    Bilirubin Urine Negative NEG Final    Ketones Urine Negative NEG mg/dL Final    Specific Gravity Urine 1.012 1.003 - 1.035 Final    Blood Urine Negative NEG Final    pH Urine 6.0 5.0 - 7.0 pH Final    Protein Albumin Urine Negative NEG mg/dL Final    Urobilinogen mg/dL 0.0 0.0 - 2.0 mg/dL Final    Nitrite Urine Negative NEG Final    Leukocyte Esterase Urine Large (A) NEG Final    Source Midstream Urine  Final    RBC Urine 8 (H) 0 - 2 /HPF Final    WBC Urine 141 (H) 0 - 2 /HPF Final    Bacteria Urine Many (A) NEG /HPF Final    Squamous Epithelial /HPF Urine 1 0 - 1 /HPF Final    Transitional Epi <1 0 - 1 /HPF Final    Mucous Urine Present (A) NEG /LPF Final    Amorphous Crystals Few (A) NEG /HPF Final         4/13/2017 12:36 AM                Clinical Quality Measure: Blood Pressure Screening     Your blood pressure was checked while you were in the emergency department today.  "The last reading we obtained was  BP: 163/76 . Please read the guidelines below about what these numbers mean and what you should do about them.  If your systolic blood pressure (the top number) is less than 120 and your diastolic blood pressure (the bottom number) is less than 80, then your blood pressure is normal. There is nothing more that you need to do about it.  If your systolic blood pressure (the top number) is 120-139 or your diastolic blood pressure (the bottom number) is 80-89, your blood pressure may be higher than it should be. You should have your blood pressure rechecked within a year by a primary care provider.  If your systolic blood pressure (the top number) is 140 or greater or your diastolic blood pressure (the bottom number) is 90 or greater, you may have high blood pressure. High blood pressure is treatable, but if left untreated over time it can put you at risk for heart attack, stroke, or kidney failure. You should have your blood pressure rechecked by a primary care provider within the next 4 weeks.  If your provider in the emergency department today gave you specific instructions to follow-up with your doctor or provider even sooner than that, you should follow that instruction and not wait for up to 4 weeks for your follow-up visit.        Thank you for choosing [facility]       Thank you for choosing [facility] for your care. Our goal is always to provide you with excellent care. Hearing back from our patients is one way we can continue to improve our services. Please take a few minutes to complete the written survey that you may receive in the mail after you visit with us. Thank you!        [employer] Information     [employer] lets you send messages to your doctor, view your test results, renew your prescriptions, schedule appointments and more. To sign up, go to [email or web address] . Click on \"Log in\" on the left side of the screen, which will take you to the Welcome page. Then click on " "\"Sign up Now\" on the right side of the page.     You will be asked to enter the access code listed below, as well as some personal information. Please follow the directions to create your username and password.     Your access code is: [email or web address]  Expires: 2017 11:12 AM     Your access code will  in 90 days. If you need help or a new code, please call your [place] clinic or [phone].        Care EveryWhere ID     This is your Care EveryWhere ID. This could be used by other organizations to access your [facility] medical records  [id number]        After Visit Summary       This is your record. Keep this with you and show to your community pharmacist(s) and doctor(s) at your next visit.                  "

## 2017-04-13 NOTE — ED NOTES
Pt place of residence [facility] called and notified of return. Asked about family being updated in which they said they were called. Gave a short report to staff member Michelle and informed of transportation home via wheelchair ambulance. Michelle stated her and other staff would be there waiting for patient.

## 2017-04-13 NOTE — ED NOTES
Pt up to bedside commode with heavy 2 assist. Pt able to void. Pt transferred back to bed and connected to BP and pulse ox. Given call light.

## 2017-04-13 NOTE — ED PROVIDER NOTES
"  History     Chief Complaint:  Back Pain    HPI    Chuyita Gayle is a 84 year old female with a PMH significant for atrial fibrillation, on Coumadin, who presents to the emergency department from nursing home for evaluation of back pain. The patient reports having lower back pain that radiates into her right hip down her right leg for the past two days. She rates her pain as a 9/10 in severity, worse when bearing weight. She took some left over Tramadol with temporary relief, last dose taken this afternoon. She denies recent fall or injury. The patient mentions having similar pain years ago that was resolved with a cortisone shot, and she is wondering if she can receive this in the ED. The patient endorses recent urinary frequency, but denies any new numbness or weakness.     Allergies:  Nabumetone  Sulfa  Vioxx  Celecoxib  Sulfa  Celebrex     Medications:    Tenormin  Tylenol  Neurontin  Prilosec  Coumadin  Albuterol nebulizer  Valtrex  Ativan  Albuterol inhaler    Past Medical History:    Atrial fibrillation  Anxiety  Fibromyalgia  Asthma    Past Surgical History:    Back surgery  Cholecystectomy  GI surgery    Family History:   History reviewed. No pertinent family history.    Social History:   Tobacco use:    Negative  Alcohol use:    Negative  Marital status:         Review of Systems   Genitourinary: Positive for frequency.   Musculoskeletal: Positive for back pain.   Neurological: Negative for weakness and numbness.   All other systems reviewed and are negative.    Physical Exam   First Vitals:  BP: 170/74  Pulse: 68  Temp: 97.6  F (36.4  C)  Resp: 18  Height: 157.5 cm (5' 2\")  Weight: 61.2 kg (135 lb)  SpO2: 95 %      Physical Exam  General: Alert, Moderate discomfort, Elderly and frail  Eyes: PERRL, conjunctivae pink no scleral icterus or conjunctival injection  ENT:   Moist mucus membranes, posterior oropharynx clear without erythema or exudates, No lymphadenopathy, Normal voice  Resp:  Lungs " clear to auscultation bilaterally, no crackles/rubs/wheezes. Good air movement  CV:  Normal rate and rhythm, no murmurs/rubs/gallops  GI:  Abdomen soft and non-distended.  Normoactive BS.  No tenderness, guarding or rebound, No masses  Skin:  Warm, dry.  No rashes or petechiae  Musculoskeletal: Tenderness along with lumbosacral region bilaterally. Non-tender hip, normal ROM of bilateral lower extremities. No peripheral edema or calf tenderness.  Neuro: Alert and oriented to person/place/time, normal sensation  Psychiatric: Flat/depressed affect, cooperative, good eye contact    Emergency Department Course     Laboratory:  UA: Leuk est large, RBC 8 high,  high, bacteria many, mucous present, amorphous crystals, o/w negative  Urine culture: Pending    Emergency Department Course:  Nursing notes and vitals reviewed.   I performed an exam of the patient as documented above.   The above workup was undertaken.   2332: Robaxin 750 mg Tablet PO  2332: Tramadol 50 mg Tablet PO  2334: Lidocaine 5% patch transdermal  0100: Keflex 500 mg Capsule PO  0100: Norco 5-325 mg Tablet PO  I personally reviewed the laboratory results with the Patient and answered all related questions prior to discharge.    Findings and plan explained to the Patient. Patient discharged home with instructions regarding supportive care, medications, and reasons to return. The importance of close follow-up was reviewed. The patient was prescribed Keflex and Norco.    Impression & Plan      Medical Decision Making:  Chuyita Gyale is a 84 year old female who presented from nursing home for evaluation of increasing back pain. She has a longstanding history of back pain. She states that it was radiating down her right leg at this time. She has had difficulty sleeping the last few nights so was sent here for evaluation. Patient thought she could get a steroid shot in her hip if she came to the emergency department. This has helped her before. She did  "have some relief with the above interventions. She did note that she had urinary frequency so I checked her urine which shows a UTI. This is likely contributing to her level of discomfort. She is not febrile and does not have signs or symptoms of sepsis. We discussed the patient with her caregiver at the nursing home and she is at approximately her baseline functioning level. She can ambulate \"very slowly\" but it does take at least 1-2 people to get her out of bed and get her moving.  He also noted that she \"always complains of her back\" as you move her. At this point in time this appears to be her chronic pain in exacerbation with her UTI. No trauma and no imagery indicated.  She will sent back to her nursing care facility where they are quite comfortable with and capable of taking care of her. She was given her first dose of Keflex here and given a prescription for Keflex and a small prescription for Norco. She will be evaluated by the caregiver at the facility for further medications.         Diagnosis:    ICD-10-CM    1. Chronic bilateral low back pain with right-sided sciatica M54.41     G89.29    2. Urinary tract infection without hematuria, site unspecified N39.0      Disposition:  Discharged to home.    Discharge Medications:  New Prescriptions    CEPHALEXIN (KEFLEX) 500 MG CAPSULE    Take 1 capsule (500 mg) by mouth 2 times daily for 10 days    HYDROCODONE-ACETAMINOPHEN (NORCO) 5-325 MG PER TABLET    Take 1 tablet by mouth every 8 hours as needed for moderate to severe pain       Otto WERNER, am serving as a scribe at 10:59 PM on 4/12/2017 to document services personally performed by Prabhakar Dumont NP, based on my observations and the provider's statements to me.   Otto Hansen  4/12/2017   [facility] EMERGENCY DEPARTMENT       Prabhakar Dumont APRN CNP  04/13/17 0111       Prabhakar Dumont APRN CNP  04/13/17 0146    "

## 2017-04-13 NOTE — ED NOTES
Bed: ED23  Expected date: 4/12/17  Expected time: 9:30 PM  Means of arrival: Ambulance  Comments:  Tobias Sexton 84F

## 2017-04-13 NOTE — ED NOTES
"Pt has had lower back pain that radiates down the R leg for the past 2 days. In the past, many years ago patient has received a cortisone shot for relief of the pain. Pt rates 9/10 pain. Pt normally ambulates with a walker. Pt states she has been walking the last couple days with \"sheer determination\" due to the pain. Alert. ABC intact.  "

## 2017-04-13 NOTE — ED NOTES
Contacted [facility] where patient resides. Talked with 2 staff members, Michelle and Ollie. Per staff pt baseline is a strong 1-2 person assist to transfer and is pushed on seat of walker to get to bathroom. Pt complains of back pain with movement as well. Mobility here appears to be similar to that of home.

## 2017-04-13 NOTE — DISCHARGE INSTRUCTIONS
Possible Causes of Low Back or Leg Pain    The symptoms in your back or leg may be due to pressure on a nerve. This pressure may be caused by a damaged disk or by abnormal bone growth. Either way, you may feel pain, burning, tingling, or numbness. If you have pressure on a nerve that connects to the sciatic nerve, pain may shoot down your leg.    Pressure from the disk  Constant wear and tear can weaken a disk over time and cause back pain. The disk can then be damaged by a sudden movement or injury. If its soft center begins to bulge, the disk may press on a nerve. Or the outside of the disk may tear, and the soft center may squeeze through and pinch a nerve.    Pressure from bone  As a disk wears out, the vertebrae right above and below the disk begin to touch. This can put pressure on a nerve. Often, abnormal bone (called bone spurs) grows where the vertebrae rub against each other. This can cause the foramen or the spinal canal to narrow (called stenosis) and press against a nerve.    6774-8368 [employer]. [address] [place] [address]. All rights reserved. This information is not intended as a substitute for professional medical care. Always follow your healthcare professional's instructions.    Discharge Instructions  Urinary Tract Infection  You have urinary tract infection, or UTI. The urinary tract includes the kidneys (which make urine), ureters (the tubes that carry urine from the kidneys to the bladder), the bladder (which stores urine), and urethra (the tube that carries urine out of the bladder).  Urinary tract infections occur when bacteria travel up the urethra into the bladder. We suspect a UTI based on chemical and microscopic findings in your urine, but if there is a question about your findings, we will do a culture to see if bacteria grow. A urine culture takes several days. You should always follow-up with your primary physician to find out about results of your  culture if one was done.   Return to the Emergency Department if:    You have severe back pain.    You are vomiting so that you can t take your medicine, or have signs of dehydration (such as urinating less than 3 times per day).    You have fever over 101.5 degrees F.    You have significant confusion or are very weak, or feel very ill.    Your child seems much more ill, won t wake up, won t respond right, or is crying for a long time and won t calm down.    Your child is showing signs of dehydration, Signs of dehydration can be:  o Your infant has had no wet diapers in 4-5 hours.  o Your older child has not passed urine in 6-8 hours.  o Your infant or child starts to have dry mouth and lips, or no saliva or tears.    Follow-up with your doctor:     Children under 24 months need to be seen by their regular doctor within one week after a diagnosis of a UTI. It may be necessary to do some imaging tests to look at the child s kidney or bladder.    You should begin to feel better within 24 - 48 hours of starting your antibiotic.  If you do not, you need to be seen again.      Treatment:     You will be treated with an antibiotic to kill the bacteria. We have to make an educated guess as to which antibiotic will work for your infection. In most healthy people, we can guess right almost all of the time. Sometimes a culture is done to show which antibiotics will work. This usually takes 2-3 days. When the culture is done, we may have to contact you to put you on a different antibiotic.    Take a pain medication such as Tylenol  (acetaminophen), Advil  (ibuprofen), Nuprin  (ibuprofen), or Aleve  (naproxen). If you have been given a narcotic such as Vicodin  (hydrocodone with acetaminophen), Percocet  (oxycodone with acetaminophen), or codeine, do not drive for four hours after you have taken it. If the narcotic contains Tylenol  (acetaminophen), do not take Tylenol  with it. All narcotics will cause constipation, so eat a  "high fiber diet.      Pyridium  (phenazopyridine) or Uristat  (phenazopyridine) is a prescription medication that numbs the bladder to reduce the burning pain of some UTIs.  The same medication is available in a non-prescription version called Azo-Standard  (phenazopyridine), Urodol  (phenazopyridine), or other brand names. This medication will change the color of the urine and tears (usually blue or orange). If you wear contacts, do not wear them while taking this medication as they may be stained by the medication.    Antibiotic Warning:     If you have been placed on antibiotics - watch for signs of allergic reaction.  These include rash, lip swelling, difficulty breathing, wheezing, and dizziness.  If you develop any of these symptoms, stop the antibiotic immediately and go to an emergency room or urgent care for evaluation.    Probiotics: If you have been given an antibiotic, you may want to also take a probiotic pill or eat yogurt with live cultures. Probiotics have \"good bacteria\" to help your intestines stay healthy. Studies have shown that probiotics help prevent diarrhea and other intestine problems (including C. diff infection) when you take antibiotics. You can buy these without a prescription in the pharmacy section of the store.   If you were given a prescription for medicine here today, be sure to read all of the information (including the package insert) that comes with your prescription.  This will include important information about the medicine, its side effects, and any warnings that you need to know about.  The pharmacist who fills the prescription can provide more information and answer questions you may have about the medicine.  If you have questions or concerns that the pharmacist cannot address, please call or return to the Emergency Department.   Opioid Medication Information    Pain medications are among the most commonly prescribed medicines, so we are including this information for all " our patients. If you did not receive pain medication or get a prescription for pain medicine, you can ignore it.     You may have been given a prescription for an opioid (narcotic) pain medicine and/or have received a pain medicine while here in the Emergency Department. These medicines can make you drowsy or impaired. You must not drive, operate dangerous equipment, or engage in any other dangerous activities while taking these medications. If you drive while taking these medications, you could be arrested for DUI, or driving under the influence. Do not drink any alcohol while you are taking these medications.     Opioid pain medications can cause addiction. If you have a history of chemical dependency of any type, you are at a higher risk of becoming addicted to pain medications.  Only take these prescribed medications to treat your pain when all other options have been tried. Take it for as short a time and as few doses as possible. Store your pain pills in a secure place, as they are frequently stolen and provide a dangerous opportunity for children or visitors in your house to start abusing these powerful medications. We will not replace any lost or stolen medicine.  As soon as your pain is better, you should flush all your remaining medication.     Many prescription pain medications contain Tylenol  (acetaminophen), including Vicodin , Tylenol #3 , Norco , Lortab , and Percocet .  You should not take any extra pills of Tylenol  if you are using these prescription medications or you can get very sick.  Do not ever take more than 3000 mg of acetaminophen in any 24 hour period.    All opioids tend to cause constipation. Drink plenty of water and eat foods that have a lot of fiber, such as fruits, vegetables, prune juice, apple juice and high fiber cereal.  Take a laxative if you don t move your bowels at least every other day. Miralax , Milk of Magnesia, Colace , or Senna  can be used to keep you regular.       Remember that you can always come back to the Emergency Department if you are not able to see your regular doctor in the amount of time listed above, if you get any new symptoms, or if there is anything that worries you.

## 2017-12-13 PROBLEM — N39.0 UTI (URINARY TRACT INFECTION): Status: ACTIVE | Noted: 2017-01-01

## 2017-12-13 NOTE — IP AVS SNAPSHOT
"[place] [address] MEDICAL SURGICAL: [phone]                                              INTERAGENCY TRANSFER FORM - LAB / IMAGING / EKG / EMG RESULTS   2017                    Hospital Admission Date: 2017  EDISON WATTS   : 1932  Sex: Female        Attending Provider: Roberto Carlos Waterman MD     Allergies:  Nabumetone, Sulfa Drugs, Vioxx [Rofecoxib], Celecoxib, Nabumetone, Sulfa Drugs, Vioxx [Rofecoxib], Celebrex [Celecoxib]    Infection:  None   Service:  GENERAL MEDI    Ht:  1.626 m (5' 4\")   Wt:  65.2 kg (143 lb 11.2 oz)   Admission Wt:  65.2 kg (143 lb 11.2 oz)    BMI:  24.67 kg/m 2   BSA:  1.72 m 2            Patient PCP Information     Provider PCP Type    Sen Flores MD General      Unresulted Labs     None         ECG/EMG Results      Echocardiogram Complete [[id number]]  Resulted: 17, Result status: Edited Result - FINAL    Ordering provider: Edgar Braga MD  17 0728 Resulted by: Willie Seth MD    Performed: 17 - 17 Resulting lab: RADIOLOGY RESULTS    Narrative:       [id number]  [facility]  [id number]  [id number]^JANELL^EDGAR^BONNIE           [facility]  Echocardiography Laboratory  [address] [place] [address]        Name: EDISON WATTS  MRN: [id number]  : 1932  Study Date: 2017 09:15 AM  Age: 85 yrs  Gender: Female  Patient [facility]  Reason For Study: Afib  Ordering Physician: EDGAR BRAGA  Referring Physician: Sen Flores  Performed By: Carmen Izquierdo RDCS     BSA: 1.7 m2  Height: 64 in  Weight: 143 lb  HR: 104  BP: 133/88 mmHg  _____________________________________________________________________________  __        Procedure  Complete Portable Echo Adult.  _____________________________________________________________________________  __        Interpretation Summary     Hyperdynamic left ventricular function  The visual ejection fraction is " estimated at >70%.  There is moderate (2+) mitral regurgitation.  The rhythm was rapid atrial fibrillation.  The study was technically difficult. There is no comparison study available.  _____________________________________________________________________________  __        Left Ventricle  The left ventricle is normal in size. Hyperdynamic left ventricular function.  The visual ejection fraction is estimated at >70%. Left ventricular diastolic  function is indeterminate. No regional wall motion abnormalities noted.     Right Ventricle  The right ventricle is normal size. The right ventricular systolic function is  normal.     Atria  Normal left atrial size. Right atrial size is normal. There is no color  Doppler evidence of an atrial shunt.     Mitral Valve  There is mild mitral annular calcification. The mitral valve leaflets are  mildly thickened. There is moderate (2+) mitral regurgitation.        Tricuspid Valve  There is mild to moderate (1-2+) tricuspid regurgitation. The right  ventricular systolic pressure is approximated at 29.0 mmHg plus the right  atrial pressure. Normal IVC (1.5-2.5cm) with >50% respiratory collapse; right  atrial pressure is estimated at 5-10mmHg.     Aortic Valve  There is mild trileaflet aortic sclerosis. There is trace aortic  regurgitation. No aortic stenosis is present.     Pulmonic Valve  The pulmonic valve is not well seen, but is grossly normal.     Vessels  The aortic root is normal size.     Pericardium  There is no pericardial effusion.        Rhythm  The rhythm was rapid atrial fibrillation.  _____________________________________________________________________________  __  MMode/2D Measurements & Calculations  IVC diam: 2.0 cm     Ao root diam: 2.9 cm  LA dimension: 3.6 cm  asc Aorta Diam: 3.2 cm  LA/Ao: 1.2  LA Volume (BP): 47.0 ml  LA Volume Index (BP): 27.6 ml/m2        Doppler Measurements & Calculations  MV E max rosemarie: 113.0 cm/sec  MV dec time: 0.12 sec  MR ERO: 0.20  cm2  MR volume: 28.9 ml     TR max rosemarie: 268.7 cm/sec  TR max P.0 mmHg  E/E' av.0  Lateral E/e': 11.5  Medial E/e': 12.6           _____________________________________________________________________________  __           Report approved by: Micaela Gong 2017 02:40 PM       1    Type Source Collected On     17 0915          View Image (below)              Encounter-Level Documents:     There are no encounter-level documents.      Order-Level Documents:     There are no order-level documents.

## 2017-12-13 NOTE — IP AVS SNAPSHOT
"[place] [address] MEDICAL SURGICAL: [phone]                                              INTERAGENCY TRANSFER FORM - PHYSICIAN ORDERS   2017                    Hospital Admission Date: 2017  EDISON WATTS   : 1932  Sex: Female        Attending Provider: Roberto Carlos Waterman MD     Allergies:  Nabumetone, Sulfa Drugs, Vioxx [Rofecoxib], Celecoxib, Nabumetone, Sulfa Drugs, Vioxx [Rofecoxib], Celebrex [Celecoxib]    Infection:  None   Service:  GENERAL MEDI    Ht:  1.626 m (5' 4\")   Wt:  65.2 kg (143 lb 11.2 oz)   Admission Wt:  65.2 kg (143 lb 11.2 oz)    BMI:  24.67 kg/m 2   BSA:  1.72 m 2            Patient PCP Information     Provider PCP James Flores MD General      ED Clinical Impression     Diagnosis Description Comment Added By Time Added    Dysuria [R30.0] Dysuria [R30.0]  Trinity Santana MD 2017  9:14 PM      Hospital Problems as of 2017              Priority Class Noted POA    UTI (urinary tract infection) Medium  2017 Yes      Non-Hospital Problems as of 2017              Priority Class Noted    Atrial fibrillation with RVR (H) Medium  2017      Code Status History     Date Active Date Inactive Code Status Order ID Comments User Context    2017  8:25 AM  DNR/DNI [id number] [facility] Dinah Gilman MD Outpatient    2017  6:23 PM 2017  8:25 AM DNR/DNI [id number] Code status discussion is appropriately documented in the chart. Joshua Vernon MD Inpatient    2017 11:45 AM 2017  6:23 PM DNR/DNI [id number] Code status discussion is appropriately documented in the chart. Vandana Hagen APRN CNS Inpatient    2017  1:50 PM 2017 11:45 AM DNR/DNI [id number]  Edgar Braga MD Outpatient    2017  1:31 PM 2017  1:50 PM DNR/DNI [id number]  Ramo Townsend MD Inpatient    2017 10:44 PM 2017  1:31 PM Full Code [id number]  Roberto Carlos Waterman MD Inpatient    " 2/15/2017 10:10 AM 12/13/2017 10:44 PM Full Code [id number]  Sheridan Rhodes PA-C Outpatient    2/14/2017 11:43 PM 2/15/2017 10:10 AM Full Code [id number]  Ramo Townsend MD Inpatient    2/13/2017 10:59 AM  Full Code [id number]  Marisa Clarke PA-C Outpatient    2/10/2017  5:09 PM 2/13/2017 10:59 AM Full Code [id number]  Taylor Torres PA-C ED         Medication Review      START taking        Dose / Directions Comments    * acetaminophen 325 MG tablet   Commonly known as:  TYLENOL   Used for:  Chronic pain syndrome        Dose:  650 mg   Take 2 tablets (650 mg) by mouth every 4 hours as needed for mild pain   Quantity:  100 tablet   Refills:  0        * acetaminophen 650 MG Suppository   Commonly known as:  TYLENOL   Used for:  Chronic pain syndrome        Dose:  650 mg   Place 1 suppository (650 mg) rectally every 4 hours as needed for fever or mild pain (Do not exceed 4000 mg total acetaminophen per day.) Unwrap prior to insertion.   Quantity:  12 suppository   Refills:  1        atropine 1 % ophthalmic solution   Used for:  Chronic pain syndrome        Dose:  2 drop   Take 2 drops by mouth, place under tongue or place inside cheek every 2 hours as needed for other (terminal respiratory secretions) Not for ophthalmic use.   Quantity:  5 mL   Refills:  1        bisacodyl 10 MG Suppository   Commonly known as:  DULCOLAX   Used for:  Chronic pain syndrome        Unwrap and insert 1 suppository rectally twice daily as needed for constipation.   Quantity:  12 suppository   Refills:  1        diclofenac 1 % Gel topical gel   Commonly known as:  VOLTAREN   Used for:  Chronic pain syndrome        Dose:  2 g   Place 2 g onto the skin 4 times daily Over painful joints   Refills:  0        LORazepam 0.5 MG tablet   Commonly known as:  ATIVAN   Used for:  Chronic pain syndrome        Dose:  0.5 mg   Take 1 tablet (0.5 mg) by mouth, place under tongue or insert rectally every 4 hours as needed for anxiety  (restlessness)   Quantity:  30 tablet   Refills:  1        MEDICATION INSTRUCTION   Used for:  Chronic pain syndrome        If care facility cannot accept or use ranges, facility is instructed to use lower end of dosing range   Refills:  0        melatonin 3 MG tablet   Used for:  Insomnia, unspecified type        Dose:  3 mg   Take 1 tablet (3 mg) by mouth nightly as needed for sleep   Refills:  0        oxyCODONE IR 5 MG tablet   Commonly known as:  ROXICODONE   Used for:  Chronic pain syndrome        Dose:  5 mg   Take 1 tablet (5 mg) by mouth every 4 hours May take additional 5 mg every 1 hour prn for pain, dyspnea, or respiratory rate greater than 20   Quantity:  30 tablet   Refills:  0        polyethylene glycol Packet   Commonly known as:  MIRALAX/GLYCOLAX   Used for:  Drug-induced constipation        Dose:  17 g   Take 17 g by mouth daily as needed for constipation   Quantity:  7 packet   Refills:  0        predniSONE 5 MG tablet   Commonly known as:  DELTASONE   Used for:  Myalgia        For suspected polymyalgia rheumatica.  Take 10 mg daily for 4 weeks then taper to 5 mg daily for 4 weeks then stop unless directed to continue by Rheumatology.   Quantity:  84 tablet   Refills:  0        * Notice:  This list has 2 medication(s) that are the same as other medications prescribed for you. Read the directions carefully, and ask your doctor or other care provider to review them with you.      CONTINUE these medications which have NOT CHANGED        Dose / Directions Comments    albuterol 108 (90 BASE) MCG/ACT Inhaler   Commonly known as:  PROAIR HFA/PROVENTIL HFA/VENTOLIN HFA        Dose:  2 puff   Inhale 2 puffs into the lungs 2 times daily as needed for shortness of breath / dyspnea or wheezing   Refills:  0        atenolol 25 MG tablet   Commonly known as:  TENORMIN        Dose:  50 mg   Take 50 mg by mouth 2 times daily   Refills:  0        BREO ELLIPTA 100-25 MCG/INH oral inhaler   Generic drug:   fluticasone-vilanterol        Dose:  1 puff   Inhale 1 puff into the lungs daily   Refills:  0        * CALMOSEPTINE EX        Externally apply topically 2 times daily   Refills:  0        * menthol-zinc oxide 0.44-20.625 % Oint ointment   Commonly known as:  CALMOSEPTINE        Apply topically every hour as needed for skin protection   Refills:  0        cetirizine 10 MG tablet   Commonly known as:  zyrTEC        Dose:  10 mg   Take 10 mg by mouth daily   Refills:  0        fluticasone 50 MCG/ACT spray   Commonly known as:  FLONASE        Dose:  2 spray   Spray 2 sprays into both nostrils daily   Refills:  0        GABAPENTIN PO        Dose:  600 mg   Take 600 mg by mouth At Bedtime   Refills:  0        latanoprost 0.005 % ophthalmic solution   Commonly known as:  XALATAN        Dose:  1 drop   Place 1 drop into both eyes At Bedtime   Refills:  0        Multi-vitamin Tabs tablet        Dose:  1 tablet   Take 1 tablet by mouth daily   Refills:  0        OMEPRAZOLE PO        Dose:  20 mg   Take 20 mg by mouth every morning   Refills:  0        OSTEO BI-FLEX ADV DOUBLE ST PO        Dose:  1 tablet   Take 1 tablet by mouth 2 times daily   Refills:  0        PREPARATION H 1-0.25-14.4-15 % Crea cream   Generic drug:  pramox-pe-glycerin-petrolatum        Place rectally as needed for hemorrhoids   Refills:  0        * senna-docusate 8.6-50 MG per tablet   Commonly known as:  SENOKOT-S;PERICOLACE        Dose:  1 tablet   Take 1 tablet by mouth daily   Refills:  0        * senna-docusate 8.6-50 MG per tablet   Commonly known as:  SENOKOT-S;PERICOLACE        Dose:  1 tablet   Take 1 tablet by mouth daily as needed for constipation   Refills:  0        trolamine salicylate 10 % cream   Commonly known as:  ASPERCREME        Apply topically 2 times daily   Refills:  0        VENTOLIN IN        Refills:  0        VITAMIN D (CHOLECALCIFEROL) PO        Dose:  1000 Units   Take 1,000 Units by mouth daily   Refills:  0        *  Notice:  This list has 4 medication(s) that are the same as other medications prescribed for you. Read the directions carefully, and ask your doctor or other care provider to review them with you.      STOP taking     FISH OIL + D3 PO           fish Oil 1200 MG capsule           HYDROcodone-acetaminophen 5-325 MG per tablet   Commonly known as:  NORCO           JANTOVEN PO           TRAMADOL HCL PO                   Summary of Visit     Reason for your hospital stay       Severe diffuse pain             After Care     Activity       Your activity upon discharge: activity as tolerated       Diet       Follow this diet upon discharge: Orders Placed This Encounter      Snacks/Supplements Adult: Ensure Plus (Adult); Between Meals      Combination Diet Regular Diet Adult             Follow-Up Appointment Instructions     Future Labs/Procedures    Follow-up and recommended labs and tests      Comments:    Follow up with hospice      Follow-Up Appointment Instructions     Follow-up and recommended labs and tests        Follow up with hospice             Statement of Approval     Ordered          12/26/17 0825  I have reviewed and agree with all the recommendations and orders detailed in this document.  EFFECTIVE NOW     Approved and electronically signed by:  Dinah Gilman MD

## 2017-12-13 NOTE — IP AVS SNAPSHOT
` `     [place] [address] MEDICAL SURGICAL: [phone]            Medication Administration Report for Chuyita Gayle as of 12/26/17 1302   Legend:    Given Hold Not Given Due Canceled Entry Other Actions    Time Time (Time) Time  Time-Action       Inactive    Active    Linked        Medications 12/20/17 12/21/17 12/22/17 12/23/17 12/24/17 12/25/17 12/26/17    acetaminophen (TYLENOL) tablet 650 mg  Dose: 650 mg Freq: EVERY 4 HOURS PRN Route: PO  PRN Reason: mild pain  Start: 12/13/17 2242   Admin Instructions: Alternate ibuprofen (if ordered) with acetaminophen.  Maximum acetaminophen dose from all sources = 75 mg/kg/day not to exceed 4 grams/day.     0948 (650 mg)-Given       2342 (650 mg)-Given        0312 (650 mg)-Given        0134 (650 mg)-Given        1338 (650 mg)-Given              albuterol (PROAIR HFA/PROVENTIL HFA/VENTOLIN HFA) Inhaler 2 puff  Dose: 2 puff Freq: 2 TIMES DAILY PRN Route: IN  PRN Reasons: shortness of breath / dyspnea,wheezing  Start: 12/13/17 2326              amitriptyline (ELAVIL) tablet 50 mg  Dose: 50 mg Freq: AT BEDTIME Route: PO  Start: 12/22/17 2200      2049 (50 mg)-Given               2213 (50 mg)-Given        2110 (50 mg)-Given        2133 (50 mg)-Given        [ ] 2200           atenolol (TENORMIN) tablet 50 mg  Dose: 50 mg Freq: 2 TIMES DAILY Route: PO  Start: 12/22/17 2100      2046 (50 mg)-Given        (1031)-Not Given       2213 (50 mg)-Given        (1102)-Not Given       2110 (50 mg)-Given        (0844)-Not Given       2026 (50 mg)-Given        (0904)-Not Given       [ ] 2100           bisacodyl (DULCOLAX) Suppository 10 mg  Dose: 10 mg Freq: DAILY PRN Route: RE  PRN Reason: constipation  Start: 12/16/17 1619              diclofenac (VOLTAREN) 1 % topical gel 2 g  Dose: 2 g Freq: 4 TIMES DAILY Route: TD  Start: 12/19/17 1800   Admin Instructions: Apply to left hand  Send dosing card with product.     0926 (2 g)-Given       1414 (2 g)-Given       1737 (2 g)-Given        2101 (2 g)-Given        0803 (2 g)-Given       1308 (2 g)-Given       1721 (2 g)-Given       2101 (2 g)-Given        0854 (2 g)-Given       1316 (2 g)-Given       1653 (2 g)-Given              2050 (2 g)-Given               1024 (2 g)-Given       1308 (2 g)-Given       1850 (2 g)-Given       2213 (2 g)-Given        1103 (2 g)-Given       1437 (2 g)-Given       1755 (2 g)-Given       2111 (2 g)-Given        0905 (2 g)-Given       (1223)-Not Given       1650 (2 g)-Given              2133 (2 g)-Given        (0904)-Not Given       1248 (2 g)-Given       [ ] 1800       [ ] 2200           fluticasone-vilanterol (BREO ELLIPTA) 100-25 MCG/INH oral inhaler 1 puff  Dose: 1 puff Freq: DAILY Route: IN  Start: 12/14/17 0830   Admin Instructions: *Do not use more frequently than once daily.*  Rinse mouth after use.     0839 (1 puff)-Given        0817 (1 puff)-Given        0832 (1 puff)-Given        0745 (1 puff)-Given        0804 (1 puff)-Given        0833 (1 puff)-Given        0758 (1 puff)-Given           gabapentin (NEURONTIN) tablet 600 mg  Dose: 600 mg Freq: AT BEDTIME Route: PO  Start: 12/13/17 2345    2100 (600 mg)-Given        2100 (600 mg)-Given        2050 (600 mg)-Given               2213 (600 mg)-Given        2110 (600 mg)-Given        2132 (600 mg)-Given        [ ] 2200           HYDROmorphone (PF) (DILAUDID) injection 0.3-0.5 mg  Dose: 0.3-0.5 mg Freq: EVERY 2 HOURS PRN Route: IV  PRN Reason: severe pain  Start: 12/14/17 1321   Admin Instructions: Hold while on PCA.  For ordered doses up to 4 mg give IV Push undiluted. Administer each 2mg over 2-5 minutes.      0233 (0.5 mg)-Given        0338 (0.5 mg)-Given               latanoprost (XALATAN) 0.005 % ophthalmic solution 1 drop  Dose: 1 drop Freq: AT BEDTIME Route: Both Eyes  Start: 12/13/17 2330   Admin Instructions: Refrigerated product.     2111 (1 drop)-Given        2100 (1 drop)-Given        2050 (1 drop)-Given               2212 (1 drop)-Given        2111 (1  "drop)-Given        2133 (1 drop)-Given        [ ] 2200           lidocaine (LMX4) kit  Freq: EVERY 1 HOUR PRN Route: Top  PRN Reason: pain  PRN Comment: with VAD insertion or accessing implanted port.  Start: 12/13/17 2242   Admin Instructions: Do NOT give if patient has a history of allergy to any local anesthetic or any \"bud\" product.   Apply 30 minutes prior to VAD insertion or port access.  MAX Dose:  2.5 g (  of 5 g tube)               lidocaine 1 % 1 mL  Dose: 1 mL Freq: EVERY 1 HOUR PRN Route: OTHER  PRN Comment: mild pain with VAD insertion or accessing implanted port  Start: 12/13/17 2242   Admin Instructions: Do NOT give if patient has a history of allergy to any local anesthetic or any \"bud\" product. MAX dose 1 mL subcutaneous OR intradermal in divided doses.               LORazepam (ATIVAN) 2 MG/ML (HIGH CONC) solution 0.26-0.5 mg  Dose: 0.26-0.5 mg Freq: EVERY 6 HOURS PRN Route: PO  PRN Reason: anxiety  Start: 12/23/17 1308         2026 (0.26 mg)-Given            magnesium hydroxide (MILK OF MAGNESIA) suspension 30 mL  Dose: 30 mL Freq: DAILY PRN Route: PO  PRN Reason: constipation  Start: 12/16/17 1619   Admin Instructions: Shake well.               magnesium sulfate 2 g in NS intermittent infusion ([facility] or [facility])  Dose: 2 g Freq: DAILY PRN Route: IV  PRN Reason: magnesium supplementation  Start: 12/20/17 1707   Admin Instructions: For Serum Mg++ 1.6 - 2 mg/dL  Give 2 g and recheck magnesium level next AM.               magnesium sulfate 4 g in 100 mL sterile water (premade)  Dose: 4 g Freq: EVERY 4 HOURS PRN Route: IV  PRN Reason: magnesium supplementation  Start: 12/20/17 1707   Admin Instructions: For serum Mg++ less than 1.6 mg/dL  Give 4 g and recheck magnesium level 2 hours after dose, and next AM.               melatonin tablet 3 mg  Dose: 3 mg Freq: AT BEDTIME PRN Route: PO  PRN Reason: sleep  Start: 12/13/17 2242   Admin Instructions: Do not give unless at least 6 hours of " uninterrupted sleep is expected.               multivitamin, therapeutic with minerals (THERA-VIT-M) tablet 1 tablet  Dose: 1 tablet Freq: DAILY Route: PO  Start: 12/21/17 0900     0803 (1 tablet)-Given        0848 (1 tablet)-Given        (1031)-Not Given        (1102)-Not Given        (0844)-Not Given        (0905)-Not Given           naloxone (NARCAN) injection 0.1-0.4 mg  Dose: 0.1-0.4 mg Freq: EVERY 2 MIN PRN Route: IV  PRN Reason: opioid reversal  Start: 12/13/17 2242   Admin Instructions: For respiratory rate LESS than or EQUAL to 8.  Partial reversal dose:  0.1 mg titrated q 2 minutes for Analgesia Side Effects Monitoring Sedation Level of 3 (frequently drowsy, arousable, drifts to sleep during conversation).Full reversal dose:  0.4 mg bolus for Analgesia Side Effects Monitoring Sedation Level of 4 (somnolent, minimal or no response to stimulation).  For ordered doses up to 2mg give IVP. Give each 0.4mg over 15 seconds in emergency situations. For non-emergent situations further dilute in 9mL of NS to facilitate titration of response.               OLANZapine zydis (zyPREXA) ODT half-tab 2.5-5 mg  Dose: 2.5-5 mg Freq: EVERY 6 HOURS PRN Route: SL  PRN Reason: agitation  PRN Comment: delirium, nausea  Start: 12/24/17 1823   Admin Instructions: Use if other ordered anti-nausea medications are ineffective.  With dry hands, peel back foil backing and gently remove tablet; do not push oral disintegrating tablet through foil backing; administer immediately on tongue and oral disintegrating tablet dissolves in seconds; then swallow with saliva; liquid not required.               omeprazole (priLOSEC) CR capsule 20 mg  Dose: 20 mg Freq: EVERY MORNING BEFORE BREAKFAST Route: PO  Start: 12/14/17 0730    0636 (20 mg)-Given        0727 (20 mg)-Given        0555 (20 mg)-Given               0633 (20 mg)-Given        (1101)-Not Given        (0745)-Not Given        0634 (20 mg)-Given           ondansetron (ZOFRAN-ODT) ODT tab  4 mg  Dose: 4 mg Freq: EVERY 6 HOURS PRN Route: PO  PRN Reasons: nausea,vomiting  Start: 12/13/17 2242   Admin Instructions: This is Step 1 of nausea and vomiting management.  If nausea not resolved in 15 minutes, go to Step 2 prochlorperazine (COMPAZINE). Do not push through foil backing. Peel back foil and gently remove. Place on tongue immediately. Administration with liquid unnecessary              Or  ondansetron (ZOFRAN) injection 4 mg  Dose: 4 mg Freq: EVERY 6 HOURS PRN Route: IV  PRN Reasons: nausea,vomiting  Start: 12/13/17 2242   Admin Instructions: This is Step 1 of nausea and vomiting management.  If nausea not resolved in 15 minutes, go to Step 2 prochlorperazine (COMPAZINE).  Irritant. For ordered doses up to 4 mg, give IV Push undiluted over 2-5 minutes.               oxyCODONE HCl (ROXICODONE INTENSOL) 10 MG/0.5ML (HIGH CONC) solution 7.6 mg  Dose: 7.6 mg Freq: EVERY 4 HOURS Route: PO  Start: 12/26/17 1030   Admin Instructions: CAUTION: solution is 20 times more concentrated than standard solution. Verify dose volume.           1043 (7.6 mg)-Given       [ ] 1445       [ ] 1845       [ ] 2245           oxyCODONE IR (ROXICODONE) half-tab 5-7.5 mg  Dose: 5-7.5 mg Freq: EVERY 2 HOURS PRN Route: PO  PRN Reason: moderate to severe pain  PRN Comment: or dyspnea  Start: 12/24/17 1822        1852 (7.5 mg)-Given       2110 (7.5 mg)-Given                                                                    Or  oxyCODONE HCl (ROXICODONE INTENSOL) 10 MG/0.5ML (HIGH CONC) solution 5-7.6 mg  Dose: 5-7.6 mg Freq: EVERY 2 HOURS PRN Route: SL  PRN Reason: moderate to severe pain  PRN Comment: or dyspnea  Start: 12/24/17 1822   Admin Instructions: CAUTION: solution is 20 times more concentrated than standard solution. Verify dose volume.                        1138 (5 mg)-Given       1429 (5 mg)-Given       1649 (5 mg)-Given       1847 (7.6 mg)-Given       2132 (7.6 mg)-Given        0023 (7.6 mg)-Given       0635 (5  mg)-Given       1300 (7.6 mg)-Given           oxyCODONE IR (ROXICODONE) tablet 5 mg  Dose: 5 mg Freq: EVERY 3 HOURS PRN Route: PO  PRN Reason: moderate to severe pain  Start: 12/19/17 1349    0241 (5 mg)-Given       0636 (5 mg)-Given       0948 (5 mg)-Given       2207 (5 mg)-Given        0129 (5 mg)-Given       0454 (5 mg)-Given       1636 (5 mg)-Given       2059 (5 mg)-Given       2342 (5 mg)-Given        0253 (5 mg)-Given       0555 (5 mg)-Given       1626 (5 mg)-Given        0151 (5 mg)-Given       1553 (5 mg)-Given       2245 (5 mg)-Given        0203 (5 mg)-Given       1604 (5 mg)-Given             Patient is already receiving anticoagulation with heparin, enoxaparin (LOVENOX), warfarin (COUMADIN)  or other anticoagulant medication  Freq: CONTINUOUS PRN Route: XX  Start: 12/13/17 2243              polyethylene glycol (MIRALAX/GLYCOLAX) Packet 17 g  Dose: 17 g Freq: DAILY PRN Route: PO  PRN Reason: constipation  Start: 12/13/17 2242   Admin Instructions: Give in 8oz of  water, juice, or soda. Hold for loose stools.  This is the second step of a three step constipation treatment protocol.  1 Packet = 17 grams. Mixed prescribed dose in 8 ounces of water. Follow with 8 oz. of water.               predniSONE (DELTASONE) tablet 15 mg  Dose: 15 mg Freq: DAILY Route: PO  Start: 12/17/17 0900    0922 (15 mg)-Given        0803 (15 mg)-Given        0848 (15 mg)-Given        (1031)-Not Given        (1101)-Not Given        (0844)-Not Given        (0905)-Not Given           senna-docusate (SENOKOT-S;PERICOLACE) 8.6-50 MG per tablet 1 tablet  Dose: 1 tablet Freq: DAILY Route: PO  Start: 12/14/17 0900    1043 (1 tablet)-Given        0803 (1 tablet)-Given        0848 (1 tablet)-Given        (1031)-Not Given        (1102)-Not Given        (0845)-Not Given        (0905)-Not Given           sodium chloride (PF) 0.9% PF flush 3 mL  Dose: 3 mL Freq: EVERY 1 HOUR PRN Route: IK  PRN Reason: line flush  PRN Comment: for peripheral IV  flush post IV meds  Start: 12/13/17 2242             Discontinued Medications  Medications 12/20/17 12/21/17 12/22/17 12/23/17 12/24/17 12/25/17 12/26/17         Dose: 7.6 mg Freq: EVERY 4 HOURS Route: PO  Start: 12/26/17 0915   End: 12/26/17 1032   Admin Instructions: CAUTION: solution is 20 times more concentrated than standard solution. Verify dose volume.                  1032-Med Discontinued

## 2017-12-13 NOTE — ED PROVIDER NOTES
History     Chief Complaint:  Arm Pain and Generalized Weakness    HPI   The complete history is not available secondary to patient's condition; much of the history was provided by EMS.    Chuyita Gayle is a 85 year old female with a history of Afib on Coumadin who presents via EMS for right arm pain and swelling and generalized weakness. EMS reports that the patient lives in the independent section of an assisted living facility. Nurses there noticed right arm pain and swelling along with worsening gait for the past few days as she has been leaning to one side on ambulating with a walker. The patient reported to EMS that she fell but the fall was unwitnessed by staff; she was found sitting in her recliner. She was given 60 mg Fentanyl en route and presents for further evaluation and treatment. The patient reports right elbow pain and swelling, a headache and head/neck stiffness, neck pain, and back pain.She denies abdominal pain or chest pain. Of note, the patient was seen on 11/20 for right shoulder pain; the Xray obtained on that visit were unremarkable but the MRI showed degeneration, osteonecrosis, & articular surface collapse of the right shoulder, as noted below.      Imaging from 11/20  MRI Shoulder, Right  1.  Examination was ended early due to patient discomfort and motion.  2.  Abnormality in the humeral head is again seen with associated severe degenerative change and flattening and likely fracturing of the subchondral plate of the humeral head. Findings can be seen with a combination of degeneration, osteonecrosis and ongoing articular surface fracturing/collapse. Underlying neoplastic etiology is felt to be less likely, however it is not entirely excluded. Recommend short-term follow-up with CT if the patient cannot undergo a completion MRI.  3.  Intramuscular edema within the supraspinatus and infraspinatus is either related to a strain, neurogenic edema or contusion.  CT Shoulder, Right:    1.  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  2.  Prominent lucent lesion in the humeral head may simply represent a large atypical degenerative subchondral cyst, however, further evaluation with MRI would be helpful.  Xray Shoulder, Right  No acute fractures or dislocation, but there is irregular lucency with partial sclerosis in the humeral head measuring 2.7 x 2.6 cm. An osteolytic bone lesion or neoplasm is not excluded. Please consider further evaluation with cross-sectional imaging. There are degenerative changes in the glenohumeral joint and mild degenerative change in the AC joint.     Allergies:  Nabumetone  Sulfa Drugs  Vioxx [Rofecoxib]  Celecoxib  Nabumetone  Sulfa Drugs  Vioxx [Rofecoxib]  Celebrex [Celecoxib]      Medications:    Norco  Tenormin  Tylenol  Breo inhaler  Neurontin  Prilosec  Coumadin  Naprosyn  Valtrex  Ativan  Albuterol inhaler     Past Medical History:    Afib  Anxiety  Fibromyalgia  Asthma    Past Surgical History:    Back surgery  Cholecystectomy  GI surgery  Orthopedic surgery    Family History:    History review. No contributing family history.     Social History:  Smoking status: no  Alcohol use: no   PCP: Pallas, Kenneth G   Patient presents via EMS.   Marital Status:       Review of Systems   Unable to perform ROS: Other   Patient is mostly unresponsive to questions and provided unclear responses to questions.    Physical Exam     Patient Vitals for the past 24 hrs:   BP Temp Temp src Pulse Heart Rate Resp SpO2   12/13/17 2115 165/84 - - - - - 100 %   12/13/17 2100 176/87 - - - - - 99 %   12/13/17 2045 161/78 - - - - - 98 %   12/13/17 2030 154/83 - - - - - -   12/13/17 2015 151/77 - - - - - -   12/13/17 2000 156/83 - - - - - 96 %   12/13/17 1945 - - - - - - 96 %   12/13/17 1848 - 101.2  F (38.4  C) Temporal - - - -   12/13/17 1840 - - - - - - 91 %   12/13/17 1835 - - - - - - 92 %   12/13/17 1830 - - - - - - 90 %   12/13/17 1825 - - -  - - - 93 %   12/13/17 1820 - - - - - - 94 %   12/13/17 1815 - - - - - - 91 %   12/13/17 1810 - - - - - - 96 %   12/13/17 1805 - - - - - - 96 %   12/13/17 1725 165/90 99.2  F (37.3  C) Oral 75 75 16 95 %      Physical Exam  Constitutional:  Well developed, Well nourished, frail, elderly female, mildly confused, mild distress secondary to pain.   HENT:  Bilateral external ears normal, Mucous membranes very dry, Nose normal. Neck- Normal range of motion, Supple  Respiratory:  Normal breath sounds, No respiratory distress, No wheezing,  Cardiovascular:  Normal heart rate, Normal rhythm, No murmurs,    GI:  Bowel sounds normal, Soft, No tenderness,   Musculoskeletal:  Intact distal pulses, No edema, grossly unremarkable range of motion. Patient has severe right upper extremity pain, cannot range the arm at any level secondary to pain. Appears to have diffuse swelling over right wrist.  Right radial pulse is strong and intact.  1Diffuse tenderness over right elbow, shoulder, and wrist. Left upper extremity unremarkable. Bilateral lower extremities unremarkable  Integument:  Warm, Dry. NO rash or lesions seen over the right upper extremity  Neurologic:  Awake, oriented to place only, symmetric face, light to touch sensation and strength intact in bilateral upper and lower extremities.   Psychiatric:  Mood and affect normal.     Emergency Department Course   ECG (17:33):  Rate 74 bpm. AR interval 152. QT/QTc 394/437.   Sinus rhythm with premature atrial complexes. Nonspecific ST abnormality. Abnormal ECG.  Interpreted at 1733 by Trinity Santana MD.      Imaging:  Radiographic findings were communicated with the patient who voiced understanding of the findings.    Xray shoulder right, 3 views:  Advanced degenerative changes right shoulder and mild  degenerative changes right AC joint. Nothing acute.  As read by Radiology.     Xray Elbow right, 3 views:  Probable tiny avulsion of the radial head.  As read by Radiology.      X-ray Chest, 2 views:  Nothing acute. No infiltrates.   As read by Radiology.     Xray wrist right, 3 views:  Degenerative changes right wrist. Nothing acute. No  fracture identified.  As read by Radiology.     Xray Thoracic spine, 3 views:  Degenerative changes throughout the thoracic spine but  nothing clearly acute.  As read by Radiology.     Xray Lumbar spine, 2-3 views:  Advanced generalized degenerative disease of the lumbar  spine without acute appearing abnormality. No fracture.  As read by Radiology.     CT-scan Head w/o contrast:  1. Atrophy and chronic white matter disease.  2. 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  3. No interval change.  Result per radiology.      Laboratory:  CBC: WBC 11.6 (H), o/w WNL (HGB 13.7,  )   INR: 3.2 (H)  BMP: Glucose 140 (H), o/w WNL (Creatinine 0.69)  Troponin: <0.015  Lactic acid: 0.6 (L)  Blood culture x2 pending    UA: 100 protein albumin, 20 ketones, 4 Urobilinogen, small Leukocyte Esterase, 104 WBC, mucous present, o/w WNL   Urine culture pending    Interventions:   1801: Dilaudid 0.5 mg IV  1814: Dilaudid 0.5 mg IV  1834: Dilaudid 0.5 mg IV  1948: Tylenol 650 mg PO  2110: Oxycodone 5 mg PO  2153: Rocephin 1 g IV  The patient's symptoms were partially improved with parenteral narcotics.    Emergency Department Course:  Past medical records, nursing notes, and vitals reviewed.  1734: I performed an exam of the patient and obtained history, as documented above. GCS 15.   The patient was taken for CT and Xray, see imaging results above.    IV inserted and blood drawn.   2053: I rechecked the patient. She seems a little less confused, is still uncomfortable but notes pain medication has been helping the right arm pain.  Discussed findings including the finding of fracture, UTI, and need for admission. Findings and plan explained to the Patient who consents to admission.     2154: Discussed the patient with Dr. Waterman, who will admit the patient to a  Medical Telemetry bed for further monitoring, evaluation, and treatment.      Impression & Plan      CMS Diagnoses:   The patient has signs of Severe Sepsis as evidenced by:    1. 2 SIRS criteria, AND  2. Suspected infection, AND   3. Organ dysfunction: altered mental status, fever, elevated white count    Time severe sepsis diagnosis confirmed = 2100 as this was the time when AMS, Fever, Elevated white count      3 Hour Severe Sepsis Bundle Completion:  1. Initial Lactic Acid Result:   Recent Labs   Lab Test  12/13/17   1820  02/14/17 2031   LACT  0.6*  1.1     2. Blood Cultures before Antibiotics: Yes  3. Broad Spectrum Antibiotics Administered: Yes     Anti-infectives (Future)    Start     Dose/Rate Route Frequency Ordered Stop    12/13/17 2056  cefTRIAXone in d5w (ROCEPHIN) intermittent infusion 1 g      1 g  over 30 Minutes Intravenous ONCE 12/13/17 2055          4. 500 ml of IV fluids.    Medical Decision Making:  Chuyita Gayle is a 85 year old female who presented for injury after a fall and injuring her Right forearm. Xray obtained and is positive for non-displaced radial head fracture as above. No neurovascular compromise the patient did have persistent significant pain.  This patient additionally has symptoms consistent with a urinary tract infection.  Urinalysis is suggestive of the infection.  There has been no back/flank pain or significant abdominal pain.  There is no clinical evidence of pyelonephritis, appendicitis,  or diverticulitis.  The patient developed a fever and was found to have an elevated white count during her stay in the ED; for this reason, broad spectrum antibiotics were started and sepsis protocol was initiated. The patient will be admitted for further observation and care.  Overall, she appears to have some chronic right shoulder pain, developed a UTI which may contribute to her general weakness, and had a fall today leading to the radial head fracture.  Given the fracture, her  significant pain, and the generalized weakness and falls risk, she will admission for further evaluation as well.  She has remained hemodynamically stable, does not meet criteria for severe sepsis or septic shock.  She agrees to admission.    Diagnosis:    ICD-10-CM    1. Dysuria R30.0 Blood culture     Blood culture       Disposition:  Admitted to medical telemetry    Paige Champion  12/13/2017   [facility] EMERGENCY DEPARTMENT  I, Paige Champion, am serving as a scribe at 5:34 PM on 12/13/2017 to document services personally performed by Trinity Santana MD based on my observations and the provider's statements to me.        Trinity Santana MD  12/13/17 [id number]

## 2017-12-13 NOTE — IP AVS SNAPSHOT
` `     [place] [address] MEDICAL SURGICAL: [phone]                 INTERAGENCY TRANSFER FORM - NOTES (H&P, Discharge Summary, Consults, Procedures, Therapies)   2017                    Hospital Admission Date: 2017  EDISON WATTS   : 1932  Sex: Female        Patient PCP Information     Provider PCP Type    Sen Flores MD General      History & Physicals     No notes of this type exist for this encounter.         Discharge Summaries      Discharge Summaries by Dinah Gilman MD at 2017 12:41 PM     Author:  Dinah Gilman MD Service:  Hospitalist Author Type:  Physician    Filed:  2017 12:42 PM Date of Service:  2017 12:41 PM Creation Time:  2017 12:28 PM    Status:  Addendum :  Dinah Gilman MD (Physician)         [facility]    Discharge Summary  Hospitalist    Date of Admission:  2017  Date of Discharge:[CJ1.1]  2017[CJ1.2]  Discharging Provider:[CJ1.1] Dinah Gilman MD[CJ1.3]  Date of Service (when I saw the patient):[CJ1.1] 17[CJ1.2]    Discharge Diagnoses[CJ1.3]      1. Severe diffuse pain     2. Right distal radius avulsion fracture. CT scan of the right hand completed  shows no new or additional fractures.     3. Right shoulder pain, present from well before the hospitalization.     4. Possible UTI, no specific signs of sepsis in my opinion. Completed Ceftriaxone     5. Supratherapeutic INR on admission.  Coumadin was discontinued during this admission    6. Atrial fibrillation for which she was chronically anticoagulated, recent issues w/ a-fib w/ RVR     7.  Severe malnutrition     8.  Severe chronic pain syndrome   [CJ1.1]    History of Present Illness[CJ1.3]   Edison Watts is an 85 year old female who presented with with complex medical history including chronic pain syndrome, atrial fibrillation, anxiety, hypertension, fibromyalgia was admitted after a fall.  She has  "significant pain.Her status was changed to comfort care during hospital course.  Please see H&P for details.[CJ1.1]    Hospital Course[CJ1.3]   Summary of Stay: Chuyita Gayle is a 85 year old female w/ hx of chronic pain syndrome, a-fib on coumadin, anxiety, fibromyalgia, HTN, asthma who presented on 12/13/2017 following a fall. She reportedly has been requiring more and more assistance at her KATE even with transfers to the point where per her granddaughter she essentially was requiring SNF type care already and was needing heavy assist of two or a lift (which she often refused to use and was basically just bed-bound/chair bound).  She reportedly fell during a transfer and she complained of right wrist and elbow pain and in the ED was found to have evidence of UTI with early Sepsis.  I note that in the ED, the patient was found to have a mildly elevated WBC, but no left shift. In addition, she had LGT and UA is suggestive of infection, though LE is \"small\". Ketones were positive, possibly related to decreased oral intake.  She was started on IV ceftriaxone and admitted for further care.  Following admission Chuyita was noted to have fairly profound diffuse pain out of proportion to what may have been expected based on her fall.  Based on markedly elevated inflammatory markers and fairly diffuse MSK pain with report of prior hx of  \"inflammatory arthritis\" she was empirically started on steroids for suspected PMR (initially solumedrol 1 mg/kg once IV, then Prednisone 20 mg po daily w/ taper).  Patient continued to have generalized pain with weakness.  She has been declining during her hospital course. The overall goal for her is comfort/minimizing suffering.  On 12/20 patient developed A. fib RVR and was started on diltiazem drip. This was discontinued after hair status post change to comfort care. The plan is to discharge her to assisted living facility with hospice today.      Problem List:   1. Severe diffuse " pain.  Patient has been having multiple complaints in regards to her pain.  --Patient's status was changed to comfort care/hospice on 12/22 after after discussion with his family.  Discharge pain medications have been reviewed by pain/palliative care team. Discharge plan was discussed with her family at bedside.     2. Right distal radius avulsion fracture. CT scan of the right hand completed 12/16 shows no new or additional fractures.      3. Right shoulder pain, present from well before the hospitalization.      4. Possible UTI, no specific signs of sepsis in my opinion. Completed Ceftriaxone. (Aerococcus growing on the first day), pan-sensitive (including ceftriaxone).      5. Supratherapeutic INR on admission.  Coumadin was discontinued during this admission.  Risks of warfarin likely exceed benefits in the short term and after discussion w/ family they've decided to stop this indefinitely.     6. Atrial fibrillation for which she was chronically anticoagulated, recent issues w/ a-fib w/ RVR. With her tendency to fall planned to discontinue Warfarin. Now comfort care so per discussion w/ family will stop dilt drip and further monitoring/workup with the understanding that she could decompensate significantly.  --continue baseline atenolol 50 mg but hold off on more aggressive measures.     7.  Severe malnutrition     8.  Severe chronic pain syndrome  Code Status: DNR/DNI and comfort cares.  Discharge Dispo: Patient was discharged back to [facility] w/ Hospice care.   [CJ1.1]  Significant Results and Procedures[CJ1.3]   Results for orders placed or performed during the hospital encounter of 12/13/17   CT Head w/o Contrast    Narrative    CT HEAD WITHOUT CONTRAST  12/13/2017 7:08 PM    HISTORY: Fall, altered mental status.      TECHNIQUE: Scans were obtained through the head without IV contrast.  Radiation dose for this scan was reduced using automated exposure  control, adjustment of the mA and/or kV according to  patient size, or  iterative reconstruction technique.    COMPARISON: 2/19/2017     FINDINGS: Moderate atrophy. 1 cm area of old infarction right frontal  periventricular region extending into the right basal ganglia.  Moderate chronic white matter disease likely small vessel ischemic  change. No hemorrhage, mass lesion, or focal area of acute infarction  identified. Paranasal sinuses are normal. No bony abnormality. No  change since prior head CT.      Impression    IMPRESSION:   1. Atrophy and chronic white matter disease.  2. 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  3. No interval change.    KIM DALE MD   Shoulder XR, G/E 3 views, right    Narrative    SHOULDER RIGHT THREE OR MORE VIEWS   12/13/2017 7:30 PM     HISTORY: Arm pain.     COMPARISON: None.    FINDINGS: Advanced chronic degenerative changes right shoulder.  Associated hypertrophic changes and cystic degenerative changes within  the subchondral region of the humeral head. No fracture or acute  appearing abnormality. Degenerative changes right AC joint with slight  widening of the AC joint probably chronic.      Impression    IMPRESSION: Advanced degenerative changes right shoulder and mild  degenerative changes right AC joint. Nothing acute.    KIM DALE MD   Elbow XR, G/E 3 views, right    Narrative    ELBOW RIGHT THREE OR MORE VIEWS   12/13/2017 7:30 PM     HISTORY: Fall, pain.     COMPARISON: None.    FINDINGS: Slight avulsion of the superior aspect of the radial head on  the lateral view is suspected. Exam otherwise negative.      Impression    IMPRESSION: Probable tiny avulsion of the radial head.    KIM DALE MD   Wrist XR, G/E 3 views, right    Narrative    WRIST RIGHT THREE OR MORE VIEWS   12/13/2017 7:30 PM     HISTORY: Fall, pain.     COMPARISON: None.    FINDINGS: Chondrocalcinosis right wrist. Advanced degenerative changes  of the carpometacarpal articulation of the radial aspect of the right  wrist. Nothing  acute.      Impression    IMPRESSION: Degenerative changes right wrist. Nothing acute. No  fracture identified.    KIM DALE MD   XR Chest 1 View    Narrative    CHEST ONE VIEW   12/13/2017 7:30 PM     HISTORY: Weakness.     COMPARISON: None.    FINDINGS: Degenerative changes right shoulder and prior left shoulder  arthroplasty. Heart and lungs negative.      Impression    IMPRESSION: Nothing acute. No infiltrates.    KIM DALE MD   Thoracic spine XR, 3 views    Narrative    THORACIC SPINE THREE VIEW   12/13/2017 7:30 PM     HISTORY: Fall, pain.     COMPARISON: None.    FINDINGS: Minimal hypertrophic changes thoracic spine. Generalized  mild narrowing of all of the thoracic disc spaces on a degenerative  basis. No fracture or acute appearing abnormality.      Impression    IMPRESSION: Degenerative changes throughout the thoracic spine but  nothing clearly acute.    KIM DALE MD   Lumbar spine XR, 2-3 views    Narrative    LUMBAR SPINE TWO - THREE VIEWS   12/13/2017 7:30 PM     HISTORY: Fall, pain;.     COMPARISON: None.    FINDINGS: Generalized narrowing of all lumbar interspaces with vacuum  sign of degenerative disc disease at each level. Grade I  spondylolisthesis of L4 on L5 probably related to facet joint disease  in the mid and lower lumbar spine. No fracture or acute abnormality.  Surgical clips right upper quadrant. Mild lumbar curve concave to the  left.      Impression    IMPRESSION: Advanced generalized degenerative disease of the lumbar  spine without acute appearing abnormality. No fracture.    KIM DALE MD   CT Hand Right w/o Contrast    Narrative    CT RIGHT HAND WITHOUT CONTRAST  12/16/2017 9:22 AM     HISTORY:  Pain in anatomic snuffbox, right thumb base and at thenar  MCP as well as intense pain and swelling at third MCP. Status post  fall.      TECHNIQUE: Axial images with reconstructions. Radiation dose for this  scan was reduced using automated exposure control,  adjustment of the  mA and/or kV according to patient size, or iterative reconstruction  technique.     FINDINGS: Suboptimal exam due to motion. Chondrocalcinosis, consistent  with calcium pyrophosphate deposition disease.  Prominent degenerative  arthrosis at the thumb CMC joint. Some degenerative arthrosis at the  thumb MCP and IP joints. Distal to the CMC joints is not well  evaluated due to motion. There is a subchondral cystic lesion or  intraosseous ganglion within the proximal scaphoid. No definite  scaphoid fracture is appreciated. If pain persists in this area, I  would recommend short-term radiographic followup or MRI.      Impression    IMPRESSION:  1. Exam compromised by motion.  2. No obvious scaphoid fracture is appreciated.  3. Additional findings discussed above.     STEFANIE SIM MD[CJ1.4]         Pending Results[CJ1.3]   None[CJ1.1]    Code Status[CJ1.3]   Comfort Care[CJ1.1]       Primary Care Physician   Sen Flores        Discharge Disposition[CJ1.3]   Discharged to [facility] with hospice.  Condition at discharge: Guarded[CJ1.1]    Consultations This Hospital Stay   SOCIAL WORK IP CONSULT  PHYSICAL THERAPY ADULT IP CONSULT  OCCUPATIONAL THERAPY ADULT IP CONSULT  ORTHOPEDIC SURGERY IP CONSULT  PHARMACY TO DOSE WARFARIN  ORTHOPEDIC SURGERY IP CONSULT  WOUND OSTOMY CONTINENCE NURSE  IP CONSULT  PALLIATIVE CARE ADULT IP CONSULT  SPIRITUAL HEALTH SERVICES IP CONSULT  PHYSICAL THERAPY ADULT IP CONSULT  OCCUPATIONAL THERAPY ADULT IP CONSULT  SOCIAL WORK IP CONSULT    Time Spent on this Encounter[CJ1.3]   I, Dinah Gilman MD, personally saw the patient today and spent greater than 30 minutes discharging this patient.[CJ1.1]    Discharge Orders     Reason for your hospital stay   Severe diffuse pain     Follow-up and recommended labs and tests    Follow up with hospice     Activity   Your activity upon discharge: activity as tolerated     DNR/DNI   Comfort care     Diet   Follow this diet upon  discharge: Orders Placed This Encounter     Snacks/Supplements Adult: Ensure Plus (Adult); Between Meals     Combination Diet Regular Diet Adult       Discharge Medications   Current Discharge Medication List      START taking these medications    Details   MEDICATION INSTRUCTION If care facility cannot accept or use ranges, facility is instructed to use lower end of dosing range    Associated Diagnoses: Chronic pain syndrome      acetaminophen (TYLENOL) 325 MG tablet Take 2 tablets (650 mg) by mouth every 4 hours as needed for mild pain  Qty: 100 tablet, Refills: 0    Associated Diagnoses: Chronic pain syndrome      acetaminophen (TYLENOL) 650 MG Suppository Place 1 suppository (650 mg) rectally every 4 hours as needed for fever or mild pain (Do not exceed 4000 mg total acetaminophen per day.) Unwrap prior to insertion.  Qty: 12 suppository, Refills: 1    Associated Diagnoses: Chronic pain syndrome      atropine 1 % ophthalmic solution Take 2 drops by mouth, place under tongue or place inside cheek every 2 hours as needed for other (terminal respiratory secretions) Not for ophthalmic use.  Qty: 5 mL, Refills: 1    Associated Diagnoses: Chronic pain syndrome      bisacodyl (DULCOLAX) 10 MG Suppository Unwrap and insert 1 suppository rectally twice daily as needed for constipation.  Qty: 12 suppository, Refills: 1    Associated Diagnoses: Chronic pain syndrome      oxyCODONE IR (ROXICODONE) 5 MG tablet Take 1 tablet (5 mg) by mouth every 4 hours May take additional 5 mg every 1 hour prn for pain, dyspnea, or respiratory rate greater than 20  Qty: 30 tablet, Refills: 0    Associated Diagnoses: Chronic pain syndrome      LORazepam (ATIVAN) 0.5 MG tablet Take 1 tablet (0.5 mg) by mouth, place under tongue or insert rectally every 4 hours as needed for anxiety (restlessness)  Qty: 30 tablet, Refills: 1    Associated Diagnoses: Chronic pain syndrome      melatonin 3 MG tablet Take 1 tablet (3 mg) by mouth nightly as  needed for sleep    Associated Diagnoses: Insomnia, unspecified type      diclofenac (VOLTAREN) 1 % GEL topical gel Place 2 g onto the skin 4 times daily Over painful joints    Associated Diagnoses: Chronic pain syndrome      polyethylene glycol (MIRALAX/GLYCOLAX) Packet Take 17 g by mouth daily as needed for constipation  Qty: 7 packet    Associated Diagnoses: Drug-induced constipation      predniSONE (DELTASONE) 5 MG tablet For suspected polymyalgia rheumatica.  Take 10 mg daily for 4 weeks then taper to 5 mg daily for 4 weeks then stop unless directed to continue by Rheumatology.  Qty: 84 tablet    Associated Diagnoses: Myalgia         CONTINUE these medications which have NOT CHANGED    Details   !! Menthol-Zinc Oxide (CALMOSEPTINE EX) Externally apply topically 2 times daily       latanoprost (XALATAN) 0.005 % ophthalmic solution Place 1 drop into both eyes At Bedtime       OMEPRAZOLE PO Take 20 mg by mouth every morning       Misc Natural Products (OSTEO BI-FLEX ADV DOUBLE ST PO) Take 1 tablet by mouth 2 times daily       cetirizine (ZYRTEC) 10 MG tablet Take 10 mg by mouth daily      GABAPENTIN PO Take 600 mg by mouth At Bedtime       multivitamin, therapeutic with minerals (MULTI-VITAMIN) TABS tablet Take 1 tablet by mouth daily      VITAMIN D, CHOLECALCIFEROL, PO Take 1,000 Units by mouth daily       Albuterol (VENTOLIN IN)       trolamine salicylate (ASPERCREME) 10 % cream Apply topically 2 times daily      fluticasone-vilanterol (BREO ELLIPTA) 100-25 MCG/INH oral inhaler Inhale 1 puff into the lungs daily      fluticasone (FLONASE) 50 MCG/ACT spray Spray 2 sprays into both nostrils daily      !! senna-docusate (SENOKOT-S;PERICOLACE) 8.6-50 MG per tablet Take 1 tablet by mouth daily      !! menthol-zinc oxide (CALMOSEPTINE) 0.44-20.625 % OINT ointment Apply topically every hour as needed for skin protection      pramox-pe-glycerin-petrolatum (PREPARATION H) 1-0.25-14.4-15 % CREA cream Place rectally as  needed for hemorrhoids      !! senna-docusate (SENOKOT-S;PERICOLACE) 8.6-50 MG per tablet Take 1 tablet by mouth daily as needed for constipation      albuterol (PROAIR HFA/PROVENTIL HFA/VENTOLIN HFA) 108 (90 BASE) MCG/ACT Inhaler Inhale 2 puffs into the lungs 2 times daily as needed for shortness of breath / dyspnea or wheezing      atenolol (TENORMIN) 25 MG tablet Take 50 mg by mouth 2 times daily        !! - Potential duplicate medications found. Please discuss with provider.      STOP taking these medications       Warfarin Sodium (JANTOVEN PO) Comments:   Reason for Stopping:         Fish Oil-Cholecalciferol (FISH OIL + D3 PO) Comments:   Reason for Stopping:         TRAMADOL HCL PO Comments:   Reason for Stopping:         Omega-3 Fatty Acids (FISH OIL) 1200 MG capsule Comments:   Reason for Stopping:         HYDROcodone-acetaminophen (NORCO) 5-325 MG per tablet Comments:   Reason for Stopping:         HYDROcodone-acetaminophen (NORCO) 5-325 MG per tablet Comments:   Reason for Stopping:             Allergies   Allergies   Allergen Reactions     Nabumetone Rash     Sulfa Drugs Rash     Vioxx [Rofecoxib] Rash     Celecoxib      Nabumetone      Sulfa Drugs      Vioxx [Rofecoxib]      Celebrex [Celecoxib] Rash     Data[CJ1.3]   Most Recent 3 CBC's:[CJ1.1]  Recent Labs   Lab Test  12/21/17   0700  12/19/17   0800  12/14/17   0735   WBC  8.2  9.5  8.8   HGB  11.2*  12.4  11.9   MCV  100  98  100   PLT  362  369  253[CJ1.5]      Most Recent 3 BMP's:[CJ1.1]  Recent Labs   Lab Test  12/21/17   0700  12/20/17   1744  12/19/17   0800  12/15/17   0855   NA  137   --   136  135   POTASSIUM  3.6  5.0  3.6  3.9   CHLORIDE  103   --   102  105   CO2  27   --   27  22   BUN  12   --   12  17   CR  0.63   --   0.65  0.55   ANIONGAP  7   --   7  8   TRACEE  8.5   --   8.6  8.7   GLC  93   --   101*  114*[CJ1.5]     Most Recent 2 LFT's:[CJ1.1]  Recent Labs   Lab Test  12/14/17   0735  02/14/17 2031   AST  17  36   ALT  12  28    ALKPHOS  61  79   BILITOTAL  1.0  0.4[CJ1.5]     Most Recent INR's and Anticoagulation Dosing History:  Anticoagulation Dose History     Recent Dosing and Labs Latest Ref Rng & Units 2/14/2017 2/19/2017 12/13/2017 12/14/2017 12/15/2017 12/16/2017 12/17/2017    Warfarin 1 mg - - - - - 1 mg - -    INR 0.86 - 1.14 2.29(H) 2.25(H) 3.20(H) 3.57(H) 2.77(H) 4.25(H) 1.28(H)        Most Recent 3 Troponin's:[CJ1.1]  Recent Labs   Lab Test  12/13/17   1820  02/14/17 2031   TROPI  <0.015  <0.015  The 99th percentile for upper reference range is 0.045 ug/L.  Troponin values in   the range of 0.045 - 0.120 ug/L may be associated with risks of adverse   clinical events.[CJ1.5]       Most Recent Cholesterol Panel:[CJ1.1]No lab results found.[CJ1.5]  Most Recent 6 Bacteria Isolates From Any Culture (See EPIC Reports for Culture Details):[CJ1.1]  Recent Labs   Lab Test  12/13/17   2139  12/13/17   2135  12/13/17   1840  04/12/17   2350  02/14/17 2054  02/14/17 2047   CULT  No growth  No growth  >100,000 colonies/mL  Aerococcus urinae  Identification obtained by MALDI-TOF mass spectrometry research use only database. Test   characteristics determined and verified by the Infect[facility]   ([facility]) [place], [place].  *  >100,000 colonies/mL urogenital zackary Susceptibility testing not routinely done  <10,000 colonies/mL urogenital zackary  Susceptibility testing not routinely done    No growth[CJ1.5]     Most Recent TSH, T4 and A1c Labs:[CJ1.1]No lab results found.[CJ1.5]       Revision History        User Key Date/Time User Provider Type Action    > [N/A] 12/26/2017 12:42 PM Dinah Gilman MD Physician Addend     [N/A] 12/26/2017 12:42 PM Dinah Gilman MD Physician Addend     CJ1.5 12/26/2017 12:41 PM Dinah Gilman MD Physician Sign     CJ1.4 12/26/2017 12:38 PM Dinah Gilman MD Physician      CJ1.2 12/26/2017 12:34 PM Dinah Gilman MD Physician       CJ1.3 12/26/2017 12:33 PM Dinah Gilman MD Physician      CJ1.1 12/26/2017 12:28 PM Dinah Gilman MD Physician                      Consult Notes      Consults by Vandana Hagen APRN CNS at 12/19/2017  6:18 PM     Author:  Vandana Hagen APRN CNS Service:  Palliative Author Type:  Clinical Nurse Specialist    Filed:  12/26/2017 10:34 AM Date of Service:  12/19/2017  6:18 PM Creation Time:  12/19/2017  5:32 PM    Status:  Addendum :  Vandana Hagen APRN CNS (Clinical Nurse Specialist)         [facility]    Palliative Care Consultation   Text Page    Date of Admission:  12/13/2017    Assessment & Plan   Chuyita Gayle is a 85 year old female who was admitted on 12/13/2017. I was asked by Hospitalist Ramo Townsend MD to see this elderly, disable woman her with ?PMR.    Recommendations:  1.[AM1.1]  Confusion - patient does not demonstrate medical capacity     2.[AM1.2]  Pain - Reasonable to try Voltaren topically for right hand pain[AM1.3]     3.[AM1.2]  Goal[AM1.1]s[AM1.3] of Care:[AM1.1] DNR/DNI - Restorative care.[AM1.3]    Disease Process/es & Symptoms:  Chuyita Gayle is a 85 year old patient admitted[AM1.1] 12/13/2017 following a fall and was found to have a right radial avulsion which was place in a sling. She was noted to be febrile and was admitted for concern of early sepsis. The patient has a history of chronic pain syndrome, a-fib on coumadin, anxiety, fibromyalgia, HTN, and asthma.[AM1.2]    The following symptoms are noted by patient as concerning to her quality of life.[AM1.1]  Pain - right hand pain  Anxiety - tearful[AM1.2]     Psychosocial/Spiritual Needs:[AM1.1]  Consultation placed for  to follow. Consultation placed for  to follow.[AM1.2]    Decision-Making & Goals of Care:  Discussion/counseling today about goals of care/decisions:[AM1.1]   Met with Chuyita as she was resting in bed. She was tearful and unable  to give much insight into her recent health. She was able to explain that her right hand has been painful, so it seems reasonable to try topical Voltaren. I would be mindful to avoid further escalation of opiates given the patient's limited insight. I did leave a message for her granddaughter[AM1.2]/healthcare agent[AM1.4] Stacey Boothe at [phone] to call our offices at her earliest convenience.[AM1.2]      Patient has decision-making capacity Questionable  Patient has a Health Care Agent named in a legal Advance Directive document dated 3/23/2015. See name(s) and contact information in Health Care Agent/Legal Guardian section below.  Name: Stacey Boothe, Relationship: granddaughter, Phone(s): [phone].  First Alternate Name: Taylor Carty, Relationship: granddaughter, Phone(s): [phone].[AM1.4]    Patient has a completed health care directive available in the chart (Y/N):[AM1.1] Yes[AM1.4]  Physician orders for life-sustaining treatment (POLST) form is not completed[AM1.2]  Code Status:[AM1.1] Do not resuscitate / Do not intubate[AM1.2]     Findings & plan of care discussed with:[AM1.1] bedside nurse Arlin Strauss RN[AM1.2]  Follow-up plan from palliative team:[AM1.1] Will follow closely during this hospitalization[AM1.2]  Thank you for involving us in the patient's care.[AM1.1]     Vandana Hagen MS, RN, CNS, APRN, ACHPN, FAACVPR  Pain and Palliative Care  Pager [phone]  Office [phone][AM1.2]       Time Spent on this Encounter   I spent[AM1.1]  30[AM1.2] minutes in assessment of the patient and discussion with the patient and family. Another[AM1.1] 35[AM1.2] minutes in review of chart, documentation and discussion with the health care team.    Reason for Consult   Reason for consult: I was asked by Hospitalist Ramo Townsend MD to see this elderly, disable woman her with ?PMR.    Primary Care Physician   Sen Flores    Chief Complaint[AM1.1]   Arm Pain and Generalized  Weakness[AM1.5]    History is obtained from the patient and electronic health record[AM1.2]      Past Medical History[AM1.1]   I have reviewed this patient's medical history and updated it with pertinent information if needed.[AM1.2]   Past Medical History:   Diagnosis Date     A-fib (H)      Anxiety      Fibromyalgia      Hypertension      Uncomplicated asthma[AM1.6]        Past Surgical History[AM1.1]   I have reviewed this patient's surgical history and updated it with pertinent information if needed.[AM1.2]  Past Surgical History:   Procedure Laterality Date     BACK SURGERY       CHOLECYSTECTOMY       GI SURGERY       ORTHOPEDIC SURGERY[AM1.7]         Prior to Admission Medications   Prior to Admission Medications   Prescriptions Last Dose Informant Patient Reported? Taking?   Albuterol (VENTOLIN IN)   Yes Yes   Fish Oil-Cholecalciferol (FISH OIL + D3 PO)   Yes Yes   GABAPENTIN PO 12/12/2017 at Unknown time  Yes Yes   Sig: Take 600 mg by mouth At Bedtime    HYDROcodone-acetaminophen (NORCO) 5-325 MG per tablet 12/12/2017 at Unknown time  Yes Yes   Sig: Take 1 tablet by mouth At Bedtime   HYDROcodone-acetaminophen (NORCO) 5-325 MG per tablet   Yes Yes   Sig: Take 1 tablet by mouth every 6 hours as needed for moderate to severe pain   Menthol-Zinc Oxide (CALMOSEPTINE EX) 12/13/2017 at x1  Yes Yes   Sig: Externally apply topically 2 times daily    Misc Natural Products (OSTEO BI-FLEX ADV DOUBLE ST PO) 12/13/2017 at x1  Yes Yes   Sig: Take 1 tablet by mouth 2 times daily    OMEPRAZOLE PO 12/13/2017 at Unknown time  Yes Yes   Sig: Take 20 mg by mouth every morning    Omega-3 Fatty Acids (FISH OIL) 1200 MG capsule 12/13/2017 at Unknown time  Yes Yes   Sig: Take 1,200 mg by mouth daily   TRAMADOL HCL PO 12/13/2017 at x2  Yes Yes   Sig: Take 25 mg by mouth 3 times daily    VITAMIN D, CHOLECALCIFEROL, PO 12/13/2017 at Unknown time  Yes Yes   Sig: Take 1,000 Units by mouth daily    Warfarin Sodium (JANTOVEN PO)  2017 at Unknown time  Yes Yes   Si.5 mg on e, Wed, Fri, Sat, Sun   albuterol (PROAIR HFA/PROVENTIL HFA/VENTOLIN HFA) 108 (90 BASE) MCG/ACT Inhaler   Yes Yes   Sig: Inhale 2 puffs into the lungs 2 times daily as needed for shortness of breath / dyspnea or wheezing   atenolol (TENORMIN) 25 MG tablet 2017 at x1  Yes Yes   Sig: Take 50 mg by mouth 2 times daily    cetirizine (ZYRTEC) 10 MG tablet 2017 at Unknown time  Yes Yes   Sig: Take 10 mg by mouth daily   fluticasone (FLONASE) 50 MCG/ACT spray 2017 at Unknown time  Yes Yes   Sig: Spray 2 sprays into both nostrils daily   fluticasone-vilanterol (BREO ELLIPTA) 100-25 MCG/INH oral inhaler 2017 at Unknown time  Yes Yes   Sig: Inhale 1 puff into the lungs daily   latanoprost (XALATAN) 0.005 % ophthalmic solution 2017 at Unknown time  Yes Yes   Sig: Place 1 drop into both eyes At Bedtime    menthol-zinc oxide (CALMOSEPTINE) 0.44-20.625 % OINT ointment   Yes Yes   Sig: Apply topically every hour as needed for skin protection   multivitamin, therapeutic with minerals (MULTI-VITAMIN) TABS tablet 2017 at Unknown time  Yes Yes   Sig: Take 1 tablet by mouth daily   pramox-pe-glycerin-petrolatum (PREPARATION H) 1-0.25-14.4-15 % CREA cream   Yes Yes   Sig: Place rectally as needed for hemorrhoids   senna-docusate (SENOKOT-S;PERICOLACE) 8.6-50 MG per tablet 2017 at Unknown time  Yes Yes   Sig: Take 1 tablet by mouth daily   senna-docusate (SENOKOT-S;PERICOLACE) 8.6-50 MG per tablet   Yes Yes   Sig: Take 1 tablet by mouth daily as needed for constipation   trolamine salicylate (ASPERCREME) 10 % cream 2017 at am  Yes Yes   Sig: Apply topically 2 times daily      Facility-Administered Medications: None     Allergies   Allergies   Allergen Reactions     Nabumetone Rash     Sulfa Drugs Rash     Vioxx [Rofecoxib] Rash     Celecoxib      Nabumetone      Sulfa Drugs      Vioxx [Rofecoxib]      Celebrex [Celecoxib] Rash        Social History   Living situation:[AM1.1] Has resided at [facility] for past 2 years[AM1.4]  Family system:[AM1.1] Grandchildren are supportive[AM1.2]. (She had 4 sons, three are  and the fourth has been estranged for many years)[AM1.4]    Functional status[AM1.1]: dependent for all[AM1.2] ADLs  History of substance use/abuse:[AM1.1]  reports that she has never smoked. She does not have any smokeless tobacco history on file.  reports that she does not drink alcohol.[AM1.8]  Judaism affiliation:[AM1.1] Manda[place][AM1.8]    Family History[AM1.1]   I have reviewed this patient's family history and updated it with pertinent information if needed.[AM1.2]   No family history on file.[AM1.8]    Review of Systems[AM1.1]   The 10 point Review of Systems is negative other than noted in the HPI or here.[AM1.2]     Palliative Symptom Review (0=no symptom/no concern, 1=mild, 2=moderate, 3=severe):      Pain:[AM1.1] 2-moderate[AM1.2]      Fatigue:[AM1.1] 2-moderate[AM1.2]      Nausea:[AM1.1] 0-none[AM1.2]      Constipation:[AM1.1] 0-none[AM1.2]      Diarrhea:[AM1.1] 0-none[AM1.2]      Depressive Symptoms:[AM1.1] 3-severe[AM1.2]      Anxiety:[AM1.1] 2-moderate[AM1.2]      Drowsiness:[AM1.1] 0-none[AM1.2]      Poor Appetite:[AM1.1] 0-none[AM1.2]      Shortness of Breath:[AM1.1] 0-none[AM1.2]      Insomnia:[AM1.1] 0-none[AM1.2]       Physical Exam   Temp:  [95.9  F (35.5  C)-97.9  F (36.6  C)] 95.9  F (35.5  C)  Heart Rate:  [] 108  Resp:  [16-18] 18  BP: (112-157)/(66-99) 136/92  SpO2:  [92 %-97 %] 97 %  143 lbs 11.2 oz  GEN:  Alert,[AM1.1] tearful and[AM1.2] oriented[AM1.1] to self only[AM1.2], appears[AM1.1] un[AM1.2]comfortable[AM1.1], but indicates she is distressed from loneliness[AM1.2].  HEENT:  Normocephalic/atraumatic, no scleral icterus, no nasal discharge, mouth moist.  CV:  RRR, S1, S2; no murmurs or other irregularities noted.  +3 DP/PT pulses[AM1.1] bilaterally[AM1.2];[AM1.1] right  had is swollen.[AM1.2]   RESP:  Clear to auscultation bilaterally without rales/rhonchi/wheezing/retractions.  Symmetric chest rise on inhalation noted.  Normal respiratory effort.  ABD:  Rounded, soft, non-tender/non-distended.  +BS  EXT:  Edema & pulses as noted above.  CMS intact x 4.     M/S:[AM1.1]   Right hand and arm are t[AM1.2]sofiya to palpation[AM1.1].[AM1.2]    SKIN:[AM1.1]  Warm and d[AM1.2]ry to touch, no exanthems noted in the visualized areas.    NEURO:[AM1.1] Unwilling or unable to follow commands, but moves all 4 extremities[AM1.2]  Psych:[AM1.1]  Tearful, anxious, distracted, and confused.[AM1.2]        Data   Results for orders placed or performed during the hospital encounter of 12/13/17   CT Head w/o Contrast    Narrative    CT HEAD WITHOUT CONTRAST  12/13/2017 7:08 PM    HISTORY: Fall, altered mental status.      TECHNIQUE: Scans were obtained through the head without IV contrast.  Radiation dose for this scan was reduced using automated exposure  control, adjustment of the mA and/or kV according to patient size, or  iterative reconstruction technique.    COMPARISON: 2/19/2017     FINDINGS: Moderate atrophy. 1 cm area of old infarction right frontal  periventricular region extending into the right basal ganglia.  Moderate chronic white matter disease likely small vessel ischemic  change. No hemorrhage, mass lesion, or focal area of acute infarction  identified. Paranasal sinuses are normal. No bony abnormality. No  change since prior head CT.      Impression    IMPRESSION:   1. Atrophy and chronic white matter disease.  2. 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  3. No interval change.    KIM DALE MD   Shoulder XR, G/E 3 views, right    Narrative    SHOULDER RIGHT THREE OR MORE VIEWS   12/13/2017 7:30 PM     HISTORY: Arm pain.     COMPARISON: None.    FINDINGS: Advanced chronic degenerative changes right shoulder.  Associated hypertrophic changes and cystic degenerative  changes within  the subchondral region of the humeral head. No fracture or acute  appearing abnormality. Degenerative changes right AC joint with slight  widening of the AC joint probably chronic.      Impression    IMPRESSION: Advanced degenerative changes right shoulder and mild  degenerative changes right AC joint. Nothing acute.    KIM DALE MD   Elbow XR, G/E 3 views, right    Narrative    ELBOW RIGHT THREE OR MORE VIEWS   12/13/2017 7:30 PM     HISTORY: Fall, pain.     COMPARISON: None.    FINDINGS: Slight avulsion of the superior aspect of the radial head on  the lateral view is suspected. Exam otherwise negative.      Impression    IMPRESSION: Probable tiny avulsion of the radial head.    KIM DALE MD   Wrist XR, G/E 3 views, right    Narrative    WRIST RIGHT THREE OR MORE VIEWS   12/13/2017 7:30 PM     HISTORY: Fall, pain.     COMPARISON: None.    FINDINGS: Chondrocalcinosis right wrist. Advanced degenerative changes  of the carpometacarpal articulation of the radial aspect of the right  wrist. Nothing acute.      Impression    IMPRESSION: Degenerative changes right wrist. Nothing acute. No  fracture identified.    KIM DALE MD   XR Chest 1 View    Narrative    CHEST ONE VIEW   12/13/2017 7:30 PM     HISTORY: Weakness.     COMPARISON: None.    FINDINGS: Degenerative changes right shoulder and prior left shoulder  arthroplasty. Heart and lungs negative.      Impression    IMPRESSION: Nothing acute. No infiltrates.    KIM DALE MD   Thoracic spine XR, 3 views    Narrative    THORACIC SPINE THREE VIEW   12/13/2017 7:30 PM     HISTORY: Fall, pain.     COMPARISON: None.    FINDINGS: Minimal hypertrophic changes thoracic spine. Generalized  mild narrowing of all of the thoracic disc spaces on a degenerative  basis. No fracture or acute appearing abnormality.      Impression    IMPRESSION: Degenerative changes throughout the thoracic spine but  nothing clearly acute.    KIM DALE  MD   Lumbar spine XR, 2-3 views    Narrative    LUMBAR SPINE TWO - THREE VIEWS   12/13/2017 7:30 PM     HISTORY: Fall, pain;.     COMPARISON: None.    FINDINGS: Generalized narrowing of all lumbar interspaces with vacuum  sign of degenerative disc disease at each level. Grade I  spondylolisthesis of L4 on L5 probably related to facet joint disease  in the mid and lower lumbar spine. No fracture or acute abnormality.  Surgical clips right upper quadrant. Mild lumbar curve concave to the  left.      Impression    IMPRESSION: Advanced generalized degenerative disease of the lumbar  spine without acute appearing abnormality. No fracture.    KIM DALE MD   CT Hand Right w/o Contrast    Narrative    CT RIGHT HAND WITHOUT CONTRAST  12/16/2017 9:22 AM     HISTORY:  Pain in anatomic snuffbox, right thumb base and at thenar  MCP as well as intense pain and swelling at third MCP. Status post  fall.      TECHNIQUE: Axial images with reconstructions. Radiation dose for this  scan was reduced using automated exposure control, adjustment of the  mA and/or kV according to patient size, or iterative reconstruction  technique.     FINDINGS: Suboptimal exam due to motion. Chondrocalcinosis, consistent  with calcium pyrophosphate deposition disease.  Prominent degenerative  arthrosis at the thumb CMC joint. Some degenerative arthrosis at the  thumb MCP and IP joints. Distal to the CMC joints is not well  evaluated due to motion. There is a subchondral cystic lesion or  intraosseous ganglion within the proximal scaphoid. No definite  scaphoid fracture is appreciated. If pain persists in this area, I  would recommend short-term radiographic followup or MRI.      Impression    IMPRESSION:  1. Exam compromised by motion.  2. No obvious scaphoid fracture is appreciated.  3. Additional findings discussed above.     STEFANIE SIM MD   UA reflex to Microscopic and Culture   Result Value Ref Range    Color Urine [place]     Appearance Urine  Cloudy     Glucose Urine Negative NEG^Negative mg/dL    Bilirubin Urine Negative NEG^Negative    Ketones Urine 20 (A) NEG^Negative mg/dL    Specific Gravity Urine 1.020 1.003 - 1.035    Blood Urine Negative NEG^Negative    pH Urine 5.0 5.0 - 7.0 pH    Protein Albumin Urine 100 (A) NEG^Negative mg/dL    Urobilinogen mg/dL 4.0 (H) 0.0 - 2.0 mg/dL    Nitrite Urine Negative NEG^Negative    Leukocyte Esterase Urine Small (A) NEG^Negative    Source Catheterized Urine     RBC Urine 1 0 - 2 /HPF    WBC Urine 104 (H) 0 - 2 /HPF    Mucous Urine Present (A) NEG^Negative /LPF   Basic metabolic panel   Result Value Ref Range    Sodium 134 133 - 144 mmol/L    Potassium 4.1 3.4 - 5.3 mmol/L    Chloride 102 94 - 109 mmol/L    Carbon Dioxide 24 20 - 32 mmol/L    Anion Gap 8 3 - 14 mmol/L    Glucose 140 (H) 70 - 99 mg/dL    Urea Nitrogen 18 7 - 30 mg/dL    Creatinine 0.69 0.52 - 1.04 mg/dL    GFR Estimate 80 >60 mL/min/1.7m2    GFR Estimate If Black >90 >60 mL/min/1.7m2    Calcium 9.1 8.5 - 10.1 mg/dL   CBC with platelets differential   Result Value Ref Range    WBC 11.6 (H) 4.0 - 11.0 10e9/L    RBC Count 4.10 3.8 - 5.2 10e12/L    Hemoglobin 13.7 11.7 - 15.7 g/dL    Hematocrit 40.3 35.0 - 47.0 %    MCV 98 78 - 100 fl    MCH 33.4 (H) 26.5 - 33.0 pg    MCHC 34.0 31.5 - 36.5 g/dL    RDW 12.1 10.0 - 15.0 %    Platelet Count 292 150 - 450 10e9/L    Diff Method Automated Method     % Neutrophils 64.6 %    % Lymphocytes 22.3 %    % Monocytes 12.5 %    % Eosinophils 0.1 %    % Basophils 0.2 %    % Immature Granulocytes 0.3 %    Nucleated RBCs 0 0 /100    Absolute Neutrophil 7.5 1.6 - 8.3 10e9/L    Absolute Lymphocytes 2.6 0.8 - 5.3 10e9/L    Absolute Monocytes 1.5 (H) 0.0 - 1.3 10e9/L    Absolute Eosinophils 0.0 0.0 - 0.7 10e9/L    Absolute Basophils 0.0 0.0 - 0.2 10e9/L    Abs Immature Granulocytes 0.0 0 - 0.4 10e9/L    Absolute Nucleated RBC 0.0    INR   Result Value Ref Range    INR 3.20 (H) 0.86 - 1.14   Lactic acid whole blood   Result  Value Ref Range    Lactic Acid 0.6 (L) 0.7 - 2.0 mmol/L   Troponin I   Result Value Ref Range    Troponin I ES <0.015 0.000 - 0.045 ug/L   Basic metabolic panel   Result Value Ref Range    Sodium 138 133 - 144 mmol/L    Potassium 3.6 3.4 - 5.3 mmol/L    Chloride 105 94 - 109 mmol/L    Carbon Dioxide 26 20 - 32 mmol/L    Anion Gap 7 3 - 14 mmol/L    Glucose 98 70 - 99 mg/dL    Urea Nitrogen 16 7 - 30 mg/dL    Creatinine 0.68 0.52 - 1.04 mg/dL    GFR Estimate 82 >60 mL/min/1.7m2    GFR Estimate If Black >90 >60 mL/min/1.7m2    Calcium 8.5 8.5 - 10.1 mg/dL   CBC with platelets differential   Result Value Ref Range    WBC 8.8 4.0 - 11.0 10e9/L    RBC Count 3.56 (L) 3.8 - 5.2 10e12/L    Hemoglobin 11.9 11.7 - 15.7 g/dL    Hematocrit 35.6 35.0 - 47.0 %     78 - 100 fl    MCH 33.4 (H) 26.5 - 33.0 pg    MCHC 33.4 31.5 - 36.5 g/dL    RDW 12.0 10.0 - 15.0 %    Platelet Count 253 150 - 450 10e9/L    Diff Method Automated Method     % Neutrophils 60.7 %    % Lymphocytes 23.9 %    % Monocytes 13.9 %    % Eosinophils 1.0 %    % Basophils 0.2 %    % Immature Granulocytes 0.3 %    Nucleated RBCs 0 0 /100    Absolute Neutrophil 5.3 1.6 - 8.3 10e9/L    Absolute Lymphocytes 2.1 0.8 - 5.3 10e9/L    Absolute Monocytes 1.2 0.0 - 1.3 10e9/L    Absolute Eosinophils 0.1 0.0 - 0.7 10e9/L    Absolute Basophils 0.0 0.0 - 0.2 10e9/L    Abs Immature Granulocytes 0.0 0 - 0.4 10e9/L    Absolute Nucleated RBC 0.0    INR   Result Value Ref Range    INR 3.57 (H) 0.86 - 1.14   Hepatic panel   Result Value Ref Range    Bilirubin Direct 0.2 0.0 - 0.2 mg/dL    Bilirubin Total 1.0 0.2 - 1.3 mg/dL    Albumin 2.4 (L) 3.4 - 5.0 g/dL    Protein Total 6.7 (L) 6.8 - 8.8 g/dL    Alkaline Phosphatase 61 40 - 150 U/L    ALT 12 0 - 50 U/L    AST 17 0 - 45 U/L   CRP inflammation   Result Value Ref Range    CRP Inflammation 180.0 (H) 0.0 - 8.0 mg/L   Procalcitonin   Result Value Ref Range    Procalcitonin 0.15 ng/ml   Erythrocyte sedimentation rate auto    Result Value Ref Range    Sed Rate 73 (H) 0 - 30 mm/h   INR   Result Value Ref Range    INR 2.77 (H) 0.86 - 1.14   Basic metabolic panel   Result Value Ref Range    Sodium 135 133 - 144 mmol/L    Potassium 3.9 3.4 - 5.3 mmol/L    Chloride 105 94 - 109 mmol/L    Carbon Dioxide 22 20 - 32 mmol/L    Anion Gap 8 3 - 14 mmol/L    Glucose 114 (H) 70 - 99 mg/dL    Urea Nitrogen 17 7 - 30 mg/dL    Creatinine 0.55 0.52 - 1.04 mg/dL    GFR Estimate >90 >60 mL/min/1.7m2    GFR Estimate If Black >90 >60 mL/min/1.7m2    Calcium 8.7 8.5 - 10.1 mg/dL   INR   Result Value Ref Range    INR 4.25 (H) 0.86 - 1.14   Lactic acid level STAT   Result Value Ref Range    Lactic Acid 1.5 0.7 - 2.0 mmol/L   INR   Result Value Ref Range    INR 1.28 (H) 0.86 - 1.14   Lactic acid level STAT   Result Value Ref Range    Lactic Acid 1.0 0.7 - 2.0 mmol/L   CRP inflammation   Result Value Ref Range    CRP Inflammation 30.2 (H) 0.0 - 8.0 mg/L   CBC with platelets   Result Value Ref Range    WBC 9.5 4.0 - 11.0 10e9/L    RBC Count 3.78 (L) 3.8 - 5.2 10e12/L    Hemoglobin 12.4 11.7 - 15.7 g/dL    Hematocrit 37.2 35.0 - 47.0 %    MCV 98 78 - 100 fl    MCH 32.8 26.5 - 33.0 pg    MCHC 33.3 31.5 - 36.5 g/dL    RDW 12.3 10.0 - 15.0 %    Platelet Count 369 150 - 450 10e9/L   Basic metabolic panel   Result Value Ref Range    Sodium 136 133 - 144 mmol/L    Potassium 3.6 3.4 - 5.3 mmol/L    Chloride 102 94 - 109 mmol/L    Carbon Dioxide 27 20 - 32 mmol/L    Anion Gap 7 3 - 14 mmol/L    Glucose 101 (H) 70 - 99 mg/dL    Urea Nitrogen 12 7 - 30 mg/dL    Creatinine 0.65 0.52 - 1.04 mg/dL    GFR Estimate 87 >60 mL/min/1.7m2    GFR Estimate If Black >90 >60 mL/min/1.7m2    Calcium 8.6 8.5 - 10.1 mg/dL   Erythrocyte sedimentation rate auto   Result Value Ref Range    Sed Rate 95 (H) 0 - 30 mm/h   CK total   Result Value Ref Range    CK Total 103 30 - 225 U/L   EKG 12 lead   Result Value Ref Range    Interpretation ECG Click View Image link to view waveform and result     Social Work IP Consult    Narrative    Sweta Izquierdo CM     12/19/2017 11:13 AM  Physical Therapy recommends TCU. FAITH visited patient in room. She   is sleeping. Contacted her granddaughter, Carolina Boothe   ([phone]). She is currently at [facility] but wants to be   kept up to date on situation. She cares for her grandmother. She   would like referrals sent to [facility] and [facility] for a shared room.   She will be back in [place] on Thursday. Sent referrals.     FAITH will continue to follow patient until discharge for any   additional needs.     Sully Izquierdo RN, BSN, CTS  [facility]  [phone]    Addendum 11:07am - Received call from Aleida at [facility]. They want to   know dc plan before accepting patient. Aleida also wanted to know   more about her skin condition. Patient has pressure injury to   bilateral heels requiring heel boots. Skin is red & blanchable.   Also has healing coccyx injury. Covered in mepilex. Wound base is   blanchable erythema with periwound skin intact. Repeated this   information to Aleida. Estimate patient's improvement fair to good.   FAITH will keep [facility] updated on patient's dc plan.               ds   Urine Culture Aerobic Bacterial   Result Value Ref Range    Specimen Description Catheterized Urine     Special Requests Specimen received in preservative     Culture Micro (A)      >100,000 colonies/mL  Aerococcus urinae  Identification obtained by MALDI-TOF mass spectrometry research use only database. Test   characteristics determined and verified by the [facility] Diagnostic Laboratory   ([facility]) [place], [place].         Susceptibility    Aerococcus urinae - KEITH     PENICILLIN <=0.03 Sensitive ug/mL     VANCOMYCIN 0.25 Sensitive ug/mL     TETRACYCLINE <=0.5 Sensitive ug/mL     CEFOTAXIME <=0.25 Sensitive ug/mL     CEFTRIAXONE <=0.25 Sensitive ug/mL     LEVOFLOXACIN 2.0 Sensitive ug/mL     Trimethoprim/Sulfa <=0.25/4.75 Sensitive ug/mL   Blood culture   Result Value Ref Range     Specimen Description Blood Left Hand     Culture Micro No growth after 5 days    Blood culture   Result Value Ref Range    Specimen Description Blood Left Arm     Special Requests Aerobic and anaerobic bottles received     Culture Micro No growth after 5 days[AM1.1]                   Revision History        User Key Date/Time User Provider Type Action    > AM1.4 12/26/2017 10:34 AM Vandana Hagen APRN CNS Clinical Nurse Specialist Addend     AM1.8 12/21/2017  4:16 PM Vandana Hagen APRN CNS Clinical Nurse Specialist Sign     AM1.7 12/21/2017  4:09 PM Vandana Hagen APRN CNS Clinical Nurse Specialist      AM1.6 12/21/2017  4:08 PM Vandana Hagen APRN CNS Clinical Nurse Specialist      AM1.5 12/21/2017  4:04 PM Vandana Hagen APRN CNS Clinical Nurse Specialist      AM1.2 12/21/2017  3:44 PM Vandana Hagen APRN CNS Clinical Nurse Specialist      AM1.3 12/21/2017  1:37 PM Vandana Hagen APRN CNS Clinical Nurse Specialist      AM1.1 12/19/2017  5:32 PM Vandana Hagen APRN CNS Clinical Nurse Specialist             Consults by Sweta Izquierdo CM at 12/19/2017 10:35 AM     Author:  Sweta Izquierdo CM Service:  Care Coordinator Author Type:      Filed:  12/19/2017 11:13 AM Date of Service:  12/19/2017 10:35 AM Creation Time:  12/19/2017 10:30 AM    Status:  Addendum :  Sweta Izquierdo CM ()     Consult Orders:    1. Social Work IP Consult [[id number]] ordered by Roberto Carlos Waterman MD at 12/13/17 3589                Physical Therapy recommends TCU. FAITH visited patient in room. She is sleeping. Contacted her granddaughter, Carolina Boothe ([phone]). She is currently at [facility] but wants to be kept up to date on situation. She cares for her grandmother. She would like referrals sent to [facility] and [facility] [place] for a shared room. She will be back in [place] on Thursday. Sent referrals.     CM will continue to follow patient until  discharge for any additional needs.     Sully Izquierdo, RN, BSN, CTS  [facility]  [phone][BS1.1]    Addendum 11:07am - Received call from Aleida at [facility]. They want to know dc plan before accepting patient. Aleida also wanted to know more about her skin condition. Patient has pressure injury to bilateral heels requiring heel boots. Skin is red & blanchable. Also has healing coccyx injury. Covered in mepilex. Wound base is blanchable erythema with periwound skin intact. Repeated this information to Aleida. Estimate patient's improvement fair to good. CM will keep [facility] updated on patient's dc plan.               ds[BS1.2]     Revision History        User Key Date/Time User Provider Type Action    > BS1.2 12/19/2017 11:13 AM Sweta Izquierdo CM  Addend     BS1.1 12/19/2017 10:35 AM Sweta Izquierdo CM  Sign            Consults by Maggie Dixon APRN CNS at 12/18/2017  4:50 PM     Author:  Maggie Dixon APRN CNS Service:  Palliative Author Type:  Clinical Nurse Specialist    Filed:  12/18/2017  4:52 PM Date of Service:  12/18/2017  4:50 PM Creation Time:  12/18/2017  4:49 PM    Status:  Signed :  Maggie Dixon APRN CNS (Clinical Nurse Specialist)         [facility]    Palliative Care Consultation   Text Page    Date of Admission:  12/13/2017    Consult noted and appreciated, met briefly with patient and plan to complete formal consult tomorrow. She will let me know if any of her granddaughters can be available by phone.    Maggie MCCABE, ALIE  Pain Management and Palliative Care  [facility]  Pgr: [phone][AK1.1]               Revision History        User Key Date/Time User Provider Type Action    > AK1.1 12/18/2017  4:52 PM Maggie Dixon APRN CNS Clinical Nurse Specialist Sign                     Progress Notes - Physician (Notes from 12/23/17 through 12/26/17)      Progress Notes by Vandana Hagen APRN  "CNS at 12/21/2017  4:16 PM     Author:  Vandana Hagen APRN CNS Service:  Palliative Author Type:  Clinical Nurse Specialist    Filed:  12/26/2017 10:37 AM Date of Service:  12/21/2017  4:16 PM Creation Time:  12/21/2017  4:16 PM    Status:  Addendum :  Vandana Hagen APRN CNS (Clinical Nurse Specialist)         [facility]  Palliative Care Progress Note  Text Page    Met with Chuyita as she was resting in bed. She was in much better spirits then when I saw her last and indicated that her right arm is \"much better\".     I phoned the patient's granddaughter/next-of-kin[AM1.1] Stacey[AM1.2] Tl at [phone]. She was [place] [employer] [place] and plans to visit her grandmother this evening. She explained that her grandmother's quality of life has dwindled during the past year. Six months ago they explored the benefit of hospice, but at that time \"she was too good\". Since August Chuyita \"seems to be in extreme pain and doesn't have much of a quality of life\". She would prefer that she is signed on to hospice and return to [facility] if possible. She does not feel that her grandmother would want to try rehab. Stacey plans to be at the hospital tomorrow morning and she plans to call our office once she has a better feel for her arrival time.       Assessment & Plan   1.  Confusion - patient does not demonstrate medical capacity. Family is assisting in care plan.       2.  Pain - Reasonable to try Voltaren topically for right hand pain      3.  Goals of Care: DNR/DNI - Restorative care. Next-of-kin/granddaughter Stacey Boothe would like to have Chuyita return to [facility] while using the hospice benefit.      Vandana Hagen MS, RN, CNS, APRN, ACHPN, FAACVPR  Pain and Palliative Care  Pager [phone]  Office [phone]       Time Spent on this Encounter   I spent  40 minutes in assessment of the patient and discussion with the patient and family. Another 20 minutes in review of " chart, documentation and discussion with the health care team.    Interval History   Chart review    Palliative Symptom Review (0=no symptom/no concern, 1=mild, 2=moderate, 3=severe):      Pain: 1-mild      Fatigue: 3-severe      Nausea: 0-none      Constipation: 0-none      Diarrhea: 0-none      Depressive Symptoms: 1-mild      Anxiety: 1-mild      Drowsiness: 0-none      Poor Appetite: 0-none      Shortness of Breath: 0-none      Insomnia: 0-none        Physical Exam   Temp:  [96  F (35.6  C)-98.2  F (36.8  C)] 96  F (35.6  C)  Heart Rate:  [] 137  Resp:  [12-20] 18  BP: (100-170)/() 147/83  SpO2:  [90 %-96 %] 96 %  143 lbs 11.2 oz  GEN:  Alert, oriented to self, appears comfortable.  HEENT:  Normocephalic/atraumatic, no scleral icterus, no nasal discharge, mouth moist.  RESP:  Symmetric chest rise on inhalation noted.  Normal respiratory effort.  PAIN BEHAVIOR: Cooperative  Psych:  Flat affect, calm, cooperative, conversant but confused.    Medications     diltiazem (CARDIZEM) infusion ADULT 5 mg/hr (12/21/17 1116)     - MEDICATION INSTRUCTIONS -         diltiazem  30 mg Oral Q6H JORGE     multivitamin, therapeutic with minerals  1 tablet Oral Daily     diclofenac  2 g Transdermal 4x Daily     predniSONE  15 mg Oral Daily     fluticasone-vilanterol  1 puff Inhalation Daily     gabapentin (NEURONTIN) tablet 600 mg  600 mg Oral At Bedtime     latanoprost  1 drop Both Eyes At Bedtime     omeprazole (priLOSEC) CR capsule 20 mg  20 mg Oral QAM AC     senna-docusate  1 tablet Oral Daily       Data[AM1.1]   Results for orders placed or performed during the hospital encounter of 12/13/17 (from the past 24 hour(s))   Magnesium   Result Value Ref Range    Magnesium 2.2 1.6 - 2.3 mg/dL   Potassium   Result Value Ref Range    Potassium 5.0 3.4 - 5.3 mmol/L   Basic metabolic panel   Result Value Ref Range    Sodium 137 133 - 144 mmol/L    Potassium 3.6 3.4 - 5.3 mmol/L    Chloride 103 94 - 109 mmol/L    Carbon  Dioxide 27 20 - 32 mmol/L    Anion Gap 7 3 - 14 mmol/L    Glucose 93 70 - 99 mg/dL    Urea Nitrogen 12 7 - 30 mg/dL    Creatinine 0.63 0.52 - 1.04 mg/dL    GFR Estimate >90 >60 mL/min/1.7m2    GFR Estimate If Black >90 >60 mL/min/1.7m2    Calcium 8.5 8.5 - 10.1 mg/dL   CBC with platelets   Result Value Ref Range    WBC 8.2 4.0 - 11.0 10e9/L    RBC Count 3.44 (L) 3.8 - 5.2 10e12/L    Hemoglobin 11.2 (L) 11.7 - 15.7 g/dL    Hematocrit 34.4 (L) 35.0 - 47.0 %     78 - 100 fl    MCH 32.6 26.5 - 33.0 pg    MCHC 32.6 31.5 - 36.5 g/dL    RDW 12.4 10.0 - 15.0 %    Platelet Count 362 150 - 450 10e9/L   Echocardiogram Complete    Narrative    [id number]  [facility]  [id number]  [id number]^JANELL^LALITHA^[facility]  Echocardiography Laboratory  [address] [place] [address]        Name: EDISON WATTS  MRN: [id number]  : 1932  Study Date: 2017 09:15 AM  Age: 85 yrs  Gender: Female  Patient Location: [facility]  Reason For Study: Afib  Ordering Physician: LALITHA MACIEL  Referring Physician: Sen Flores  Performed By: Carmen Izquierdo RDCS     BSA: 1.7 m2  Height: 64 in  Weight: 143 lb  HR: 104  BP: 133/88 mmHg  _____________________________________________________________________________  __        Procedure  Complete Portable Echo Adult.  _____________________________________________________________________________  __        Interpretation Summary     Hyperdynamic left ventricular function  The visual ejection fraction is estimated at >70%.  There is moderate (2+) mitral regurgitation.  The rhythm was rapid atrial fibrillation.  The study was technically difficult. There is no comparison study available.  _____________________________________________________________________________  __        Left Ventricle  The left ventricle is normal in size. Hyperdynamic left ventricular function.  The visual ejection fraction is estimated at >70%. Left ventricular  diastolic  function is indeterminate. No regional wall motion abnormalities noted.     Right Ventricle  The right ventricle is normal size. The right ventricular systolic function is  normal.     Atria  Normal left atrial size. Right atrial size is normal. There is no color  Doppler evidence of an atrial shunt.     Mitral Valve  There is mild mitral annular calcification. The mitral valve leaflets are  mildly thickened. There is moderate (2+) mitral regurgitation.        Tricuspid Valve  There is mild to moderate (1-2+) tricuspid regurgitation. The right  ventricular systolic pressure is approximated at 29.0 mmHg plus the right  atrial pressure. Normal IVC (1.5-2.5cm) with >50% respiratory collapse; right  atrial pressure is estimated at 5-10mmHg.     Aortic Valve  There is mild trileaflet aortic sclerosis. There is trace aortic  regurgitation. No aortic stenosis is present.     Pulmonic Valve  The pulmonic valve is not well seen, but is grossly normal.     Vessels  The aortic root is normal size.     Pericardium  There is no pericardial effusion.        Rhythm  The rhythm was rapid atrial fibrillation.  _____________________________________________________________________________  __  MMode/2D Measurements & Calculations  IVC diam: 2.0 cm     Ao root diam: 2.9 cm  LA dimension: 3.6 cm  asc Aorta Diam: 3.2 cm  LA/Ao: 1.2  LA Volume (BP): 47.0 ml  LA Volume Index (BP): 27.6 ml/m2        Doppler Measurements & Calculations  MV E max rosemarie: 113.0 cm/sec  MV dec time: 0.12 sec  MR ERO: 0.20 cm2  MR volume: 28.9 ml     TR max rosemarie: 268.7 cm/sec  TR max P.0 mmHg  E/E' av.0  Lateral E/e': 11.5  Medial E/e': 12.6           _____________________________________________________________________________  __           Report approved by: Micaela Gong 2017 02:40 PM[AM1.3]             Revision History        User Key Date/Time User Provider Type Action    > AM1.2 2017 10:37 AM Vandana Hagen APRN  "CNS Clinical Nurse Specialist Addend     AM1.3 12/21/2017  5:29 PM Vandana Hagen APRN CNS Clinical Nurse Specialist Sign     AM1.1 12/21/2017  4:16 PM Vandana Hagen APRN CNS Clinical Nurse Specialist             Progress Notes by Vandana Hagen APRN CNS at 12/22/2017  1:10 PM     Author:  Vandana Hagen APRN CNS Service:  Palliative Author Type:  Clinical Nurse Specialist    Filed:  12/26/2017 10:36 AM Date of Service:  12/22/2017  1:10 PM Creation Time:  12/22/2017  1:10 PM    Status:  Addendum :  Vandana Hagen APRN CNS (Clinical Nurse Specialist)         [facility]  Palliative Care Progress Note  Text Page    Met with the patients grand-daughters/next-of-kin Stacey Boothe and Taylor Carty in the third floor conference room. They had met with Chuyita last evening[AM1.1] and she had made it clear that she did not want aggressive intervention and would want to return to [facility] with the support of hospice. They are quite aware of the hospice benefit as the father used the benefit for one day when he passed from \"alcoholism\" and they cared for their uncle (the patient's son) when he was on hospice for cancer. They had also research hospice with [facility] and [facility] and found that Chuyita was not quite ready for hospice at that time. Chuyita's PCP (Dr. Flores) had recommended that she start hospice due to her worsening pain. Stacey and  state that Chuyita would not want to continue with therapy, and lab draws. We discussed their grandmother's recent problems with Atrial Fibrillation and they request a comfort plan. Appreciate assistance of  STACI Singh regarding the family's request to return to [facility] on hospice care. The patient will not be able to return to that facility until Tuesday due to the Gail holiday on Monday.[AM1.2]            Assessment & Plan       1.  Dementia - patient does not demonstrate medical " capacity. Family is assisting in care plan.        2.  Pain - Reasonable try Amitriptyline (Elavil) at bedtime for comfort        3.  Goals of Care: DNR/DNI - Comfort care. Next-of-kin/granddaughters[AM1.1] Stacey[AM1.3] Litoscooby and  Carloz request that the patient return to [facility] for hospice care.       Vandana Hagen MS, RN, CNS, APRN, ACHPN, FAACVPR  Pain and Palliative Care  Pager [phone]  Office [phone]     Time Spent on this Encounter   I spent from 10:40 AM until 11:50 AM in assessment of the patient and discussion with the patient and family. Another 20 minutes in review of chart, documentation and discussion with the health care team.    Interval History   Chart reviewed    Palliative Symptom Review (0=no symptom/no concern, 1=mild, 2=moderate, 3=severe):      Pain: 2-moderate      Fatigue: 2-moderate      Nausea: 0-none      Constipation: 0-none      Diarrhea: 0-none      Depressive Symptoms: 2-moderate      Anxiety: 1-mild      Drowsiness: 0-none      Poor Appetite: 0-none      Shortness of Breath: 0-none      Insomnia: 0-none        Physical Exam   Temp:  [96  F (35.6  C)-98.4  F (36.9  C)] 98.4  F (36.9  C)  Heart Rate:  [] 116  Resp:  [12-24] 12  BP: (107-147)/(58-89) 127/70  SpO2:  [91 %-96 %] 94 %  143 lbs 11.2 oz  GEN:  Alert, oriented to self and situation, appears comfortable, NAD.  HEENT:  Normocephalic/atraumatic, no scleral icterus, no nasal discharge, mouth moist.  RESP:  Symmetric chest rise on inhalation noted.  Normal respiratory effort.  PAIN BEHAVIOR: Cooperative  Psych:  Normal affect.  Calm, cooperative, conversant appropriately.    Medications     diltiazem (CARDIZEM) infusion ADULT 5 mg/hr (12/22/17 0853)     - MEDICATION INSTRUCTIONS -         diltiazem  90 mg Oral Q6H JORGE     amitriptyline  50 mg Oral At Bedtime     multivitamin, therapeutic with minerals  1 tablet Oral Daily     diclofenac  2 g Transdermal 4x Daily     predniSONE  15 mg Oral Daily      fluticasone-vilanterol  1 puff Inhalation Daily     gabapentin (NEURONTIN) tablet 600 mg  600 mg Oral At Bedtime     latanoprost  1 drop Both Eyes At Bedtime     omeprazole (priLOSEC) CR capsule 20 mg  20 mg Oral QAM AC     senna-docusate  1 tablet Oral Daily       Data[AM1.1]   Results for orders placed or performed during the hospital encounter of 12/13/17 (from the past 24 hour(s))   Lactic acid level STAT   Result Value Ref Range    Lactic Acid 0.9 0.7 - 2.0 mmol/L[AM1.4]          Revision History        User Key Date/Time User Provider Type Action    > AM1.3 12/26/2017 10:36 AM Vandana Hagen APRN CNS Clinical Nurse Specialist Addend     [N/A] 12/22/2017  1:58 PM Vandana Hagen APRN CNS Clinical Nurse Specialist Addend     AM1.2 12/22/2017  1:43 PM Vandana Hagen APRN CNS Clinical Nurse Specialist Sign     AM1.4 12/22/2017  1:16 PM Vandana Hagen APRN CNS Clinical Nurse Specialist      AM1.1 12/22/2017  1:10 PM Vandana Hagen APRN CNS Clinical Nurse Specialist             Progress Notes by Vandana Hagen APRN CNS at 12/26/2017  8:04 AM     Author:  Vandana Hagen APRN CNS Service:  Palliative Author Type:  Clinical Nurse Specialist    Filed:  12/26/2017 10:35 AM Date of Service:  12/26/2017  8:04 AM Creation Time:  12/26/2017  8:04 AM    Status:  Addendum :  Vandana Hagen APRN CNS (Clinical Nurse Specialist)         [facility]  Palliative Care Progress Note  Text Page[AM1.1]    Met with Chuyita as she was resting in bed.[AM1.2] Next-of-kin/granddaughters Stacey Boothe and Taylor Carty[AM1.1] met [facility] Social Worker STACI Reynaga and we moved to the ICU conference room for a discussion regarding dismissal. Her granddaughters have witnessed a change in Chuyita during the weekend as she has been more lethargic and uninterested in eating. She only took a bite of a [place] cookie yesterday, but seemed to enjoy  celebrating Gail with her two great-grandchildren (Stacey's children are age 2 and 4 years). Her granddaughters are concerned about the amount of pain Chuyita has had so we discussed a plan to schedule Roxicodone every 4 hours for comfort which they readily agreed.[AM1.2]           Assessment & Plan       1.  Dementia - patient does not demonstrate medical capacity. Family is assisting in care plan.        2.  Pain - Appreciate nursing efforts to maintain patient's comfort. She has needed 42.8 mg[AM1.1] Roxicodone[AM1.2] in the past day for comfort (equal analgesic to mg or oral morphine)[AM1.1]. Reasonable to use 7.5 mg Roxicodone every 4 hours to keep on top of pain and use 5 mg as needed for comfort.[AM1.2]        3.  Goals of Care: DNR/DNI - Comfort care. Next-of-kin/[AM1.1]POA[AM1.2] Stacey Boothe[AM1.1] has signed paperwork for[AM1.2] [facility][AM1.1] with the plan to dismiss later today[AM1.2].        Vandana Hagen MS, RN, CNS, APRN, ACHPN, FAACVPR  Pain and Palliative Care  Pager [phone]  Office [phone]       Time Spent on this Encounter   I spent[AM1.1]  From 8:45 Am until 9:30 AM[AM1.2] minutes in assessment of the patient and discussion with the patient and family. Another[AM1.1] 20[AM1.2] minutes in review of chart, documentation and discussion with the health care team.    Interval History[AM1.1]   Chart reviewed[AM1.2]     Palliative Symptom Review (0=no symptom/no concern, 1=mild, 2=moderate, 3=severe):      Pain:[AM1.1] 2-moderate[AM1.2]      Fatigue:[AM1.1] 2-moderate[AM1.2]      Nausea:[AM1.1] 0-none[AM1.2]      Constipation:[AM1.1] 0-none[AM1.2]      Diarrhea:[AM1.1] 0-none[AM1.2]      Depressive Symptoms:[AM1.1] 0-none[AM1.2]      Anxiety:[AM1.1] 0-none[AM1.2]      Drowsiness:[AM1.1] 1-mild[AM1.2]      Poor Appetite:[AM1.1] 3-severe[AM1.2]      Shortness of Breath:[AM1.1] 0-none[AM1.2]      Insomnia:[AM1.1] 0-none[AM1.2]        Physical Exam   SpO2:  [96 %-97 %] 96 %  143 lbs  11.2 oz  GEN:  Alert, oriented[AM1.1] to self and situation[AM1.2], appears comfortable.  HEENT:  Normocephalic/atraumatic, no scleral icterus, no nasal discharge, mouth moist.  CV:[AM1.1] Irregular at 88[AM1.2], S1, S2; no murmurs or other irregularities noted.  +3 DP/PT pulses[AM1.1] bilaterally[AM1.2]; no edema BLE.  RESP:  Clear to auscultation bilaterally without rales/rhonchi/wheezing/retractions.  Symmetric chest rise on inhalation noted.  Normal respiratory effort.  ABD:  Rounded, soft, non-tender/non-distended.  +BS  EXT:  CMS intact x 4.       SKIN:[AM1.1]  Warm and d[AM1.2]ry to touch, no exanthems noted in the visualized areas.    PAIN BEHAVIOR: Cooperative  Psych:[AM1.1]  F[AM1.2]l[AM1.1]at[AM1.2] affect[AM1.1], c[AM1.2]arnold, cooperative,[AM1.1] and[AM1.2] conversant.    Medications     - MEDICATION INSTRUCTIONS -         amitriptyline  50 mg Oral At Bedtime     atenolol  50 mg Oral BID     multivitamin, therapeutic with minerals  1 tablet Oral Daily     diclofenac  2 g Transdermal 4x Daily     predniSONE  15 mg Oral Daily     fluticasone-vilanterol  1 puff Inhalation Daily     gabapentin (NEURONTIN) tablet 600 mg  600 mg Oral At Bedtime     latanoprost  1 drop Both Eyes At Bedtime     omeprazole (priLOSEC) CR capsule 20 mg  20 mg Oral QAM AC     senna-docusate  1 tablet Oral Daily       Data   No results found for this or any previous visit (from the past 24 hour(s)).[AM1.1]       Revision History        User Key Date/Time User Provider Type Action    > [N/A] 12/26/2017 10:35 AM Vandana Hagen APRN CNS Clinical Nurse Specialist Addend     AM1.2 12/26/2017 10:13 AM Vandana Hagen APRN CNS Clinical Nurse Specialist Sign     AM1.1 12/26/2017  8:04 AM Vandana Hagen APRN CNS Clinical Nurse Specialist             Progress Notes by Judi Cameron RN at 12/26/2017 10:18 AM     Author:  Judi Cameron RN Service:  Care Coordinator Author Type:      Filed:  12/26/2017  10:19 AM Date of Service:  12/26/2017 10:18 AM Creation Time:  12/26/2017 10:18 AM    Status:  Signed :  Judi Cameron RN ()         Family is aware of cost and private pay for HE transportation via [employer].  Judi Cameron RN BSN   [employer] RN  RN Care Coordinator  [facility]   [phone][SM1.1]         Revision History        User Key Date/Time User Provider Type Action    > SM1.1 12/26/2017 10:19 AM Judi Cameron RN Case Manager Sign            Progress Notes by Judi Cameron RN at 12/26/2017  9:55 AM     Author:  Judi Cameron RN Service:  Care Coordinator Author Type:      Filed:  12/26/2017 10:18 AM Date of Service:  12/26/2017  9:55 AM Creation Time:  12/26/2017  9:55 AM    Status:  Addendum :  Judi Cameron RN ()         Transportation is set up for[SM1.1] [employer][SM1.2] transport by [employer] per family request at 1:30PM today back to the [facility] with Hospice.  Family has been updated.[SM1.1]    DC orders have been faxed to [facility] at [phone] and [facility] [phone] via [employer][SM1.3]      Judi Cameron RN BSN   [employer] RN  RN Care Coordinator  [facility]   [phone][SM1.1]         Revision History        User Key Date/Time User Provider Type Action    > SM1.3 12/26/2017 10:18 AM Judi Cameron RN Case Manager Addend     SM1.2 12/26/2017 10:15 AM Judi Cameron RN Case Manager Addend     SM1.1 12/26/2017  9:57 AM Judi Cameron RN Case Manager Sign            Progress Notes by Dinah Gilman MD at 12/25/2017 10:29 AM     Author:  Dinah Gilman MD Service:  Hospitalist Author Type:  Physician    Filed:  12/25/2017 10:29 AM Date of Service:  12/25/2017 10:29 AM Creation Time:  12/25/2017 10:29 AM    Status:  Signed :  Dinah Gilman MD (Physician)         [facility]  "Hospital  Hospitalist Progress Note  Dinah Gilman MD, MD 12/25/2017    Reason for Stay (Diagnosis): Fall, possibly associated with UTI         Assessment and Plan:      Summary of Stay: Chuyita Gayle is a 85 year old female w/ hx of chronic pain syndrome, a-fib on coumadin, anxiety, fibromyalgia, HTN, asthma who presented on 12/13/2017 following a fall.  She reportedly has been requiring more and more assistance at her [facility] even with transfers to the point where per her granddaughter she essentially was requiring SNF type care already and was needing heavy assist of two or a lift (which she often refused to use and was basically just bed-bound/chair bound).  She reportedly fell during a transfer and she complained of right wrist and elbow pain and in the ED was found to have evidence of UTI with early Sepsis.  I note that in the ED, the patient was found to have a mildly elevated WBC, but no left shift. In addition, she had LGT and UA is suggestive of infection, though LE is \"small\". Ketones were positive, possibly related to decreased oral intake.  She was started on IV ceftriaxone and admitted for further care.  Following admission Chuyita was noted to have fairly profound diffuse pain out of proportion to what may have been expected based on her fall.  Based on markedly elevated inflammatory markers and fairly diffuse MSK pain with report of prior hx of  \"inflammatory arthritis\" she was empirically started on steroids for suspected PMR (initially solumedrol 1 mg/kg once IV, then Prednisone 20 mg po daily w/ taper).  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  She has been declining during her hospital course. The overall goal for her is comfort/minimizing suffering.  On 12/20 patient developed A. fib RVR and was started on diltiazem drip.  This was discontinued after a year status post change it to comfort care. The plan is to discharge her to " "hospice.   following.       Problem List:   1. Severe diffuse pain.  Patient has been having multiple complaints in regards to her pain.  --continue prednisone 15 mg daily for now, slowly taper off over the next 8 weeks given marked improvement in pain complaints on this med.  --Patient's status was changed to comfort care/hospice on 12/22 after after discussion with his family.    2. Right distal radius avulsion fracture. CT scan of the right hand completed 12/16 shows no new or additional fractures.     3. Right shoulder pain, present from well before the hospitalization.     4. Possible UTI, no specific signs of sepsis in my opinion. Completed Ceftriaxone. (Aerococcus growing on the first day), pan-sensitive (including ceftriaxone).     5. Supratherapeutic INR on admission.  Coumadin was discontinued during this admission.  Risks of warfarin likely exceed benefits in the short term and after discussion w/ family they've decided to stop this indefinitely.    6. Atrial fibrillation for which she was chronically anticoagulated, recent issues w/ a-fib w/ RVR. With her tendency to fall planned to discontinue Warfarin. Now comfort care so per discussion w/ family will stop dilt drip and further monitoring/workup with the understanding that she could decompensate significantly.  --continue baseline atenolol 50 mg but hold off on more aggressive measures.    7.  Severe malnutrition    8.  Severe chronic pain syndrome    DVT Prophylaxis: none indicated for comfort.  Code Status: DNR/DNI and comfort cares.  Discharge Dispo: back to [facility] w/ Hospice.  Likely discharge on 12/26 due to holidays.  SW following for discharge planning.        Interval History (Subjective):      Patient seen and examined.  She is sleepy.  No new issues other than pain complaints.  No fever.                Physical Exam:      Last Vital Signs:  /70  Pulse 75  Temp 98.4  F (36.9  C) (Oral)  Resp 12  Ht 1.626 m (5' 4\")  " Wt 65.2 kg (143 lb 11.2 oz)  SpO2 97%  BMI 24.67 kg/m2    I/O last 3 completed shifts:  In: -   Out: 3169 [Urine:3169]    Constitutional: Awake, alert, comfortable. Ongoing tenderness over the right hand and wrist, but otherwise minimal facial or other muscular tenderness.   Respiratory: Clear to auscultation bilaterally, no crackles or wheezing   cardiac Tachycardic, irregular.   Abdomen: Normal bowel sounds, soft, non-distended, non-tender   Skin: No rashes, no cyanosis, dry to touch   Neuro: Alert and appears coherent, oriented to situation.    Extremities: Right hand is quite edematous with erythema over the third MCP. Bilat LE edema.   Other(s):        All other systems: Negative          Medications:      All current medications were reviewed with changes reflected in problem list.         Data:      All new lab and imaging data was reviewed.   Labs/Imaging:  No results found for this or any previous visit (from the past 24 hour(s)).[CJ1.1]       Revision History        User Key Date/Time User Provider Type Action    > CJ1.1 12/25/2017 10:29 AM Dinah Gilman MD Physician Sign            Progress Notes by Dinah Gilman MD at 12/24/2017 10:54 AM     Author:  Dinah Gilman MD Service:  Hospitalist Author Type:  Physician    Filed:  12/24/2017 10:59 AM Date of Service:  12/24/2017 10:54 AM Creation Time:  12/24/2017 10:54 AM    Status:  Signed :  Dinah Gilman MD (Physician)         [facility]  Hospitalist Progress Note  Dinah Gilman MD, MD 12/24/2017    Reason for Stay (Diagnosis): Fall, possibly associated with UTI         Assessment and Plan:      Summary of Stay: Chuyita Gayle is a 85 year old female w/ hx of chronic pain syndrome, a-fib on coumadin, anxiety, fibromyalgia, HTN, asthma who presented on 12/13/2017 following a fall.  She reportedly has been requiring more and more assistance at her K[facility] even with transfers to the point  "where per her granddaughter she essentially was requiring SNF type care already and was needing heavy assist of two or a lift (which she often refused to use and was basically just bed-bound/chair bound).  She reportedly fell during a transfer and she complained of right wrist and elbow pain and in the ED was found to have evidence of UTI with early Sepsis.  I note that in the ED, the patient was found to have a mildly elevated WBC, but no left shift. In addition, she had LGT and UA is suggestive of infection, though LE is \"small\". Ketones were positive, possibly related to decreased oral intake.  She was started on IV ceftriaxone and admitted for further care.  Following admission Chuyita was noted to have fairly profound diffuse pain out of proportion to what may have been expected based on her fall.  Based on markedly elevated inflammatory markers and fairly diffuse MSK pain with report of prior hx of  \"inflammatory arthritis\" she was empirically started on steroids for suspected PMR (initially solumedrol 1 mg/kg once IV, then Prednisone 20 mg po daily w/ taper).  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  She has been declining during her hospital course. The overall goal for her is comfort/minimizing suffering.  On 12/20 patient developed A. fib RVR and was started on diltiazem drip.  This was discontinued after a year status post change it to comfort care. The plan is to discharge her to hospice.   following.       Problem List:   1. Severe diffuse pain.  Patient has been having multiple complaints in regards to her pain.  --continue prednisone 15 mg daily for now, slowly taper off over the next 8 weeks given marked improvement in pain complaints on this med.  --Patient's status was changed to comfort care/hospice on 12/22 after after discussion with his family.    2. Right distal radius avulsion fracture. CT scan of the right hand completed " "12/16 shows no new or additional fractures.     3. Right shoulder pain, present from well before the hospitalization.     4. Possible UTI, no specific signs of sepsis in my opinion. Completed Ceftriaxone. (Aerococcus growing on the first day), pan-sensitive (including ceftriaxone).     5. Supratherapeutic INR on admission.  Coumadin was discontinued during this admission.  Risks of warfarin likely exceed benefits in the short term and after discussion w/ family they've decided to stop this indefinitely.    6. Atrial fibrillation for which she was chronically anticoagulated, recent issues w/ a-fib w/ RVR. With her tendency to fall planned to discontinue Warfarin. Now comfort care so per discussion w/ family will stop dilt drip and further monitoring/workup with the understanding that she could decompensate significantly.  --continue baseline atenolol 50 mg but hold off on more aggressive measures.    7.  Severe malnutrition    8.  Severe chronic pain syndrome    DVT Prophylaxis: none indicated for comfort.  Code Status: DNR/DNI and comfort cares.  Discharge Dispo: back to [facility] w/ Hospice.  Likely discharge on 12/26 due to holidays.  SW following for discharge planning.        Interval History (Subjective):      Patient seen and examined.  She is sleepy.  No new issues other than pain complaints.  No fever.                Physical Exam:      Last Vital Signs:  /70  Pulse 75  Temp 98.4  F (36.9  C) (Oral)  Resp 12  Ht 1.626 m (5' 4\")  Wt 65.2 kg (143 lb 11.2 oz)  SpO2 92%  BMI 24.67 kg/m2    I/O last 3 completed shifts:  In: 1310 [P.O.:1310]  Out: 1000 [Urine:1000]    Constitutional: Awake, alert, comfortable. Ongoing tenderness over the right hand and wrist, but otherwise minimal facial or other muscular tenderness.   Respiratory: Clear to auscultation bilaterally, no crackles or wheezing   cardiac Tachycardic, irregular.   Abdomen: Normal bowel sounds, soft, non-distended, non-tender   Skin: No " rashes, no cyanosis, dry to touch   Neuro: Alert and appears coherent, oriented to situation.    Extremities: Right hand is quite edematous with erythema over the third MCP. Bilat LE edema.   Other(s):        All other systems: Negative          Medications:      All current medications were reviewed with changes reflected in problem list.         Data:      All new lab and imaging data was reviewed.   Labs/Imaging:  No results found for this or any previous visit (from the past 24 hour(s)).[CJ1.1]       Revision History        User Key Date/Time User Provider Type Action    > CJ1.1 12/24/2017 10:59 AM Dinah Gilman MD Physician Sign            Progress Notes by Dinah Gilman MD at 12/23/2017 11:32 AM     Author:  Dinah Gilman MD Service:  Hospitalist Author Type:  Physician    Filed:  12/23/2017  2:02 PM Date of Service:  12/23/2017 11:32 AM Creation Time:  12/23/2017 11:32 AM    Status:  Signed :  Dinah Gilman MD (Physician)         [facility]  Hospitalist Progress Note  Dinah Gilman MD, MD 12/23/2017    Reason for Stay (Diagnosis): Fall, possibly associated with UTI         Assessment and Plan:      Summary of Stay: Chuyita Gayle is a 85 year old female w/ hx of chronic pain syndrome, a-fib on coumadin, anxiety, fibromyalgia, HTN, asthma who presented on 12/13/2017 following a fall.  She reportedly has been requiring more and more assistance at her [facility] even with transfers to the point where per her granddaughter she essentially was requiring SNF type care already and was needing heavy assist of two or a lift (which she often refused to use and was basically just bed-bound/chair bound).  She reportedly fell during a transfer and she complained of right wrist and elbow pain and in the ED was found to have evidence of UTI with early Sepsis.  I note that in the ED, the patient was found to have a mildly elevated WBC, but no left shift. In  "addition, she had LGT and UA is suggestive of infection, though LE is \"small\". Ketones were positive, possibly related to decreased oral intake.  She was started on IV ceftriaxone and admitted for further care.  Following admission Chuyita was noted to have fairly profound diffuse pain out of proportion to what may have been expected based on her fall.  Based on markedly elevated inflammatory markers and fairly diffuse MSK pain with report of prior hx of  \"inflammatory arthritis\" she was empirically started on steroids for suspected PMR (initially solumedrol 1 mg/kg once IV, then Prednisone 20 mg po daily w/ taper).  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    She has been very slow to improve here in spite of completing an antibiotic course and initially improved pain.  She has been anxious and has been requiring a lift for transfers.     Earlier in her stay I had a long talk with her granddaughter, , who confirms that Chuyita has been gradually failing for the past couple of years and was to the point of requiring lift transfers the past couple of months.  The overall goal for her is comfort/minimizing suffering.  I discussed her hospital course in detail and appropriate follow up.[CJ1.1]    On 12/20 patient developed A. fib RVR and was started on diltiazem drip.  This was discontinued after a year status post change it to comfort care. The plan is to discharge her to hospice.   following.[CJ1.2]       Problem List:   1. Severe diffuse pain.[CJ1.1]  Patient has multiple complaints in regards to the pain.  She does appear confused today.[CJ1.2]  --continue prednisone 15 mg daily for now, slowly taper off over the next 8 weeks given marked improvement in pain complaints on this med.  --[CJ1.1]Patient's status was changed to comfort care/hospice on 12/22 after after discussion with his family.[CJ1.2]    2. Right distal radius avulsion " "fracture. CT scan of the right hand completed 12/16 shows no new or additional fractures.     3. Right shoulder pain, present from well before the hospitalization.     4. Possible UTI, no specific signs of sepsis in my opinion. Completed Ceftriaxone. (Aerococcus growing on the first day), pan-sensitive (including ceftriaxone).     5. Supratherapeutic INR on admission. Coumadin held due to demonstrated very high fall risk.  Rechecked and now normalized.  Risks of warfarin likely exceed benefits in the short term and after discussion w/ family they've decided to stop this indefinitely.    6. Atrial fibrillation for which she was chronically anticoagulated, recent issues w/ a-fib w/ RVR. With her tendency to fall planned to discontinue Warfarin. Now comfort care so per discussion w/ family will stop dilt drip and further monitoring/workup with the understanding that she could decompensate significantly.  --continue baseline atenolol 50 mg but hold off on more aggressive measures.    7.  Severe malnutrition    8.  Severe chronic pain syndrome    DVT Prophylaxis: none indicated for comfort.  Code Status: DNR/DNI and comfort cares.  Discharge Dispo: back to [facility] w/ Hospice.  May not be able to happen until 12/26 due to holidays.  SW following.        Interval History (Subjective):      Patient seen and examined.  She looks sleepy.  There is no new complaints.  She has no nausea or vomiting.  She denies chest pain, cough, shortness of breath.  My colleague discussed with the family yesterday and her status was changed to comfort care.                Physical Exam:      Last Vital Signs:  /85 (BP Location: Left arm)  Pulse 75  Temp 98.4  F (36.9  C) (Oral)  Resp 12  Ht 1.626 m (5' 4\")  Wt 65.2 kg (143 lb 11.2 oz)  SpO2 92%  BMI 24.67 kg/m2    I/O last 3 completed shifts:  In: 580 [P.O.:580]  Out: 100 [Urine:100]    Constitutional: Awake, alert, comfortable. Ongoing tenderness over the right hand and " wrist, but otherwise minimal facial or other muscular tenderness.   Respiratory: Clear to auscultation bilaterally, no crackles or wheezing   cardiac Tachycardic, irregular.   Abdomen: Normal bowel sounds, soft, non-distended, non-tender   Skin: No rashes, no cyanosis, dry to touch   Neuro: Alert and appears coherent, oriented to situation.    Extremities: Right hand is quite edematous with erythema over the third MCP. Bilat LE edema.   Other(s):        All other systems: Negative          Medications:      All current medications were reviewed with changes reflected in problem list.         Data:      All new lab and imaging data was reviewed.   Labs/Imaging:  No results found for this or any previous visit (from the past 24 hour(s)).[CJ1.1]       Revision History        User Key Date/Time User Provider Type Action    > CJ1.2 12/23/2017  2:02 PM Dinah Gilman MD Physician Sign     CJ1.1 12/23/2017 11:32 AM Dinah Gilman MD Physician                   Procedure Notes     No notes of this type exist for this encounter.      Progress Notes - Therapies (Notes from 12/23/17 through 12/26/17)     No notes of this type exist for this encounter.

## 2017-12-13 NOTE — IP AVS SNAPSHOT
` ` Patient Information     Patient Name Sex Chuyita Sarabia ([id number]) Female 1932       Room Bed    0300 0300-01      Patient Demographics     Address Phone    [address]   [place] [address] [phone] (Home)  NONE (Work)  [phone] (Mobile) *Preferred*      Patient Ethnicity & Race     Ethnic Group Patient Race    American White      Emergency Contact(s)     Name Relation Home Work Mobile    HECTOR [phone]    ADY CYR Grandesme [phone]    BLASSAIRA MARION Grandchild [phone] NONE [phone]      Documents on File        Status Date Received Description       Documents for the Patient    Affiliate Privacy placeholder   phase3    Consent for EHR Access Received 02/10/17     Insurance Card Received 02/10/17     External Medication Information Consent       Patient ID Received 02/10/17     [facility] Specified Other       Consent for Services/Privacy Notice - Hospital/Clinic Received 02/10/17     Privacy Notice - [place] Received 02/10/17     External Medication Information Consent       [facility] Specified Other       Privacy Notice - [place] Received 17     [facility] Authorization  17 [facility] Everywhere Prospective Auth Received 17     Consent for EHR Access  (Deleted)      Insurance Card  (Deleted)      Patient ID  (Deleted)      Consent for Services/Privacy Notice - Hospital/Clinic  (Deleted)         Documents for the Encounter    CMS IM for Patient Signature Received 12/15/17     CMS IM for Patient Signature Received 17 2nd IMM    CMS IM for Patient Signature Received 17 2nd IMM    CE Point of Care Auth Received        Admission Information     Attending Provider Admitting Provider Admission Type Admission Date/Time    Roberto Carlos Waterman MD Rehman, Tauseef Ur, MD Emergency 17  171    Discharge Date Hospital Service Auth/Cert  Status Service Area     General Medicine [facility]    Unit Room/Bed Admission Status        3 MEDICAL SURGICAL 0300/0300-01 Admission (Confirmed)       Admission     Complaint    UTI (urinary tract infection)      Hospital Account     Name Acct ID Class Status Primary Coverage    Irwin Chuyita [id number] Inpatient Open MEDICARE - MEDICARE            Guarantor Account (for Hospital Account #[id number])     Name Relation to Pt Service Area Active? Acct Type    Irwin Chuyita Self FCS Yes Personal/Family    Address Phone          [address] [place] [address] [phone](H)  NONE(O)              Coverage Information (for Hospital Account #[id number])     1. MEDICARE/MEDICARE     F/O Payor/Plan Precert #    MEDICARE/MEDICARE     Subscriber Subscriber #    Chuyita Gayle [id number]    Address Phone    [employer]  [address]  [place] [address] [phone]          2. [place]     F/O Payor/Plan Precert #    [place]     Subscriber Subscriber #    Chuyita Gayle [id number]    Address Phone    [address] [address]  [place], [place] [id number] [phone]

## 2017-12-13 NOTE — ED NOTES
"Patient arrived via EMS from [facility] (she lives in the independent living section) with right arm pain and swelling. Per EMS report, she has been leaning more to one side for a couple of days and has had worsening gait problems (uses a walker in her own apartment) reported by staff. Patient reports she fell but is unable to elaborate or specify anything about the fall. MS started an IV and gave 60 mg of Fentanyl en route. Patient takes blood thinners.   "

## 2017-12-14 NOTE — PROGRESS NOTES
[facility] Nurse Inpatient Adult Pressure Injury (PI) Wound Assessment     Assessment of PI(s) on pt's:   Buttocks and Sudhir Heels    Data:   Patient History:      per MD note(s):    12/13  ASSESSMENT AND PLAN:  Chuyita Gayle is an 85-year-old female who presents to the hospital today with generalized weakness and a fall.  She was found to have a radial avulsion fracture on presentation to the hospital, for which she was placed in a sling.  During her stay in the ER, she was also noticed to be febrile with a fever of 38.4 and concerns for a urinary tract infection and early sepsis, and she is being admitted to the hospital.    Current mattress:  AtmosAir  Current pressure relieving devices:  Pillows    Moisture Management:  Diaper    Catheter secured? Not applicable    Current Diet / Nutrition:           Active Diet Order      Combination Diet Regular Diet Adult        Tai Assessment and sub scores:   Tai Score  Avg: 15.5  Min: 14  Max: 17   Labs:   Recent Labs   Lab Test  12/14/17   0735   02/14/17   2031   ALBUMIN   --    --   3.6                                                                                                                          Pressure Injury Assessment  (location #1Buttocks):       Wound History:   POA    Specific Dimensions (length x width x depth, in cm) :   5 x 4 cm. Resurfaced previous injury    Wound Base: new skin and some epidermal lifting ,  blanchable erythema,     Palpation of the wound bed:  normal    Periwound Skin: intact,      Color: normal and consistent with surrounding tissue    Temperature  normal     Pain:  severe , aching, sharp, throbing and pt. Moaning in pain when approached her room.  States she has pain in her back and head?    Pressure Injury Assessment  (location #2Bil Heels):       Wound History:   Pt. Has history falls    Wound Base: Sudhir Posterior heels ,  blanchable erythema, no open skin    Palpation of the wound bed:   normal    Periwound Skin: intact,      Color: normal and consistent with surrounding tissue    Temperature  normal     Pain:  absent          Intervention:     Patient's chart evaluated.      Tai Interventions:  Current Tai Interventions and Care Plan reviewed and updated, appropriate at this time.    Wound was fully assessed.    Wound Care: was done: Visual inspection    Application of clean dressing    Pt. Repositioned    Orders  Written    Supplies  Gathered    Discussed plan of care with Patient and Nurse           Assessment:     Pressure Injury located on Sudhir Heels and resolving Buttocks wounds,    Status: wound  initial assessment, Unchanged    Markedly improving         Plan:     Nursing to notify the Provider(s) and re-consult the WOC Nurse if wound(s) deteriorate(s)or if the wound care plan needs reevaluation.    Plan for wound care to wound located on chart: PIP measures, Sudhir Heel suspension boots ordered    WOC Nurse will return: prn  Face to face time: 16-30 Minutes

## 2017-12-14 NOTE — PROGRESS NOTES
"[facility]  Hospitalist Progress Note  Ramo Townsend MD 12/14/2017    Reason for Stay (Diagnosis): Fall, possibly associated with UTI         Assessment and Plan:      Summary of Stay: Chuyita Gayle is a 85 year old female came to attention on 12/13/2017 following a fall. She complained of right wrist and elbow pain and in the ED was found to have evidence of UTI with early Sepsis.    I note that in the ED, the patient was found to have a mildly elevated WBC, but no left shift. In addition, she had LGT and UA is suggestive of infection, though LE is \"small\". Ketones are positive, possibly related to decreased oral intake.     When I met the patient this am, she was repeating \"ow,ow,ow\". She reports that this discomfort has been present for the past 2-3 weeks and she \"hurts all over\". She states she has had some visual disturbance as well, but no specifics are able to be gleaned. RN states to me that they were unable to move the patient this am without intense discomfort.     Ms. Gayle resides at an Assisted Living and reportedly was sent in by staff.       Problem List:   1. Severe diffuse pain. ?PMR?  2. Right distal radius fracture.   3. Right shoulder pain, present from well before the hospitalization.   4. Possible UTI. On Ceftriaxone. (Aerococcus growing on the first day)    PLAN:  1. Check ESR, CRP, Procal, LFTs.   2. Empiric dose of Solumedrol 60 mg x 1 now.   3. Cont with empiric Ceftriaxone.    DVT Prophylaxis: Pneumatic Compression Devices  Code Status: Full Code  Discharge Dispo: TBD  Estimated Disch Date / # of Days until Disch: TBD        Interval History (Subjective):      Chart reviewed, pt interviewed.      Pt difficult to interview, as she is in so much pain.   Indicates taht her \"all-over\" pain has been present for 2-3 weeks. She apparently did fall and is especially tender over her right UE (both hand/wrist and shoulder). She is otherwise quite non-specific in her complaints.   " "               Physical Exam:      Last Vital Signs:  /62 (BP Location: Left arm)  Pulse 75  Temp 97.8  F (36.6  C) (Oral)  Resp 16  Ht 1.626 m (5' 4\")  Wt 65.2 kg (143 lb 11.2 oz)  SpO2 97%  BMI 24.67 kg/m2    I/O last 3 completed shifts:  In: 2083 [P.O.:240; I.V.:1843]  Out: 100 [Urine:100]    Constitutional: Awake, alert, very uncomfortable. She is obviously sensitive, but generally anxious with pain noted on even light touch of her right hand and wrist.   Respiratory: Clear to auscultation bilaterally, no crackles or wheezing   Cardiovascular: Regular rate and rhythm, normal S1 and S2, and no murmur noted   Abdomen: Normal bowel sounds, soft, non-distended, non-tender   Skin: No rashes, no cyanosis, dry to touch   Neuro: Limited eval due to anxiety and apparent intense pain   Extremities: No edema.   Other(s):        All other systems: Negative          Medications:      All current medications were reviewed with changes reflected in problem list.         Data:      All new lab and imaging data was reviewed.   Labs/Imaging:  Results for orders placed or performed during the hospital encounter of 12/13/17 (from the past 24 hour(s))   EKG 12 lead   Result Value Ref Range    Interpretation ECG Click View Image link to view waveform and result    Basic metabolic panel   Result Value Ref Range    Sodium 134 133 - 144 mmol/L    Potassium 4.1 3.4 - 5.3 mmol/L    Chloride 102 94 - 109 mmol/L    Carbon Dioxide 24 20 - 32 mmol/L    Anion Gap 8 3 - 14 mmol/L    Glucose 140 (H) 70 - 99 mg/dL    Urea Nitrogen 18 7 - 30 mg/dL    Creatinine 0.69 0.52 - 1.04 mg/dL    GFR Estimate 80 >60 mL/min/1.7m2    GFR Estimate If Black >90 >60 mL/min/1.7m2    Calcium 9.1 8.5 - 10.1 mg/dL   CBC with platelets differential   Result Value Ref Range    WBC 11.6 (H) 4.0 - 11.0 10e9/L    RBC Count 4.10 3.8 - 5.2 10e12/L    Hemoglobin 13.7 11.7 - 15.7 g/dL    Hematocrit 40.3 35.0 - 47.0 %    MCV 98 78 - 100 fl    MCH 33.4 (H) 26.5 - " 33.0 pg    MCHC 34.0 31.5 - 36.5 g/dL    RDW 12.1 10.0 - 15.0 %    Platelet Count 292 150 - 450 10e9/L    Diff Method Automated Method     % Neutrophils 64.6 %    % Lymphocytes 22.3 %    % Monocytes 12.5 %    % Eosinophils 0.1 %    % Basophils 0.2 %    % Immature Granulocytes 0.3 %    Nucleated RBCs 0 0 /100    Absolute Neutrophil 7.5 1.6 - 8.3 10e9/L    Absolute Lymphocytes 2.6 0.8 - 5.3 10e9/L    Absolute Monocytes 1.5 (H) 0.0 - 1.3 10e9/L    Absolute Eosinophils 0.0 0.0 - 0.7 10e9/L    Absolute Basophils 0.0 0.0 - 0.2 10e9/L    Abs Immature Granulocytes 0.0 0 - 0.4 10e9/L    Absolute Nucleated RBC 0.0    INR   Result Value Ref Range    INR 3.20 (H) 0.86 - 1.14   Lactic acid whole blood   Result Value Ref Range    Lactic Acid 0.6 (L) 0.7 - 2.0 mmol/L   Troponin I   Result Value Ref Range    Troponin I ES <0.015 0.000 - 0.045 ug/L   UA reflex to Microscopic and Culture   Result Value Ref Range    Color Urine [place]     Appearance Urine Cloudy     Glucose Urine Negative NEG^Negative mg/dL    Bilirubin Urine Negative NEG^Negative    Ketones Urine 20 (A) NEG^Negative mg/dL    Specific Gravity Urine 1.020 1.003 - 1.035    Blood Urine Negative NEG^Negative    pH Urine 5.0 5.0 - 7.0 pH    Protein Albumin Urine 100 (A) NEG^Negative mg/dL    Urobilinogen mg/dL 4.0 (H) 0.0 - 2.0 mg/dL    Nitrite Urine Negative NEG^Negative    Leukocyte Esterase Urine Small (A) NEG^Negative    Source Catheterized Urine     RBC Urine 1 0 - 2 /HPF    WBC Urine 104 (H) 0 - 2 /HPF    Mucous Urine Present (A) NEG^Negative /LPF   Urine Culture Aerobic Bacterial   Result Value Ref Range    Specimen Description Catheterized Urine     Special Requests Specimen received in preservative     Culture Micro (A)      >100,000 colonies/mL  Aerococcus urinae  Identification obtained by MALDI-TOF mass spectrometry research use only database. Test   characteristics determined and verified by the [facility]   ([facility]) [place],  [place].      Culture Micro Culture in progress    CT Head w/o Contrast    Narrative    CT HEAD WITHOUT CONTRAST  12/13/2017 7:08 PM    HISTORY: Fall, altered mental status.      TECHNIQUE: Scans were obtained through the head without IV contrast.  Radiation dose for this scan was reduced using automated exposure  control, adjustment of the mA and/or kV according to patient size, or  iterative reconstruction technique.    COMPARISON: 2/19/2017     FINDINGS: Moderate atrophy. 1 cm area of old infarction right frontal  periventricular region extending into the right basal ganglia.  Moderate chronic white matter disease likely small vessel ischemic  change. No hemorrhage, mass lesion, or focal area of acute infarction  identified. Paranasal sinuses are normal. No bony abnormality. No  change since prior head CT.      Impression    IMPRESSION:   1. Atrophy and chronic white matter disease.  2. 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  3. No interval change.    KIM DALE MD   Shoulder XR, G/E 3 views, right    Narrative    SHOULDER RIGHT THREE OR MORE VIEWS   12/13/2017 7:30 PM     HISTORY: Arm pain.     COMPARISON: None.    FINDINGS: Advanced chronic degenerative changes right shoulder.  Associated hypertrophic changes and cystic degenerative changes within  the subchondral region of the humeral head. No fracture or acute  appearing abnormality. Degenerative changes right AC joint with slight  widening of the AC joint probably chronic.      Impression    IMPRESSION: Advanced degenerative changes right shoulder and mild  degenerative changes right AC joint. Nothing acute.    KIM DALE MD   Elbow XR, G/E 3 views, right    Narrative    ELBOW RIGHT THREE OR MORE VIEWS   12/13/2017 7:30 PM     HISTORY: Fall, pain.     COMPARISON: None.    FINDINGS: Slight avulsion of the superior aspect of the radial head on  the lateral view is suspected. Exam otherwise negative.      Impression    IMPRESSION: Probable  tiny avulsion of the radial head.    KIM DALE MD   Wrist XR, G/E 3 views, right    Narrative    WRIST RIGHT THREE OR MORE VIEWS   12/13/2017 7:30 PM     HISTORY: Fall, pain.     COMPARISON: None.    FINDINGS: Chondrocalcinosis right wrist. Advanced degenerative changes  of the carpometacarpal articulation of the radial aspect of the right  wrist. Nothing acute.      Impression    IMPRESSION: Degenerative changes right wrist. Nothing acute. No  fracture identified.    KIM DALE MD   XR Chest 1 View    Narrative    CHEST ONE VIEW   12/13/2017 7:30 PM     HISTORY: Weakness.     COMPARISON: None.    FINDINGS: Degenerative changes right shoulder and prior left shoulder  arthroplasty. Heart and lungs negative.      Impression    IMPRESSION: Nothing acute. No infiltrates.    KIM DALE MD   Thoracic spine XR, 3 views    Narrative    THORACIC SPINE THREE VIEW   12/13/2017 7:30 PM     HISTORY: Fall, pain.     COMPARISON: None.    FINDINGS: Minimal hypertrophic changes thoracic spine. Generalized  mild narrowing of all of the thoracic disc spaces on a degenerative  basis. No fracture or acute appearing abnormality.      Impression    IMPRESSION: Degenerative changes throughout the thoracic spine but  nothing clearly acute.    KIM DALE MD   Lumbar spine XR, 2-3 views    Narrative    LUMBAR SPINE TWO - THREE VIEWS   12/13/2017 7:30 PM     HISTORY: Fall, pain;.     COMPARISON: None.    FINDINGS: Generalized narrowing of all lumbar interspaces with vacuum  sign of degenerative disc disease at each level. Grade I  spondylolisthesis of L4 on L5 probably related to facet joint disease  in the mid and lower lumbar spine. No fracture or acute abnormality.  Surgical clips right upper quadrant. Mild lumbar curve concave to the  left.      Impression    IMPRESSION: Advanced generalized degenerative disease of the lumbar  spine without acute appearing abnormality. No fracture.    KIM DALE MD   Blood  culture   Result Value Ref Range    Specimen Description Blood Left Arm     Special Requests Aerobic and anaerobic bottles received     Culture Micro No growth after 13 hours    Blood culture   Result Value Ref Range    Specimen Description Blood Left Hand     Culture Micro No growth after 13 hours    Basic metabolic panel   Result Value Ref Range    Sodium 138 133 - 144 mmol/L    Potassium 3.6 3.4 - 5.3 mmol/L    Chloride 105 94 - 109 mmol/L    Carbon Dioxide 26 20 - 32 mmol/L    Anion Gap 7 3 - 14 mmol/L    Glucose 98 70 - 99 mg/dL    Urea Nitrogen 16 7 - 30 mg/dL    Creatinine 0.68 0.52 - 1.04 mg/dL    GFR Estimate 82 >60 mL/min/1.7m2    GFR Estimate If Black >90 >60 mL/min/1.7m2    Calcium 8.5 8.5 - 10.1 mg/dL   CBC with platelets differential   Result Value Ref Range    WBC 8.8 4.0 - 11.0 10e9/L    RBC Count 3.56 (L) 3.8 - 5.2 10e12/L    Hemoglobin 11.9 11.7 - 15.7 g/dL    Hematocrit 35.6 35.0 - 47.0 %     78 - 100 fl    MCH 33.4 (H) 26.5 - 33.0 pg    MCHC 33.4 31.5 - 36.5 g/dL    RDW 12.0 10.0 - 15.0 %    Platelet Count 253 150 - 450 10e9/L    Diff Method Automated Method     % Neutrophils 60.7 %    % Lymphocytes 23.9 %    % Monocytes 13.9 %    % Eosinophils 1.0 %    % Basophils 0.2 %    % Immature Granulocytes 0.3 %    Nucleated RBCs 0 0 /100    Absolute Neutrophil 5.3 1.6 - 8.3 10e9/L    Absolute Lymphocytes 2.1 0.8 - 5.3 10e9/L    Absolute Monocytes 1.2 0.0 - 1.3 10e9/L    Absolute Eosinophils 0.1 0.0 - 0.7 10e9/L    Absolute Basophils 0.0 0.0 - 0.2 10e9/L    Abs Immature Granulocytes 0.0 0 - 0.4 10e9/L    Absolute Nucleated RBC 0.0    INR   Result Value Ref Range    INR 3.57 (H) 0.86 - 1.14   Hepatic panel   Result Value Ref Range    Bilirubin Direct 0.2 0.0 - 0.2 mg/dL    Bilirubin Total 1.0 0.2 - 1.3 mg/dL    Albumin 2.4 (L) 3.4 - 5.0 g/dL    Protein Total 6.7 (L) 6.8 - 8.8 g/dL    Alkaline Phosphatase 61 40 - 150 U/L    ALT 12 0 - 50 U/L    AST 17 0 - 45 U/L   CRP inflammation   Result Value Ref  Range    CRP Inflammation 180.0 (H) 0.0 - 8.0 mg/L   Erythrocyte sedimentation rate auto   Result Value Ref Range    Sed Rate 73 (H) 0 - 30 mm/h

## 2017-12-14 NOTE — PLAN OF CARE
Problem: Patient Care Overview  Goal: Plan of Care/Patient Progress Review  PT: Orders received and per discussion with OT, will hold PT eval this date d/t pt awaiting MD consult, labs and c/o pain multiple sites and unable to tolerate activity. Will reschedule.

## 2017-12-14 NOTE — H&P
[facility]    History and Physical  Hospitalist    Name: Chuyita Gayle    MRN: [id number]  YOB: 1932    Age: 85 year old  Primary care provider: Sen Flores       Date of Admission:  12/13/2017  Date of Service (when I saw the patient): 12/13/17    PRIMARY CARE PHYSICIAN:  Dr. Sen Flores      ASSESSMENT AND PLAN:  Chuyita Gayle is an 85-year-old female who presents to the hospital today with generalized weakness and a fall.  She was found to have a radial avulsion fracture on presentation to the hospital, for which she was placed in a sling.  During her stay in the ER, she was also noticed to be febrile with a fever of 38.4 and concerns for a urinary tract infection and early sepsis, and she is being admitted to the hospital.     1.  Concerns for early sepsis based on her fevers and leukocytosis.   She appears nontoxic at this time.  Lactic acid is normal and hemodynamics are stable.  Closely monitor for any worsening.   -- Possibly related to urinary tract infection; however, very important to rule out underlying bacteremia as the cause of her ongoing weakness and other issues.  We will follow her final blood and urine cultures.  At this time, she will be covered with intravenous ceftriaxone and will closely monitoring of her clinical condition.   -- Another concern would be if this is related to her right shoulder joint. As discussed below, she recently had MRI imaging of this showing degenerative changes and having issues with pain. There is no obvious septic arthritis, but I would ask orthopedics for evaluation,     2.  Right tiny avulsion of the radial head.  The patient is placed in a sling for this in the Emergency Room.  I anticipate this will be nonoperative treatment; however, will request a formal consultation from Orthopedics to advise.    -- Patient is cautious use of pain medications, given her advanced age. She is on multiple different narcotics at baseline, I will have  her on only oxycodone and PRN IV dilaudid for now to avoid confusion.     3.  Atrial fibrillation, on chronic warfarin therapy.  INR is currently 3.2 today.  Will have the Pharmacy resume her warfarin based on Pharmacy protocol.  Will also resume her baseline Atenolol 50 mg b.i.d. with hold parameters.     4.  Recent issues with right shoulder pain.  It appears that the patient had workup for this at an outside hospital with MRI imaging which showed changes in the humeral head with degenerative changes and flattening and concern for fracture of the subchondral plate.  Neoplastic etiology was felt to be less likely, not completely excluded.  It appears that this was done at [facility] on the 28th of November.  The patient was advised outpatient followup with the primary care provider, although I am not sure if they have seen anyone yet.  It would be helpful to have Orthopedics evaluate this during this admission.     5.  Underlying history of asthma.  Resume her baseline inhalers including Advair once the medication list has been reconciled by the Pharmacy.     6.  We will resume the patient's other chronic medications after review as appropriate.     7.  Fall.  This was reported to be mechanical in nature.  The patient will have Physical Therapy and Occupational Therapy consultation.      CODE STATUS:  Full code.      DEEP VENOUS THROMBOSIS PROPHYLAXIS:  The patient already on therapeutic warfarin.      Plan of care has been discussed with the patient.  She is somewhat sedated after receiving oral pain medications.  I did attempt to call her granddaughter, Manisha, but I was unable to reach her at this time of the night.  Consider reaching her tomorrow morning.  This patient will be admitted as an inpatient, given her significant pain requiring IV narcotics, advanced age, debilitation and concerns for early sepsis requiring further workup.       CHIEF COMPLAINT:  Fall.      HISTORY OF PRESENT ILLNESS:  Chuyita  Irwin is an 85-year-old female who comes to the hospital after a fall with generalized weakness.  History is obtained mostly from the ER per Dr. Hernandez as the patient is somewhat sedated on my exam after receiving pain medications.  The patient lives in an assisted living facility.  She has been having increasing right arm pain and trouble with her gait recently.  She had a fall.  Initially, it was thought to be unwitnessed, although the patient told the ER provider that she was transferring when she fell and had increased right elbow pain afterwards.  Of note, this is in the background of ongoing issues with right shoulder pain, for which she was seen in the ER at [facility] last month.  She denies having any chest pain, abdominal pain on my exam.  She was found to be febrile and with concerns for some infectious process going on, and is being admitted to the hospital for IV antibiotics and further evaluation.  She cannot really tell me how long the fevers have been going on or if there are really any other infectious symptoms like diarrhea, nausea or vomiting.    PAST MEDICAL HISTORY:   1.  Atrial fibrillation.   2.  Anxiety.   3.  Fibromyalgia.   4.  Hypertension.   5.  Asthma.      PAST SURGICAL HISTORY:   1.  Left shoulder replacement.   2.  Right knee replacement.   3.  Back surgery in the 1960s.   4.  Tonsillectomy and adenoidectomy.      REVIEW OF SYSTEMS:  Unable to obtain due to the patient's confusion and lethargy.      LABORATORY DATA AND IMAGING:  All laboratory and imaging has been reviewed. The outside records have been reviewed as well.      SOCIAL HISTORY:  She is a nonsmoker.  No history of heavy alcohol use.  Lives in an assisted living facility.      FAMILY HISTORY:  Unable to obtain, given the patient's mental status and confusion.      PHYSICAL EXAMINATION:   VITAL SIGNS:  Blood pressure is 150/70, heart rate is 70, respiratory rate is 18, oxygen 98% on 2 liters oxygen.  Temperature is  97.8, T-max was 38.4.   GENERAL APPEARANCE:  Elderly female, somewhat tired and slightly sedated, lying in the bed, able to open eyes and answer basic questions.   HEENT:  Normocephalic, atraumatic.  Oral mucosa is dry.   NECK:  Supple.   CARDIOVASCULAR:  She has a regular rate and rhythm, normal S1, S2.  No loud murmurs or gallop.   PULMONARY:  Lungs are clear to auscultation bilaterally with good air entry on both sides.   ABDOMEN:  Soft, nontender, nondistended, positive bowel sounds, no rebound or guarding.   EXTREMITIES:  Her right shoulder and elbow is in a sling, unable to do further detailed range of motion on those joints due to her pain.  Able to move her fingers.  Appeared to have some chronic-appearing deformities.   NEUROLOGIC:  She is tired-appearing but able to wake up and knows she is in the hospital, able to answer basic questions.  No gross focal deficits are noted.   PSYCHIATRIC:  Cooperative.  No acute agitation or hallucinations.      EKG was personally reviewed by me and shows sinus rhythm with premature atrial complexes.      Imaging from 11/20 at OSH  MRI Shoulder, Right  1.  Examination was ended early due to patient discomfort and motion.  2.  Abnormality in the humeral head is again seen with associated severe degenerative change and flattening and likely fracturing of the subchondral plate of the humeral head. Findings can be seen with a combination of degeneration, osteonecrosis and ongoing articular surface fracturing/collapse. Underlying neoplastic etiology is felt to be less likely, however it is not entirely excluded. Recommend short-term follow-up with CT if the patient cannot undergo a completion MRI.  3.  Intramuscular edema within the supraspinatus and infraspinatus is either related to a strain, neurogenic edema or contusion.  CT Shoulder, Right:   1.  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  2.  Prominent lucent lesion in the humeral head  may simply represent a large atypical degenerative subchondral cyst, however, further evaluation with MRI would be helpful.  Xray Shoulder, Right  No acute fractures or dislocation, but there is irregular lucency with partial sclerosis in the humeral head measuring 2.7 x 2.6 cm. An osteolytic bone lesion or neoplasm is not excluded. Please consider further evaluation with cross-sectional imaging. There are degenerative changes in the glenohumeral joint and mild degenerative change in the AC joint.        Data   Data reviewed today:  I personally reviewed     Lab data and imaging results from today have been reviewed.       Recent Labs  Lab 12/13/17  1820   WBC 11.6*   HGB 13.7   MCV 98      INR 3.20*      POTASSIUM 4.1   CHLORIDE 102   CO2 24   BUN 18   CR 0.69   ANIONGAP 8   TRACEE 9.1   *   TROPI <0.015       Recent Results (from the past 24 hour(s))   CT Head w/o Contrast    Narrative    CT HEAD WITHOUT CONTRAST  12/13/2017 7:08 PM    HISTORY: Fall, altered mental status.      TECHNIQUE: Scans were obtained through the head without IV contrast.  Radiation dose for this scan was reduced using automated exposure  control, adjustment of the mA and/or kV according to patient size, or  iterative reconstruction technique.    COMPARISON: 2/19/2017     FINDINGS: Moderate atrophy. 1 cm area of old infarction right frontal  periventricular region extending into the right basal ganglia.  Moderate chronic white matter disease likely small vessel ischemic  change. No hemorrhage, mass lesion, or focal area of acute infarction  identified. Paranasal sinuses are normal. No bony abnormality. No  change since prior head CT.      Impression    IMPRESSION:   1. Atrophy and chronic white matter disease.  2. 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  3. No interval change.    KIM DALE MD   Shoulder XR, G/E 3 views, right    Narrative    SHOULDER RIGHT THREE OR MORE VIEWS   12/13/2017 7:30 PM      HISTORY: Arm pain.     COMPARISON: None.    FINDINGS: Advanced chronic degenerative changes right shoulder.  Associated hypertrophic changes and cystic degenerative changes within  the subchondral region of the humeral head. No fracture or acute  appearing abnormality. Degenerative changes right AC joint with slight  widening of the AC joint probably chronic.      Impression    IMPRESSION: Advanced degenerative changes right shoulder and mild  degenerative changes right AC joint. Nothing acute.    KIM DALE MD   Elbow XR, G/E 3 views, right    Narrative    ELBOW RIGHT THREE OR MORE VIEWS   12/13/2017 7:30 PM     HISTORY: Fall, pain.     COMPARISON: None.    FINDINGS: Slight avulsion of the superior aspect of the radial head on  the lateral view is suspected. Exam otherwise negative.      Impression    IMPRESSION: Probable tiny avulsion of the radial head.    KIM DALE MD   Wrist XR, G/E 3 views, right    Narrative    WRIST RIGHT THREE OR MORE VIEWS   12/13/2017 7:30 PM     HISTORY: Fall, pain.     COMPARISON: None.    FINDINGS: Chondrocalcinosis right wrist. Advanced degenerative changes  of the carpometacarpal articulation of the radial aspect of the right  wrist. Nothing acute.      Impression    IMPRESSION: Degenerative changes right wrist. Nothing acute. No  fracture identified.    KIM DALE MD   XR Chest 1 View    Narrative    CHEST ONE VIEW   12/13/2017 7:30 PM     HISTORY: Weakness.     COMPARISON: None.    FINDINGS: Degenerative changes right shoulder and prior left shoulder  arthroplasty. Heart and lungs negative.      Impression    IMPRESSION: Nothing acute. No infiltrates.    KIM DALE MD   Thoracic spine XR, 3 views    Narrative    THORACIC SPINE THREE VIEW   12/13/2017 7:30 PM     HISTORY: Fall, pain.     COMPARISON: None.    FINDINGS: Minimal hypertrophic changes thoracic spine. Generalized  mild narrowing of all of the thoracic disc spaces on a degenerative  basis. No  fracture or acute appearing abnormality.      Impression    IMPRESSION: Degenerative changes throughout the thoracic spine but  nothing clearly acute.    KIM DALE MD   Lumbar spine XR, 2-3 views    Narrative    LUMBAR SPINE TWO - THREE VIEWS   2017 7:30 PM     HISTORY: Fall, pain;.     COMPARISON: None.    FINDINGS: Generalized narrowing of all lumbar interspaces with vacuum  sign of degenerative disc disease at each level. Grade I  spondylolisthesis of L4 on L5 probably related to facet joint disease  in the mid and lower lumbar spine. No fracture or acute abnormality.  Surgical clips right upper quadrant. Mild lumbar curve concave to the  left.      Impression    IMPRESSION: Advanced generalized degenerative disease of the lumbar  spine without acute appearing abnormality. No fracture.    MD LB BROOKS MD             D: 2017 23:23   T: 2017 23:59   MT:       Name:     EDISON WATTS   MRN:      [id number]        Account:      [id number]   :      1932           Admitted:     [id number]      Document: [id number]

## 2017-12-14 NOTE — PLAN OF CARE
Problem: Fracture Orthopaedic (Adult)  Goal: Signs and Symptoms of Listed Potential Problems Will be Absent, Minimized or Managed (Fracture Orthopaedic)  Signs and symptoms of listed potential problems will be absent, minimized or managed by discharge/transition of care (reference Fracture Orthopaedic (Adult) CPG).   Outcome: No Change   12/14/17 0108   Fracture Orthopaedic   Problems Assessed (Orthopaedic Fracture) all   Problems Present (Orthopaedic Fracture) functional deficit/self-care deficit;infection;pain;situational response  (UTI)   Tele: SR, HR: 67 & D/Cd, no order. Physical assessment WDL w/exceptions as noted on Adult PCS flowsheet. C/O pain to right arm, medicated, remains in sling, (+) CMS, difficult assist of 2 w/transfer belt for transfers to [facility] D/T fearful of falling resulting in pt working against staff & stiff w/moving. OT/PT consults pending for later today. Refer to VS, I/O, Adult PCS for full assessment & shift details. Disposition anticipated for returning to PTA living situation, [facility], when medically stable.  Jojo Chase, KELVINN, RN  Medical/Telemetry - 3

## 2017-12-14 NOTE — ED NOTES
Dr placed order for abx prior to b/c's. abx running for approx 2-3 min and then stopped. Waiting for collection of BC.

## 2017-12-14 NOTE — PLAN OF CARE
Problem: Patient Care Overview  Goal: Plan of Care/Patient Progress Review  OT: orders received and per consult with nursing OT held until tomorrow d/t pt awaiting MD consult, labs and c/o pain multiple sites and unable to tolerate activity. Will reschedule.

## 2017-12-14 NOTE — ED NOTES
"Per note written from [facility] staff on transfer papers \" unable to meet residents needs at this time with pain & cares d/t pain. Do not send back until pain is controlled.\"  "

## 2017-12-14 NOTE — ED NOTES
[facility]  ED Nurse Handoff Report    Chuyita Gayle is a 85 year old female   ED Chief complaint: Arm Pain and Generalized Weakness  . ED Diagnosis:   Final diagnoses:   Dysuria     Allergies:   Allergies   Allergen Reactions     Nabumetone Rash     Sulfa Drugs Rash     Vioxx [Rofecoxib] Rash     Celecoxib      Nabumetone      Sulfa Drugs      Vioxx [Rofecoxib]      Celebrex [Celecoxib] Rash       Code Status: Full Code  Activity level - Baseline/Home:  Independent. Activity Level - Current:   Stand with Assist of 2. Lift room needed: No. Bariatric: No   Needed: No   Isolation: No. Infection: Not Applicable.     Vital Signs:   Vitals:    12/13/17 2030 12/13/17 2045 12/13/17 2100 12/13/17 2115   BP: 154/83 161/78 176/87 165/84   Pulse:       Resp:       Temp:       TempSrc:       SpO2:  98% 99% 100%       Cardiac Rhythm:  ,      Pain level: 0-10 Pain Scale: 10  Patient confused: Yes. Patient Falls Risk: Yes.   Elimination Status: Has voided   Patient Report - Initial Complaint:arm pain. Focused Assessment: arm pain   Tests Performed: labscan. Abnormal Results:   Labs Ordered and Resulted from Time of ED Arrival Up to the Time of Departure from the ED   UA MACROSCOPIC WITH REFLEX TO MICRO AND CULTURE - Abnormal; Notable for the following:        Result Value    Ketones Urine 20 (*)     Protein Albumin Urine 100 (*)     Urobilinogen mg/dL 4.0 (*)     Leukocyte Esterase Urine Small (*)     WBC Urine 104 (*)     Mucous Urine Present (*)     All other components within normal limits   BASIC METABOLIC PANEL - Abnormal; Notable for the following:     Glucose 140 (*)     All other components within normal limits   CBC WITH PLATELETS DIFFERENTIAL - Abnormal; Notable for the following:     WBC 11.6 (*)     MCH 33.4 (*)     Absolute Monocytes 1.5 (*)     All other components within normal limits   INR - Abnormal; Notable for the following:     INR 3.20 (*)     All other components within normal  limits   LACTIC ACID WHOLE BLOOD - Abnormal; Notable for the following:     Lactic Acid 0.6 (*)     All other components within normal limits   TROPONIN I   STRAIGHT CATH FOR URINE   CARDIAC CONTINUOUS MONITORING   URINE CULTURE AEROBIC BACTERIAL   BLOOD CULTURE   BLOOD CULTURE     .   Treatments provided: below  Family Comments: n  OBS brochure/video discussed/provided to patient:  N/A  ED Medications:   Medications   HYDROmorphone (PF) (DILAUDID) injection 0.5 mg (not administered)   HYDROmorphone (PF) (DILAUDID) injection 0.5 mg (0.5 mg Intravenous Given 12/13/17 1834)   acetaminophen (TYLENOL) tablet 650 mg (650 mg Oral Given 12/13/17 1948)   cefTRIAXone in d5w (ROCEPHIN) intermittent infusion 1 g (1 g Intravenous Not Given 12/13/17 2114)   oxyCODONE IR (ROXICODONE) tablet 5 mg (5 mg Oral Given 12/13/17 2110)     Drips infusing:  Yes  For the majority of the shift, the patient's behavior Green. Interventions performed were n.     Severe Sepsis OR Septic Shock Diagnosis Present: No      ED Nurse Name/Phone Number: Maik Arroyo,   9:43 PM    RECEIVING UNIT ED HANDOFF REVIEW    Above ED Nurse Handoff Report was reviewed: Yes  Reviewed by: Mitra Silver on December 13, 2017 at 10:02 PM

## 2017-12-14 NOTE — PLAN OF CARE
Problem: Fracture Orthopaedic (Adult)  Goal: Signs and Symptoms of Listed Potential Problems Will be Absent, Minimized or Managed (Fracture Orthopaedic)  Signs and symptoms of listed potential problems will be absent, minimized or managed by discharge/transition of care (reference Fracture Orthopaedic (Adult) CPG).   Pt alert and oriented X 4. Attempted oksana steady, unsuccessful. Max temp 100.1 (o). Pt screaming/crying states pain is all over 10/10. Dilaudid increased, repositioned every 2 hours.

## 2017-12-14 NOTE — ED NOTES
Pt not able to answer questions and direct me to where pain is located. Keeps listing to left in bed and slurring speech. Po pain med given but not IV pain med.

## 2017-12-14 NOTE — PHARMACY-ADMISSION MEDICATION HISTORY
Admission medication history interview status for this patient is complete. See [facility] admission navigator for allergy information, prior to admission medications and immunization status.     Medication history interview source(s):Patient  Medication history resources (including written lists, pill bottles, clinic record):[facility]  Primary pharmacy:-    Changes made to PTA medication list:  Added: all listed meds  Deleted: -  Changed: -    Actions taken by pharmacist (provider contacted, etc):None     Additional medication history information:None    Medication reconciliation/reorder completed by provider prior to medication history? No    Do you take OTC medications (eg tylenol, ibuprofen, fish oil, eye/ear drops, etc)?   yes(Y/N)    For patients on insulin therapy: no (Y/N)  Lantus/levemir/NPH/Mix 70/30 dose:   (Y/N) (see Med list for doses)   Sliding scale Novolog Y/N  If Yes, do you have a baseline novolog pre-meal dose:  units with meals  Patients eat three meals a day:   Y/N    How many episodes of hypoglycemia do you have per week: _______  How many missed doses do you have per week: ______  How many times do you check your blood glucose per day: _______   Any Barriers to therapy - Be specific :  cost of medications, comfortable with giving injections (if applicable), comfortable and confident with current diabetes regimen: Y/N ______________      Prior to Admission medications    Medication Sig Last Dose Taking? Auth Provider   Menthol-Zinc Oxide (CALMOSEPTINE EX) Externally apply topically 2 times daily  12/13/2017 at x1 Yes Reported, Patient   Warfarin Sodium (JANTOVEN PO) 2.5 mg on Tue, Wed, Fri, Sat, Sun  Yes Reported, Patient   latanoprost (XALATAN) 0.005 % ophthalmic solution Place 1 drop into both eyes At Bedtime  12/12/2017 at Unknown time Yes Reported, Patient   OMEPRAZOLE PO Take 20 mg by mouth every morning  12/13/2017 at Unknown time Yes Reported, Patient   Misc Natural Products (OSTEO BI-FLEX ADV  DOUBLE ST PO) Take 1 tablet by mouth 2 times daily  12/13/2017 at x1 Yes Reported, Patient   cetirizine (ZYRTEC) 10 MG tablet Take 10 mg by mouth daily 12/13/2017 at Unknown time Yes Reported, Patient   Fish Oil-Cholecalciferol (FISH OIL + D3 PO)   Yes Reported, Patient   GABAPENTIN PO Take 600 mg by mouth At Bedtime  12/12/2017 at Unknown time Yes Reported, Patient   multivitamin, therapeutic with minerals (MULTI-VITAMIN) TABS tablet Take 1 tablet by mouth daily 12/13/2017 at Unknown time Yes Reported, Patient   TRAMADOL HCL PO Take 25 mg by mouth 3 times daily  12/13/2017 at x2 Yes Reported, Patient   VITAMIN D, CHOLECALCIFEROL, PO Take 1,000 Units by mouth daily  12/13/2017 at Unknown time Yes Reported, Patient   Menthol-Zinc Oxide (CALMOSEPTINE EX)   Yes Reported, Patient   Albuterol (VENTOLIN IN)   Yes Reported, Patient   trolamine salicylate (ASPERCREME) 10 % cream Apply topically 2 times daily 12/13/2017 at am Yes Unknown, Entered By History   fluticasone-vilanterol (BREO ELLIPTA) 100-25 MCG/INH oral inhaler Inhale 1 puff into the lungs daily 12/13/2017 at Unknown time Yes Unknown, Entered By History   Omega-3 Fatty Acids (FISH OIL) 1200 MG capsule Take 1,200 mg by mouth daily 12/13/2017 at Unknown time Yes Unknown, Entered By History   fluticasone (FLONASE) 50 MCG/ACT spray Spray 2 sprays into both nostrils daily 12/13/2017 at Unknown time Yes Unknown, Entered By History   HYDROcodone-acetaminophen (NORCO) 5-325 MG per tablet Take 1 tablet by mouth At Bedtime 12/12/2017 at Unknown time Yes Unknown, Entered By History   senna-docusate (SENOKOT-S;PERICOLACE) 8.6-50 MG per tablet Take 1 tablet by mouth daily 12/13/2017 at Unknown time Yes Unknown, Entered By History   menthol-zinc oxide (CALMOSEPTINE) 0.44-20.625 % OINT ointment Apply topically every hour as needed for skin protection  Yes Unknown, Entered By History   HYDROcodone-acetaminophen (NORCO) 5-325 MG per tablet Take 1 tablet by mouth every 6 hours as  needed for moderate to severe pain  Yes Unknown, Entered By History   pramox-pe-glycerin-petrolatum (PREPARATION H) 1-0.25-14.4-15 % CREA cream Place rectally as needed for hemorrhoids  Yes Unknown, Entered By History   senna-docusate (SENOKOT-S;PERICOLACE) 8.6-50 MG per tablet Take 1 tablet by mouth daily as needed for constipation  Yes Unknown, Entered By History   albuterol (PROAIR HFA/PROVENTIL HFA/VENTOLIN HFA) 108 (90 BASE) MCG/ACT Inhaler Inhale 2 puffs into the lungs 2 times daily as needed for shortness of breath / dyspnea or wheezing  Yes Unknown, Entered By History   atenolol (TENORMIN) 25 MG tablet Take 50 mg by mouth 2 times daily  12/13/2017 at x1 Yes Unknown, Entered By History

## 2017-12-14 NOTE — PLAN OF CARE
Problem: Patient Care Overview  Goal: Plan of Care/Patient Progress Review  Pt admitted this 2240. 9/10 generalized pain, crying out- controlled with PRN dilaudid. Blanchable red area on right buttocks, dressing was in place on pt's arrival. Oriented to room and call light.

## 2017-12-14 NOTE — ED NOTES
Spoke with granddaughter about patient- and updated her on plan of care. Granddaughter asked if she would be admitted to the hospital tonight as she is traveling at 6 am. MD notified will update family with results in a few hours

## 2017-12-14 NOTE — PHARMACY-ANTICOAGULATION SERVICE
Clinical Pharmacy - Warfarin Dosing Consult     Pharmacy has been consulted to manage this patient s warfarin therapy.  Indication: Atrial Fibrillation  Therapy Goal: INR 2-3  Warfarin Prior to Admission: Yes  Warfarin PTA Regimen: 2.5mg [id number]  Recent documented change in oral intake/nutrition: Unknown    INR   Date Value Ref Range Status   12/13/2017 3.20 (H) 0.86 - 1.14 Final   02/19/2017 2.25 (H) 0.86 - 1.14 Final       Recommend no warfarin overnite [overnite admission] d/t supratherapeutic INR.  Pharmacy will monitor Chuyita Irwin daily and order warfarin doses to achieve specified goal.      Please contact pharmacy as soon as possible if the warfarin needs to be held for a procedure or if the warfarin goals change.

## 2017-12-15 NOTE — PROGRESS NOTES
Ortho here to see pt, plan conservative treatment at this time with pain medication and sling.  Plan for OP ortho f/u post discharge, approx 1 week. Placed call to pt's granddaughter Manisha per request with update following ortho consult. She is concerned that pt will go back to her nursing home and not be able to care for herself with current pain level.  She requests that SW/PT/OT strongly consider TCU placement at DC, and also requests SW call to her with DC plan as soon as it is known as [place].

## 2017-12-15 NOTE — CONSULTS
ORTHOPEDIC SURGERY CONSULTATION      DIAGNOSES:   1.  Severe osteoarthritis, right shoulder.   2.  Possible nondisplaced radial head fracture, right elbow.      REQUESTING PHYSICIAN:  Roberto Carlos Waterman MD      HISTORY OF PRESENT ILLNESS:  Ms. Chuyita Gayle is a very pleasant 85-year-old female with history of atrial fibrillation, who is on Coumadin, and who presented to the emergency room for right arm pain and swelling, and generalized weakness.  EMS, on presentation, reported that the patient lives in an independent section of an assisted living facility, and the nurses noticed that her right arm was painful and swollen, along with a worsening gait for the last few days.  She had a fall that was unwitnessed by staff.  On admission, she was found to have a urinary tract infection and was febrile, and was admitted for possible sepsis, and evaluation of her right elbow and shoulder.      PAST MEDICAL AND SURGICAL HISTORY:  Significant for atrial fibrillation, anxiety, fibromyalgia, and asthma.  She has had previous back surgeries, cholecystectomy, GI surgery, and left shoulder replacement.      MEDICATIONS ON ADMISSION:  Norco, Tenormin, Tylenol, Breo inhaler, Neurontin, Prilosec, Coumadin, Naprosyn, Valtrex, Ativan, albuterol inhaler.      ALLERGIES:  Nabumetone, sulfa, Vioxx, Celebrex.      FAMILY HISTORY:  Noncontributory.      SOCIAL HISTORY:  She lives in [facility] living.      REVIEW OF SYSTEMS:  Ten-point review of systems positive for right shoulder pain, and right elbow and hand swelling recently, and some memory loss.      PHYSICAL EXAMINATION:     GENERAL:  She is lying in her hospital bed in no apparent distress.  She is a somewhat lethargic, elderly-appearing lady.  She is alert and conversant.   VITAL SIGNS:  Her maximum temperature was 101.2.  Otherwise, vital signs are stable.   HEAD:  No obvious head trauma.   LEFT UPPER EXTREMITY:  Within normal limits.  Skin is clean, dry and intact.   Palpable radial pulse.  Radial, ulnar and median nerve sensation/function intact.   RIGHT UPPER EXTREMITY:  Radial, ulnar and median nerve sensation/function intact.  Her hand is swollen.  She is tender to palpation around her wrist, hand and elbow, and in particular around her shoulder.  She has pain with any range of motion around the shoulder which is quite acute.  Skin is otherwise clean, dry and intact.  There is no real erythema and no warmth.  There is some crepitus with range of motion of the shoulder, less with range of motion of the elbow.  Palpable radial pulse.   RIGHT AND LEFT LOWER EXTREMITIES:  Skin is clean, dry and intact.  Palpable dorsalis pedis pulses.  EHL, FHL, tibias anterior, and gastroc soleus fire bilaterally.  Nontender to palpation.  No crepitus.  Normal range of motion of both lower extremities.      IMAGING:  X-ray reads of the right shoulder show severe degenerative changes of the right shoulder with a somewhat lytic lesion in the subchondral region of the femoral head.  No acute obvious fracture.  Degenerative changes of the AC joint.      Two-view x-rays of the right elbow show possible superior avulsion fracture of the radial head.  No other obvious fracture lines visible.  Osteopenia.      Lumbar spine shows advanced generalized degenerative changes of the lumbar spine without acute-appearing abnormality.      Right wrist shows degenerative change of the right wrist.  No acute fracture identified.  CMC joint is quite arthritic.      LABORATORY DATA:  Sodium is 138, potassium 3.6, creatinine 0.68.  CRP is 180.  Procalcitonin is 1.15.  White blood cell count 8.8, hemoglobin 11.9.  Sed rate 73.  INR is 3.57.  Blood cultures are negative.  Urine culture is positive.      IMPRESSION/REPORT/PLAN:  I had a long discussion with Ms. Gyale regarding her right shoulder pain, and elbow and wrist pain.  At the elbow, if she has any injury at all, it is a fairly mild nondisplaced radial head  fracture, but I really do not know if there is any significant bony abnormality.  She does have a significant amount of swelling of her elbow and around her elbow, but her main pain is around her shoulder which has severe degenerative changes throughout.  Treatment of that would be likely related more to joint replacement if surgical intervention were necessary, but we would not proceed with anything near that at this point.  There is no evidence for joint infection, as the shoulder is not warm to the touch, or red or inflamed, just painful.  I discussed all of this with her.  We will continue to observe her, and arrange for follow-up as an outpatient in about 5-7 days.         KATIE HWANG MD             D: 2017 20:50   T: 12/15/2017 07:21   MT: CAROLINA#[id number]      Name:     EDISON WATTS   MRN:      [id number]        Account:       [id number]   :      1932           Consult Date:  2017      Document: [id number]       cc: Sen Flores MD

## 2017-12-15 NOTE — PLAN OF CARE
Problem: Patient Care Overview  Goal: Plan of Care/Patient Progress Review  Outcome: No Change  [id number]: Minimal responses to assessment question this shift. disoriented to time, place and situation, crying and moaning with movement. PRN dilaudid given with relief. On continuous pulse ox. Oxygen at 2L per NC, sats mid 90's, rest VS. Non-tele, lung sounds diminished. Cont on IVF at 75 mL/hr, Rocephin and steroid. BC pending with no growth as of 12/13 x2. INR 3.57, coumadin held last night. Hartman patent with adequate output. Sling to R arm, turn and repo encouraged. Mepilex to buttocks, heel protector to BLE. WOCN,Ortho, SW, PT/OT consults in place. Family hoping patient would be discharged to TCU, will cont with POC

## 2017-12-15 NOTE — PLAN OF CARE
"Problem: Fracture Orthopaedic (Adult)  Goal: Signs and Symptoms of Listed Potential Problems Will be Absent, Minimized or Managed (Fracture Orthopaedic)  Signs and symptoms of listed potential problems will be absent, minimized or managed by discharge/transition of care (reference Fracture Orthopaedic (Adult) CPG).   Pt disoriented to time and place. Pt reports pain 10/10, severe \"all over.\" IV dilaudid administered X 2 this shift with minimal relief, pt crying and agitated. Repositioning and ice applied to RUE. Pt out of bed heavy assist of 2 and oksana rico with OT, see note for recommendations. Pt ate bites of food today up in the chair.       "

## 2017-12-15 NOTE — PLAN OF CARE
Problem: Patient Care Overview  Goal: Plan of Care/Patient Progress Review  Orders received, eval completed and treatment initiated. Pt reports that she lived in an retirement, where she was independent with ADL's. Pt used a walker a baseline.  Pt not able to articulate history very well due to pain during our eval session. Pt notes some baseline forgetfulness. She has A for meals, medication management and finances.  Pt does not drive, and reports she does not leave her KATE.  Discharge Planner OT   Patient plan for discharge: Home  Current status: Pt is max A x2 for transfer supine>sit EOB. Pt max Ax2 sit>stand. Pt not able to ambulate, BSC  was brought behind pt for her to sit down.  Max A for pericares.  Maria steady used for pt BSC>bedside chair.  Pt c/o of pain frequently throughout her whole body. Requiring max vc's for upright posture.   Barriers to return to prior living situation: Pt requiring Ax2 for transfers. Significant amt of pain with movement and activity.   Recommendations for discharge: TCU   Rationale for recommendations: Pt would benefit from ongoing skilled OT within a TCU to maximize her Ind before returning to her previous living environment.        Entered by: Roxanne Guerra 12/15/2017 12:43 PM

## 2017-12-15 NOTE — PROGRESS NOTES
"[facility]  Hospitalist Progress Note  Ramo Townsend MD 12/15/2017    Reason for Stay (Diagnosis): Fall, possibly associated with UTI         Assessment and Plan:      Summary of Stay: Chuyita Gayle is a 85 year old female came to attention on 12/13/2017 following a fall. She complained of right wrist and elbow pain and in the ED was found to have evidence of UTI with early Sepsis.    I note that in the ED, the patient was found to have a mildly elevated WBC, but no left shift. In addition, she had LGT and UA is suggestive of infection, though LE is \"small\". Ketones are positive, possibly related to decreased oral intake.     When I met the patient this am, she was repeating \"ow,ow,ow\". She reports that this discomfort has been present for the past 2-3 weeks and she \"hurts all over\". She states she has had some visual disturbance as well, but no specifics are able to be gleaned. RN states to me that they were unable to move the patient this am without intense discomfort.     Ms. Gayle resides at an Assisted Living and reportedly was sent in by staff. Pt has a significant amount of supplemental care at her AL, to the point where, according to her granddaughter, she is virtually in SLC.      Problem List:   1. Severe diffuse pain. ?PMR? Seems much more comfortable today with less generalized pain and more focal complaints of right hand,wrist and shoulder pain.  2. Right distal radius fracture. Right hand pain may be due to undetected fractures, as it seems quite localized over the thumb proximally and at MCP, anatomic snuffbox and third MCP.  3. Right shoulder pain, present from well before the hospitalization.   4. Possible UTI. On Ceftriaxone. (Aerococcus growing on the first day). Sensitivities pending.    PLAN:  1. Cont with Prednisone 20 mg po daily for now.   2. Cont with empiric Ceftriaxone.    DVT Prophylaxis: Pneumatic Compression Devices  Code Status: Full Code  Discharge Dispo: " "TBD  Estimated Disch Date / # of Days until Disch: TBD        Interval History (Subjective):      Hx still difficult to clarify. Pt reports hx of possible fibromyalgia as well as inflammatory arthritis, though she has apparently not seen a Rheumatologist, as far as she can report. I cannot see documentation of these conditions in her chart in CareEverywhere.    Today, her granddaughter came, but I was not called to meet with her. However, I called her and she will return on Sunday.   Pt does indicate that her pain is much improved. RB thought it was improved due to the presence of the granddaughter.   No fevers or sweats or chills.                     Physical Exam:      Last Vital Signs:  /74 (BP Location: Left arm)  Pulse 75  Temp 98.2  F (36.8  C) (Axillary)  Resp 20  Ht 1.626 m (5' 4\")  Wt 65.2 kg (143 lb 11.2 oz)  SpO2 97%  BMI 24.67 kg/m2    I/O last 3 completed shifts:  In: 2656 [P.O.:480; I.V.:2176]  Out: 1075 [Urine:1075]    Constitutional: Awake, alert, comfortable. Ongoing tenderness over the right hand and wrist, but otherwise minimal facial or other muscular discomfort.   Respiratory: Clear to auscultation bilaterally, no crackles or wheezing   Cardiovascular: Regular rate and rhythm, normal S1 and S2, and no murmur noted   Abdomen: Normal bowel sounds, soft, non-distended, non-tender   Skin: No rashes, no cyanosis, dry to touch   Neuro: Alert and appears coherent, oriented to situation.    Extremities: No edema.   Other(s):        All other systems: Negative          Medications:      All current medications were reviewed with changes reflected in problem list.         Data:      All new lab and imaging data was reviewed.   Labs/Imaging:  No results found for this or any previous visit (from the past 24 hour(s)).    "

## 2017-12-15 NOTE — PLAN OF CARE
Problem: Patient Care Overview  Goal: Plan of Care/Patient Progress Review  Outcome: Improving  Pt LS clear on 2L O2. Requiring 0.5 mg dilaudid for adequate pain control, Q 3-4 hours.  Pain down to a 2 with first 0.5 mg dose. Ortho here on eves, will treat conservatively and have OP follow up.  Pt's granddaughter called and hoping for TCU DC.  Sling on Rt arm with ice pack. Mepilex to coccyx intact, foam boots on BLE, skin intact. Hartman with gal urine, some sediment noted. BCx2 negative at 19 hours. NS infusing at 75 ml/hr.

## 2017-12-15 NOTE — PROGRESS NOTES
" 12/15/17 0904   Quick Adds   Type of Visit Initial Occupational Therapy Evaluation   Living Environment   Lives With alone   Living Arrangements apartment   Home Accessibility no concerns   Number of Stairs to Enter Home 0   Number of Stairs Within Home 0   Stair Railings at Home none   Transportation Available none   Living Environment Comment Pt resides alone in an KATE on the independent wing   Self-Care   Dominant Hand right   Usual Activity Tolerance moderate   Current Activity Tolerance poor   Regular Exercise no   Equipment Currently Used at Home walker, rolling   Functional Level Prior   Ambulation 1-->assistive equipment   Transferring 1-->assistive equipment   Toileting 1-->assistive equipment   Bathing 1-->assistive equipment   Dressing 0-->independent   Eating 0-->independent   Communication 0-->understands/communicates without difficulty   Swallowing 0-->swallows foods/liquids without difficulty   Cognition 0 - no cognition issues reported   Fall history within last six months yes   Number of times patient has fallen within last six months 1   Which of the above functional risks had a recent onset or change? ambulation;transferring;toileting;bathing;dressing   Prior Functional Level Comment Pt is a poor historian as she is in a great deal of pain. She [facility] she is independent in ADL's.     General Information   Onset of Illness/Injury or Date of Surgery - Date 12/13/17   Referring Physician Guevara Azevedo MD   Patient/Family Goals Statement To return home   Additional Occupational Profile Info/Pertinent History of Current Problem Per medical record \"Ms. Chuyita Gayle is a very pleasant 85-year-old female with history of atrial fibrillation, who is on Coumadin, and who presented to the emergency room for right arm pain and swelling, and generalized weakness.  EMS, on presentation, reported that the patient lives in an independent section of an assisted living facility, and the " "nurses noticed that her right arm was painful and swollen, along with a worsening gait for the last few days.  She had a fall that was unwitnessed by staff.  On admission, she was found to have a urinary tract infection and was febrile, and was admitted for possible sepsis, and evaluation of her right elbow and shoulder. \"   Precautions/Limitations fall precautions   Weight-Bearing Status - LUE full weight-bearing   Weight-Bearing Status - RUE full weight-bearing   Weight-Bearing Status - LLE full weight-bearing   Weight-Bearing Status - RLE full weight-bearing   General Info Comments Pt in bed upon arrival of OT. Notes pain with movement and touch   Cognitive Status Examination   Orientation person;place   Level of Consciousness alert   Able to Follow Commands WNL/WFL;success, 2-step commands   Personal Safety (Cognitive) decreased awareness, need for assist;decreased awareness, need for safety   Memory impaired   Attention No deficits were identified   Cognitive Comment OT: Pt appeared distracted by her pain, and seemed quite fatigued. Will need to continue to monitor cognition   Visual Perception   Visual Perception Comments Pt reports that she has double vision 'my whole life\"    Sensory Examination   Sensory Comments WNL   Pain Assessment   Patient Currently in Pain Yes, see Vital Sign flowsheet   Integumentary/Edema   Integumentary/Edema Comments none identified   Posture   Posture Comments Head forward kyphotic posture noted   Range of Motion (ROM)   ROM Comment RUE not tested LUE 75 degrees of flexion   Strength   Strength Comments RUE not tested LUE grossly intact   Mobility   Bed Mobility Bed mobility skill: Sit to supine   Bed Mobility Skill: Sit to Supine   Level of [place]: Sit/Supine maximum assist (25% patients effort)   Physical Assist/Nonphysical Assist: Sit/Supine 2 persons;other (see comments)  (use of bed sheet)   Assistive Device: Sit/Supine other (see comments)  (elevated HOB)   Transfer " "Skill: Bed to Chair/Chair to Bed   Level of [place]: Bed to Chair dependent (less than 25% patients effort)   Physical Assist/Nonphysical Assist: Bed to Chair 2 persons   Assistive Device - Transfer Skill Bed to Chair Chair to Bed Rehab Eval other (see comments)  (omar steady)   Transfer Skill: Sit to Stand   Level of [place]: Sit/Stand maximum assist (25% patients effort)   Physical Assist/Nonphysical Assist: Sit/Stand 2 persons   Assistive Device for Transfer: Sit/Stand standard walker   Transfer Skill: Toilet Transfer   Level of [place]: Toilet dependent (less than 25% patients effort)   Physical Assist/Nonphysical Assist: Toilet 2 persons   Assistive Device standard walker   Toilet Transfer Skill Comments Pt stood with FWW, pt only able to take a few steps before fatigue and pain, the commode was placed behind pt.    Toileting   Level of [place]: Toilet maximum assist (25% patients effort)   Physical Assist/Nonphysical Assist: Toilet 1 person assist   Instrumental Activities of Daily Living (IADL)   IADL Comments Pt states she does not leave the [facility], and that she doesn't participate in any certain activities.    Activities of Daily Living Analysis   Impairments Contributing to Impaired Activities of Daily Living balance impaired;cognition impaired;strength decreased   ADL Comments Pt reports that she has her meals made, someone delivers her medicaitons daily.    General Therapy Interventions   Planned Therapy Interventions ADL retraining;cognition;strengthening;transfer training   Clinical Impression   Criteria for Skilled Therapeutic Interventions Met yes, treatment indicated   OT Diagnosis Pt presents with decrased ability to safely manage ADL\"s/IADL's.    Influenced by the following impairments pain, fatigue, decreased strength   Assessment of Occupational Performance 1-3 Performance Deficits   Identified Performance Deficits dressing, bathing, toileting, grooming/hygiene    Clinical " "Decision Making (Complexity) Moderate complexity   Therapy Frequency daily   Predicted Duration of Therapy Intervention (days/wks) 4   Anticipated Discharge Disposition Transitional Care Facility   Risks and Benefits of Treatment have been explained. Yes   Patient, Family & other staff in agreement with plan of care Yes   Clinical Impression Comments Pt is an 85-year-old female who presents following an unwitnessed fall. Pt'  and would benefit from skilled OT interventions in order to improve her safety and independence.   [facility] AM-PAC  \"6 Clicks\" Daily Activity Inpatient Short Form   1. Putting on and taking off regular lower body clothing? 2 - A Lot   2. Bathing (including washing, rinsing, drying)? 2 - A Lot   3. Toileting, which includes using toilet, bedpan or urinal? 2 - A Lot   4. Putting on and taking off regular upper body clothing? 2 - A Lot   5. Taking care of personal grooming such as brushing teeth? 2 - A Lot   6. Eating meals? 3 - A Little   Daily Activity Raw Score (Score out of 24.Lower scores equate to lower levels of function) 13   Total Evaluation Time   Total Evaluation Time (Minutes) 10     "

## 2017-12-16 NOTE — PROGRESS NOTES
12/16/17 1600   Quick Adds   Type of Visit Initial PT Evaluation   Living Environment   Lives With alone   Living Arrangements apartment   Home Accessibility no concerns   Number of Stairs to Enter Home 0   Number of Stairs Within Home 0   Living Environment Comment Pt resides at [facility] in the indep living section   Self-Care   Dominant Hand right   Usual Activity Tolerance moderate   Current Activity Tolerance poor   Regular Exercise no   Equipment Currently Used at Home walker, rolling   Functional Level Prior   Ambulation 1-->assistive equipment   Transferring 1-->assistive equipment   Toileting 1-->assistive equipment   Fall history within last six months yes   Number of times patient has fallen within last six months 1   Which of the above functional risks had a recent onset or change? ambulation;transferring   General Information   Onset of Illness/Injury or Date of Surgery - Date 12/13/17   Referring Physician Dr Waterman   Patient/Family Goals Statement Pt wants to return to her apt but knows she may not be able to right away   Pertinent History of Current Problem (include personal factors and/or comorbidities that impact the POC) Pt presented to ED with generalized weakness and fall.  Pt had acute R arm pain which per ortho is possible mild nondisplaced radial head fx with severe degenerative changes in the right shld.  R arm in sling.  PMHx sig for A-fib, anxiety, HTN, fibromyalgia, asthma, left shld replacement   Precautions/Limitations fall precautions  (R arm in sling)   Weight-Bearing Status - RUE (R UE in sling)   General Observations Pt in bed, agreeable to get up to the chair with the lift   Cognitive Status Examination   Orientation person;place   Level of Consciousness alert   Follows Commands and Answers Questions 75% of the time   Personal Safety and Judgment intact   Memory impaired   Pain Assessment   Patient Currently in Pain Yes, see Vital Sign flowsheet  (c/o pain everywhere)  "  Integumentary/Edema   Integumentary/Edema Comments Noted swelling in R hand   Posture    Posture Forward head position;Protracted shoulders   Range of Motion (ROM)   ROM Comment LE ROM functional   Strength   Strength Comments LE strength with general weakness, anti-gravity strength    Bed Mobility   Bed Mobility Comments Pt needing maxA to roll to either side   Transfer Skills   Transfer Comments Pt transferred bed to chair via lift.  OT attempted standing and use of omar steady and was very difficult with maxA x2   Gait   Gait Comments Unable   General Therapy Interventions   Planned Therapy Interventions gait training;strengthening;transfer training   Clinical Impression   Criteria for Skilled Therapeutic Intervention yes, treatment indicated   PT Diagnosis limited mobility skills   Influenced by the following impairments pain, weakness, arm in sling,   Functional limitations due to impairments transfers, gait, bed mobility   Clinical Presentation Stable/Uncomplicated   Clinical Presentation Rationale PT eval, chart review   Clinical Decision Making (Complexity) Low complexity   Therapy Frequency` daily   Predicted Duration of Therapy Intervention (days/wks) 4 days   Anticipated Equipment Needs at Discharge (possibly platform walker or WBQC)   Anticipated Discharge Disposition Transitional Care Facility   Risk & Benefits of therapy have been explained Yes   Patient, Family & other staff in agreement with plan of care Yes   [facility] TM \"6 Clicks\"   2016, Trustees of [facility], under license to "[employer].  All rights reserved.   6 Clicks Short Forms Basic Mobility Inpatient Short Form   [facility] AM-PAC  \"6 Clicks\" V.2 Basic Mobility Inpatient Short Form   1. Turning from your back to your side while in a flat bed without using bedrails? 2 - A Lot   2. Moving from lying on your back to sitting on the side of a flat bed without using bedrails? 2 - A Lot   3. Moving to and from a " bed to a chair (including a wheelchair)? 1 - Total   4. Standing up from a chair using your arms (e.g., wheelchair, or bedside chair)? 2 - A Lot   5. To walk in hospital room? 1 - Total   6. Climbing 3-5 steps with a railing? 1 - Total   Basic Mobility Raw Score (Score out of 24.Lower scores equate to lower levels of function) 9   Total Evaluation Time   Total Evaluation Time (Minutes) 15

## 2017-12-16 NOTE — PLAN OF CARE
RN- Pt moved to room 300 for use of lift equipment. Granddaughter  called and notified of room change.

## 2017-12-16 NOTE — PLAN OF CARE
Problem: Patient Care Overview  Goal: Plan of Care/Patient Progress Review  Outcome: No Change  VS stable. 96% on 2L 02. Diminished LS. Complained of pain in right arm and pain med's given. Slept well throughout the night.

## 2017-12-16 NOTE — PROGRESS NOTES
"[facility]  Hospitalist Progress Note  Ramo Townsend MD 12/16/2017    Reason for Stay (Diagnosis): Fall, possibly associated with UTI         Assessment and Plan:      Summary of Stay: Chuyita Gayle is a 85 year old female came to attention on 12/13/2017 following a fall. She complained of right wrist and elbow pain and in the ED was found to have evidence of UTI with early Sepsis.    I note that in the ED, the patient was found to have a mildly elevated WBC, but no left shift. In addition, she had LGT and UA is suggestive of infection, though LE is \"small\". Ketones are positive, possibly related to decreased oral intake.     Ms. Gayle resides at an Assisted Living and reportedly was sent in by staff. Pt has a significant amount of supplemental care at her AL, to the point where, according to her granddaughter, she is virtually in [facility].    Initially, when I met the patient on 12/14, she was notably in quite intense pain, constantly crying out \"ow, ow, ow\". She was found to be tender over her face as well as diffusely over her muscles. She tells me that she has a history of \"inflammatory arthritis\" and it certainly appears that her left hand has been quite hotly inflamed. She was empirically started on steroids (initially solumedrol 1 mg/kg once IV, then Prednisone 20 mg po daily for the next two days). Inflammatory markers were sent off and were notably extremely high.         Problem List:   1. Severe diffuse pain. ?PMR? Seems much more comfortable today with less generalized pain and more focal complaints of right hand,wrist and shoulder pain.  2. Right distal radius fracture. Right hand pain may be due to undetected fractures, as it seems quite localized over the thumb proximally and at MCP, anatomic snuffbox and third MCP. CT scan of the right hand completed today shows no new or additional fractures.   3. Right shoulder pain, present from well before the hospitalization.   4. Possible UTI. On " "Ceftriaxone. (Aerococcus growing on the first day). Pan-sensitive (including ceftriaxone). Ceftriaxone had been started on admission, and she is now on day 4 of that treatment.  5. Supratherapeutic INR. Recheck (after 2.5 mg po Vitamin K) is 2.77).  6. Atrial fibrillation for which she is anticoagulated. With her tendency to fall, it is not clear to me that she should remain on anticoagulation.    PLAN:  1. Cont with Prednisone 20 mg po daily for now.   2. Cont with empiric Ceftriaxone x 5 days.  3. Was started on Scheduled MSContin as of 12/15 evening and has continued to ask for Oxycodone throughout the day.    DVT Prophylaxis: Pneumatic Compression Devices  Code Status: Full Code  Discharge Dispo: TBD  Estimated Disch Date / # of Days until Disch: TBD        Interval History (Subjective):      Pt much more interactive today. Awaiting return of granddaughters from [place] tomorrow.     Pt started sobbing when she talked about her , who  (?)recently.   She reportedly is quite constipated. No N/V. Appetite is improving. No SOB or cough.  Is having significant difficulty standing. She states that this is not unusual for her, she sometimes is able to stand, but often needs a lift.   Reports that she gets a lot of assistance at [facility], where she lives.                     Physical Exam:      Last Vital Signs:  /67 (BP Location: Left arm)  Pulse 75  Temp 96.1  F (35.6  C) (Oral)  Resp 18  Ht 1.626 m (5' 4\")  Wt 65.2 kg (143 lb 11.2 oz)  SpO2 97%  BMI 24.67 kg/m2    I/O last 3 completed shifts:  In: 2850 [P.O.:1330; I.V.:1520]  Out: 1150 [Urine:1150]    Constitutional: Awake, alert, comfortable. Ongoing tenderness over the right hand and wrist, but otherwise minimal facial or other muscular tenderness.   Respiratory: Clear to auscultation bilaterally, no crackles or wheezing   Cardiovascular: Regular rate and rhythm, normal S1 and S2, and no murmur noted   Abdomen: Normal bowel sounds, soft, " non-distended, non-tender   Skin: No rashes, no cyanosis, dry to touch   Neuro: Alert and appears coherent, oriented to situation.    Extremities: Diffusely edematous.   Other(s):        All other systems: Negative          Medications:      All current medications were reviewed with changes reflected in problem list.         Data:      All new lab and imaging data was reviewed.   Labs/Imaging:  Results for orders placed or performed during the hospital encounter of 12/13/17 (from the past 24 hour(s))   INR   Result Value Ref Range    INR 2.77 (H) 0.86 - 1.14   Basic metabolic panel   Result Value Ref Range    Sodium 135 133 - 144 mmol/L    Potassium 3.9 3.4 - 5.3 mmol/L    Chloride 105 94 - 109 mmol/L    Carbon Dioxide 22 20 - 32 mmol/L    Anion Gap 8 3 - 14 mmol/L    Glucose 114 (H) 70 - 99 mg/dL    Urea Nitrogen 17 7 - 30 mg/dL    Creatinine 0.55 0.52 - 1.04 mg/dL    GFR Estimate >90 >60 mL/min/1.7m2    GFR Estimate If Black >90 >60 mL/min/1.7m2    Calcium 8.7 8.5 - 10.1 mg/dL   INR   Result Value Ref Range    INR 4.25 (H) 0.86 - 1.14

## 2017-12-16 NOTE — PLAN OF CARE
Problem: Patient Care Overview  Goal: Plan of Care/Patient Progress Review  OT: pt. Working with another provider.  Will cancel and reschedule per POC.

## 2017-12-16 NOTE — PLAN OF CARE
RN:  VS:   Pain: Very tearful with a lot of pain in first part of shift. Pain meds increased with scheduled long acting and pain is now a 5/10.   Cognition: Alert and oriented, forgetful at times.   Ambulatory Status: Lift. RUE in sling.   Resp: Started on 2LNC as pt is getting increased opiates.   CV:  GI: Sat on BSC and was unable to have BM.   : Hartman intact  Skin: Mepilex drsg intact to coccyx.  Labs:   Plan: No clear DC plan at this time.

## 2017-12-16 NOTE — PLAN OF CARE
Problem: Patient Care Overview  Goal: Plan of Care/Patient Progress Review  PT eval completed and treatment initiated.  Pt at baseline lives alone in apt in indep section of halfway.  Pt currently needing lift to transfer bed to chair and sig pain control issues.     Discharge Planner PT   Patient plan for discharge: Realizes she may need TCU prior to returning to her apt  Current status: Pt needing maxA with bed mobility, needing ceiling lift and assist x2 to transfer bed to chair, right arm in sling  Barriers to return to prior living situation: Level of care pt needs for basic mobility, lives alone in apt  Recommendations for discharge: TCU  Rationale for recommendations: Pt will need skilled PT during hosp stay and at TCU for greatest chance of regaining mobility skills to return to baseline of indep living       Entered by: Pura Peterson 12/16/2017 4:38 PM

## 2017-12-17 NOTE — PLAN OF CARE
Problem: Patient Care Overview  Goal: Plan of Care/Patient Progress Review  Outcome: No Change  VS stable. Heart rate elevated at times. Diminished LS. Complained of pain in right arm and pain med's given. Slept well throughout the night.

## 2017-12-17 NOTE — PLAN OF CARE
Problem: Patient Care Overview  Goal: Plan of Care/Patient Progress Review  Discharge Planner OT   Patient plan for discharge: home versus TCU  Current status: OT: provided verbal cues to cue and encourage movement due to pain increased pain sensitivity and swelling.  OT provided min A to mod A with rolling in bed to place sling.  Liko sling from bed to commode and commode to chair.  OT provided verbal cues to move B UE to assist with transfers and positioning.  OT provided verbal cues to assist with open/close hands and position in chair to assist with functional reaching and decreased pain.  Pt. able to copy movements slowly and note that she is feeling more upright and with less pain.  Pt. positioned at end for self feeding task.  Barriers to return to prior living situation: Pt requiring Ax2 for transfers. Significant amt of pain with movement and activity.   Recommendations for discharge: TCU   Rationale for recommendations: Pt would benefit from ongoing skilled OT within a TCU to maximize her Ind before returning to her previous living environment.       Entered by: Devi Mancuso 12/17/2017 8:56 AM

## 2017-12-17 NOTE — PLAN OF CARE
Problem: Patient Care Overview  Goal: Plan of Care/Patient Progress Review  Discharge Planner PT   Patient plan for discharge: home vs TCU, agreeable to TCU  Current status: Pt requiring maxA x 1-2 for all mobility including bed mobility and transfers.  Barriers to return to prior living situation: Pt requiring Ax2 for transfers and experiencing increased pain  Recommendations for discharge: TCU  Rationale for recommendations: Pt is below baseline function and requiring heavy assist for all mobility.       Entered by: Lc Medina 12/17/2017 10:51 AM

## 2017-12-17 NOTE — PROGRESS NOTES
"[facility]  Hospitalist Progress Note  Ramo Townsend MD 12/17/2017    Reason for Stay (Diagnosis): Fall, possibly associated with UTI         Assessment and Plan:      Summary of Stay: Chuyita Gayle is a 85 year old female came to attention on 12/13/2017 following a fall. She complained of right wrist and elbow pain and in the ED was found to have evidence of UTI with early Sepsis.    I note that in the ED, the patient was found to have a mildly elevated WBC, but no left shift. In addition, she had LGT and UA is suggestive of infection, though LE is \"small\". Ketones are positive, possibly related to decreased oral intake.     Ms. Gayle resides at an Assisted Living and reportedly was sent in by staff. Pt has a significant amount of supplemental care at her AL, to the point where, according to her granddaughter, she is virtually in [facility].    Initially, when I met the patient on 12/14, she was notably in quite intense pain, constantly crying out \"ow, ow, ow\". She was found to be tender over her face as well as diffusely over her muscles. She tells me that she has a history of \"inflammatory arthritis\" and it certainly appears that her left hand has been quite hotly inflamed. She was empirically started on steroids (initially solumedrol 1 mg/kg once IV, then Prednisone 20 mg po daily for the next two days). Inflammatory markers were sent off and were notably extremely high.         Problem List:   1. Severe diffuse pain. Suspect PMR. Seems much more comfortable today with much less generalized pain and though still with focal complaints of right hand, wrist and shoulder pain.  2. Right distal radius fracture. CT scan of the right hand completed 12/16 shows no new or additional fractures.   3. Right shoulder pain, present from well before the hospitalization.   4. Possible UTI. On Ceftriaxone. (Aerococcus growing on the first day), pan-sensitive (including ceftriaxone). Ceftriaxone had been started on " admission, and tonight should be the last dose.  5. Supratherapeutic INR. Recheck (after 2.5 mg po Vitamin K) is 2.77). Should decide about d/c of Warfarin prior to discharge.   6. Atrial fibrillation for which she is anticoagulated. With her tendency to fall, I have recommended discontinuation of Warfarin. This will need further discussion to include family.  7. Clarified Code status with patient, who appears coherent. She has previously asked to be Full code, but today, with adequate explanation is willing to accept DNR/DNI.     PLAN:  1. Cont with Prednisone 15 mg po daily for now.   2. Cont with empiric Ceftriaxone x 5 days. (Tonight is the last dose).  3. Was started on Scheduled MSContin as of 12/15 evening and has continued to ask for Oxycodone throughout the day. Her pain appears very well-controlled at this time without excessive sedation.   4. Palliative care consult to help family to make plans. PT and OT following.     DVT Prophylaxis: Pneumatic Compression Devices  Code Status: DNR/DNI  Discharge Dispo: pt reportedly needs more services than can be comfortably offered at [facility]. I believe that she needs to look for a new place. Possible dispo to TCU, but ultimately will need to go to SNF.  Estimated Disch Date / # of Days until Disch: in the next couple of days, when suitable place is identified.         Interval History (Subjective):      Pt continues to appear more alert, comfortable and interactive day by day.   Eating better. RN reports pt is a full two person assist and even then requires heavy assistance.   No SOB or cough. No N/V. BM last night.     I also spoke with pt's granddaughter  at bedside and Stacey by phone. (Stacey is the most involved family member, and is currently travelling, with plan to return on Thursday.) She reports that pt has been in significant pain for months now. She also has not been independent in months, requiring a lot of assistance.                   Physical  "Exam:      Last Vital Signs:  /71 (BP Location: Left arm)  Pulse 75  Temp 97.5  F (36.4  C) (Axillary)  Resp 18  Ht 1.626 m (5' 4\")  Wt 65.2 kg (143 lb 11.2 oz)  SpO2 91%  BMI 24.67 kg/m2    I/O last 3 completed shifts:  In: 2786 [P.O.:2160; I.V.:626]  Out: 1700 [Urine:1700]    Constitutional: Awake, alert, comfortable. Ongoing tenderness over the right hand and wrist, but otherwise minimal facial or other muscular tenderness.   Respiratory: Clear to auscultation bilaterally, no crackles or wheezing   Cardiovascular: Regular rate and rhythm, normal S1 and S2, and no murmur noted   Abdomen: Normal bowel sounds, soft, non-distended, non-tender   Skin: No rashes, no cyanosis, dry to touch   Neuro: Alert and appears coherent, oriented to situation.    Extremities: Right hand is quite edematous with erythema over the third MCP. Bilat LE edema.   Other(s):        All other systems: Negative          Medications:      All current medications were reviewed with changes reflected in problem list.         Data:      All new lab and imaging data was reviewed.   Labs/Imaging:  Results for orders placed or performed during the hospital encounter of 12/13/17 (from the past 24 hour(s))   Lactic acid level STAT   Result Value Ref Range    Lactic Acid 1.5 0.7 - 2.0 mmol/L   INR   Result Value Ref Range    INR 1.28 (H) 0.86 - 1.14       "

## 2017-12-17 NOTE — PLAN OF CARE
Patient had formed BM today. Attempted a pivot transfer with assist of 2. This was a max assist and patients knees  buckled. Lift was used to return patient back to chair. More alert today with better pain control. Code status changed to DNR/DNI. HR . Hartman urine output 800 this shift.

## 2017-12-18 NOTE — PLAN OF CARE
Problem: Patient Care Overview  Goal: Plan of Care/Patient Progress Review  Outcome: No Change  VS stable. Diminished LS. + 3 edema to right hand. Forgetful. Complained of pain in right arm and left hip and pain med's given. Slept on and off throughout the night.

## 2017-12-18 NOTE — PLAN OF CARE
Problem: Patient Care Overview  Goal: Plan of Care/Patient Progress Review  Discharge Planner PT   Patient plan for discharge: home vs TCU, agreeable to TCU  Current status: Pt requiring mod/max A x 1 for bed mobility, pt needing max cues and encouragement to assist herself. Dependent to scoot to EOB. Max A x 2 to stand at Maria steady to completed bed to chair transfer, needing 2 attempts to stand upright enough to use SS. Pt c/o of back pain throughout session, c/o of SOB upon sitting in chair, SaO2 96%, /85  Barriers to return to prior living situation: Pt requiring Ax2 for transfers and experiencing increased pain  Recommendations for discharge: TCU  Rationale for recommendations: Pt is below baseline function and requiring heavy assist for all mobility.       Entered by: Kenzie Mcgowan 12/18/2017 2:28 PM

## 2017-12-18 NOTE — CONSULTS
[facility]    Palliative Care Consultation   Text Page    Date of Admission:  12/13/2017    Consult noted and appreciated, met briefly with patient and plan to complete formal consult tomorrow. She will let me know if any of her granddaughters can be available by phone.    Maggie MCCABE, CNS  Pain Management and Palliative Care  [facility]  Pgr: [phone]

## 2017-12-18 NOTE — PROGRESS NOTES
"[facility]  Hospitalist Progress Note  Edgar Braga MD 12/18/2017    Reason for Stay (Diagnosis): Fall, possibly associated with UTI         Assessment and Plan:      Summary of Stay: Chuyita Gayle is a 85 year old female who presented on 12/13/2017 following a fall. She complained of right wrist and elbow pain and in the ED was found to have evidence of UTI with early Sepsis.  I note that in the ED, the patient was found to have a mildly elevated WBC, but no left shift. In addition, she had LGT and UA is suggestive of infection, though LE is \"small\". Ketones are positive, possibly related to decreased oral intake.     Ms. Gayle resides at an Assisted Living and reportedly was sent in by staff. Pt has a significant amount of supplemental care at her AL, to the point where, according to her granddaughter, she is virtually in [facility].    Based on markedly elevated inflammatory markers and fairly diffuse MSK pain with report of prior hx of  \"inflammatory arthritis\" she was empirically started on steroids for suspected PMR (initially solumedrol 1 mg/kg once IV, then Prednisone 20 mg po daily w/ taper). Inflammatory markers were sent off and were notably extremely high.     The plan is to gradually wean down on narcotics (has been on MS contin and prn oxycodone), work on mobilization and discharge her to a supported living environment in the coming days w/ gradual prednisone wean and PCP vs rheum follow up.        Problem List:   1. Severe diffuse pain. Suspect PMR. Apparently much more comfortable with much less generalized pain and though still with focal complaints of right hand, wrist and shoulder pain.  ?component from falls as well.  --continue prednisone 15 mg daily for now, ?remain at this dose vs slowly taper pending close follow up plan.  --Palliative care consult to help family to make plans. PT and OT following.   --recheck inflammatory markers in the AM    2. Right distal radius " "fracture. CT scan of the right hand completed 12/16 shows no new or additional fractures.     3. Right shoulder pain, present from well before the hospitalization.     4. Possible UTI. On Ceftriaxone. (Aerococcus growing on the first day), pan-sensitive (including ceftriaxone). Ceftriaxone had been started on admission, and she has completed the last dose.    5. Supratherapeutic INR on admission. Recheck (after 2.5 mg po Vitamin K) is 2.77). Should decide about d/c of Warfarin prior to discharge given fall risk.    6. Atrial fibrillation for which she is anticoagulated. With her tendency to fall, tentatively plan to discontinuation of Warfarin. This will need further discussion to include family.    DVT Prophylaxis: Pneumatic Compression Devices  Code Status: DNR/DNI  Discharge Dispo: pt reportedly needs more services than can be comfortably offered at [facility]. I believe that she needs to look for a new place. Possible dispo to TCU, but ultimately will need to go to SNF.  Estimated Disch Date / # of Days until Disch: in the next couple of days, when suitable place is identified.         Interval History (Subjective):      Pt continues to appear more alert, comfortable and interactive day by day though very weak and requiring lift for transfers  Awake and alert, pain controlled this AM                  Physical Exam:      Last Vital Signs:  /61 (BP Location: Left arm)  Pulse 75  Temp 96.8  F (36  C) (Axillary)  Resp 16  Ht 1.626 m (5' 4\")  Wt 65.2 kg (143 lb 11.2 oz)  SpO2 93%  BMI 24.67 kg/m2    I/O last 3 completed shifts:  In: 1310 [P.O.:1310]  Out: 1800 [Urine:1800]    Constitutional: Awake, alert, comfortable. Ongoing tenderness over the right hand and wrist, but otherwise minimal facial or other muscular tenderness.   Respiratory: Clear to auscultation bilaterally, no crackles or wheezing   Cardiovascular: Regular rate and rhythm, normal S1 and S2, and no murmur noted   Abdomen: Normal bowel sounds, " soft, non-distended, non-tender   Skin: No rashes, no cyanosis, dry to touch   Neuro: Alert and appears coherent, oriented to situation.    Extremities: Right hand is quite edematous with erythema over the third MCP. Bilat LE edema.   Other(s):        All other systems: Negative          Medications:      All current medications were reviewed with changes reflected in problem list.         Data:      All new lab and imaging data was reviewed.   Labs/Imaging:  Results for orders placed or performed during the hospital encounter of 12/13/17 (from the past 24 hour(s))   Lactic acid level STAT   Result Value Ref Range    Lactic Acid 1.0 0.7 - 2.0 mmol/L

## 2017-12-18 NOTE — PLAN OF CARE
Problem: Fracture Orthopaedic (Adult)  Goal: Signs and Symptoms of Listed Potential Problems Will be Absent, Minimized or Managed (Fracture Orthopaedic)  Signs and symptoms of listed potential problems will be absent, minimized or managed by discharge/transition of care (reference Fracture Orthopaedic (Adult) CPG).   Outcome: Improving  VSS. Up with lift to chair. Right arm in sling. Hartman in place with good urine output. Scheduled MS contin for right arm pain, prednisone daily. Continues on IV Rocephin for UTI.

## 2017-12-19 NOTE — CONSULTS
Physical Therapy recommends TCU. FAITH visited patient in room. She is sleeping. Contacted her granddaughter, Carolina Boothe ([phone]). She is currently at [facility] but wants to be kept up to date on situation. She cares for her grandmother. She would like referrals sent to [facility] and [facility] for a shared room. She will be back in [place] on Thursday. Sent referrals.     FAITH will continue to follow patient until discharge for any additional needs.     Sully Izquierdo RN, BSN, CTS  [facility]  [phone]    Addendum 11:07am - Received call from Aleida at [facility]. They want to know dc plan before accepting patient. Aleida also wanted to know more about her skin condition. Patient has pressure injury to bilateral heels requiring heel boots. Skin is red & blanchable. Also has healing coccyx injury. Covered in mepilex. Wound base is blanchable erythema with periwound skin intact. Repeated this information to Aleida. Estimate patient's improvement fair to good. FAITH will keep [facility] updated on patient's dc plan.               makeda

## 2017-12-19 NOTE — CONSULTS
[facility]    Palliative Care Consultation   Text Page    Date of Admission:  12/13/2017    Assessment & Plan   Chuyita Gayle is a 85 year old female who was admitted on 12/13/2017. I was asked by Hospitalist Ramo Townsend MD to see this elderly, disable woman her with ?PMR.    Recommendations:  1.  Confusion - patient does not demonstrate medical capacity     2.  Pain - Reasonable to try Voltaren topically for right hand pain     3.  Goals of Care: DNR/DNI - Restorative care.    Disease Process/es & Symptoms:  Chuyita Gayle is a 85 year old patient admitted 12/13/2017 following a fall and was found to have a right radial avulsion which was place in a sling. She was noted to be febrile and was admitted for concern of early sepsis. The patient has a history of chronic pain syndrome, a-fib on coumadin, anxiety, fibromyalgia, HTN, and asthma.    The following symptoms are noted by patient as concerning to her quality of life.  Pain - right hand pain  Anxiety - tearful     Psychosocial/Spiritual Needs:  Consultation placed for  to follow. Consultation placed for  to follow.    Decision-Making & Goals of Care:  Discussion/counseling today about goals of care/decisions:   Met with Chuyita as she was resting in bed. She was tearful and unable to give much insight into her recent health. She was able to explain that her right hand has been painful, so it seems reasonable to try topical Voltaren. I would be mindful to avoid further escalation of opiates given the patient's limited insight. I did leave a message for her granddaughter/healthcare agent Stacey Boothe at [phone] to call our offices at her earliest convenience.      Patient has decision-making capacity Questionable  Patient has a Health Care Agent named in a legal Advance Directive document dated 3/23/2015. See name(s) and contact information in Health Care Agent/Legal Guardian section below.  Name: Stacey Boothe  Relationship: granddaughter, Phone(s): [phone].  First Alternate Name: Taylor Carty, Relationship: granddaughter, Phone(s): [phone].    Patient has a completed health care directive available in the chart (Y/N): Yes  Physician orders for life-sustaining treatment (POLST) form is not completed  Code Status: Do not resuscitate / Do not intubate     Findings & plan of care discussed with: bedside nurse Arlin Strauss RN  Follow-up plan from palliative team: Will follow closely during this hospitalization  Thank you for involving us in the patient's care.     Vandana Hagen MS, RN, CNS, APRN, ACHPN, FAACVPR  Pain and Palliative Care  Pager [phone]  Office [phone]       Time Spent on this Encounter   I spent  30 minutes in assessment of the patient and discussion with the patient and family. Another 35 minutes in review of chart, documentation and discussion with the health care team.    Reason for Consult   Reason for consult: I was asked by Hospitalist Ramo Townsend MD to see this elderly, disable woman her with ?PMR.    Primary Care Physician   Sen Flores    Chief Complaint   Arm Pain and Generalized Weakness    History is obtained from the patient and electronic health record      Past Medical History   I have reviewed this patient's medical history and updated it with pertinent information if needed.   Past Medical History:   Diagnosis Date     A-fib (H)      Anxiety      Fibromyalgia      Hypertension      Uncomplicated asthma        Past Surgical History   I have reviewed this patient's surgical history and updated it with pertinent information if needed.  Past Surgical History:   Procedure Laterality Date     BACK SURGERY       CHOLECYSTECTOMY       GI SURGERY       ORTHOPEDIC SURGERY         Prior to Admission Medications   Prior to Admission Medications   Prescriptions Last Dose Informant Patient Reported? Taking?   Albuterol (VENTOLIN IN)   Yes Yes   Fish Oil-Cholecalciferol (FISH  OIL + D3 PO)   Yes Yes   GABAPENTIN PO 2017 at Unknown time  Yes Yes   Sig: Take 600 mg by mouth At Bedtime    HYDROcodone-acetaminophen (NORCO) 5-325 MG per tablet 2017 at Unknown time  Yes Yes   Sig: Take 1 tablet by mouth At Bedtime   HYDROcodone-acetaminophen (NORCO) 5-325 MG per tablet   Yes Yes   Sig: Take 1 tablet by mouth every 6 hours as needed for moderate to severe pain   Menthol-Zinc Oxide (CALMOSEPTINE EX) 2017 at x1  Yes Yes   Sig: Externally apply topically 2 times daily    Misc Natural Products (OSTEO BI-FLEX ADV DOUBLE ST PO) 2017 at x1  Yes Yes   Sig: Take 1 tablet by mouth 2 times daily    OMEPRAZOLE PO 2017 at Unknown time  Yes Yes   Sig: Take 20 mg by mouth every morning    Omega-3 Fatty Acids (FISH OIL) 1200 MG capsule 2017 at Unknown time  Yes Yes   Sig: Take 1,200 mg by mouth daily   TRAMADOL HCL PO 2017 at x2  Yes Yes   Sig: Take 25 mg by mouth 3 times daily    VITAMIN D, CHOLECALCIFEROL, PO 2017 at Unknown time  Yes Yes   Sig: Take 1,000 Units by mouth daily    Warfarin Sodium (JANTOVEN PO) 2017 at Unknown time  Yes Yes   Si.5 mg on e, Wed, Fri, Sat, Sun   albuterol (PROAIR HFA/PROVENTIL HFA/VENTOLIN HFA) 108 (90 BASE) MCG/ACT Inhaler   Yes Yes   Sig: Inhale 2 puffs into the lungs 2 times daily as needed for shortness of breath / dyspnea or wheezing   atenolol (TENORMIN) 25 MG tablet 2017 at x1  Yes Yes   Sig: Take 50 mg by mouth 2 times daily    cetirizine (ZYRTEC) 10 MG tablet 2017 at Unknown time  Yes Yes   Sig: Take 10 mg by mouth daily   fluticasone (FLONASE) 50 MCG/ACT spray 2017 at Unknown time  Yes Yes   Sig: Spray 2 sprays into both nostrils daily   fluticasone-vilanterol (BREO ELLIPTA) 100-25 MCG/INH oral inhaler 2017 at Unknown time  Yes Yes   Sig: Inhale 1 puff into the lungs daily   latanoprost (XALATAN) 0.005 % ophthalmic solution 2017 at Unknown time  Yes Yes   Sig: Place 1 drop into  both eyes At Bedtime    menthol-zinc oxide (CALMOSEPTINE) 0.44-20.625 % OINT ointment   Yes Yes   Sig: Apply topically every hour as needed for skin protection   multivitamin, therapeutic with minerals (MULTI-VITAMIN) TABS tablet 2017 at Unknown time  Yes Yes   Sig: Take 1 tablet by mouth daily   pramox-pe-glycerin-petrolatum (PREPARATION H) 1-0.25-14.4-15 % CREA cream   Yes Yes   Sig: Place rectally as needed for hemorrhoids   senna-docusate (SENOKOT-S;PERICOLACE) 8.6-50 MG per tablet 2017 at Unknown time  Yes Yes   Sig: Take 1 tablet by mouth daily   senna-docusate (SENOKOT-S;PERICOLACE) 8.6-50 MG per tablet   Yes Yes   Sig: Take 1 tablet by mouth daily as needed for constipation   trolamine salicylate (ASPERCREME) 10 % cream 2017 at am  Yes Yes   Sig: Apply topically 2 times daily      Facility-Administered Medications: None     Allergies   Allergies   Allergen Reactions     Nabumetone Rash     Sulfa Drugs Rash     Vioxx [Rofecoxib] Rash     Celecoxib      Nabumetone      Sulfa Drugs      Vioxx [Rofecoxib]      Celebrex [Celecoxib] Rash       Social History   Living situation: Has resided at [facility] for past 2 years  Family system: Grandchildren are supportive. (She had 4 sons, three are  and the fourth has been estranged for many years)    Functional status: dependent for all ADLs  History of substance use/abuse:  reports that she has never smoked. She does not have any smokeless tobacco history on file.  reports that she does not drink alcohol.  [employer] affiliation: Hinduism    Family History   I have reviewed this patient's family history and updated it with pertinent information if needed.   No family history on file.    Review of Systems   The 10 point Review of Systems is negative other than noted in the HPI or here.     Palliative Symptom Review (0=no symptom/no concern, 1=mild, 2=moderate, 3=severe):      Pain: 2-moderate      Fatigue: 2-moderate      Nausea:  0-none      Constipation: 0-none      Diarrhea: 0-none      Depressive Symptoms: 3-severe      Anxiety: 2-moderate      Drowsiness: 0-none      Poor Appetite: 0-none      Shortness of Breath: 0-none      Insomnia: 0-none       Physical Exam   Temp:  [95.9  F (35.5  C)-97.9  F (36.6  C)] 95.9  F (35.5  C)  Heart Rate:  [] 108  Resp:  [16-18] 18  BP: (112-157)/(66-99) 136/92  SpO2:  [92 %-97 %] 97 %  143 lbs 11.2 oz  GEN:  Alert, tearful and oriented to self only, appears uncomfortable, but indicates she is distressed from loneliness.  HEENT:  Normocephalic/atraumatic, no scleral icterus, no nasal discharge, mouth moist.  CV:  RRR, S1, S2; no murmurs or other irregularities noted.  +3 DP/PT pulses bilaterally; right had is swollen.   RESP:  Clear to auscultation bilaterally without rales/rhonchi/wheezing/retractions.  Symmetric chest rise on inhalation noted.  Normal respiratory effort.  ABD:  Rounded, soft, non-tender/non-distended.  +BS  EXT:  Edema & pulses as noted above.  CMS intact x 4.     M/S:   Right hand and arm are tender to palpation.    SKIN:  Warm and dry to touch, no exanthems noted in the visualized areas.    NEURO: Unwilling or unable to follow commands, but moves all 4 extremities  Psych:  Tearful, anxious, distracted, and confused.        Data   Results for orders placed or performed during the hospital encounter of 12/13/17   CT Head w/o Contrast    Narrative    CT HEAD WITHOUT CONTRAST  12/13/2017 7:08 PM    HISTORY: Fall, altered mental status.      TECHNIQUE: Scans were obtained through the head without IV contrast.  Radiation dose for this scan was reduced using automated exposure  control, adjustment of the mA and/or kV according to patient size, or  iterative reconstruction technique.    COMPARISON: 2/19/2017     FINDINGS: Moderate atrophy. 1 cm area of old infarction right frontal  periventricular region extending into the right basal ganglia.  Moderate chronic white matter disease  likely small vessel ischemic  change. No hemorrhage, mass lesion, or focal area of acute infarction  identified. Paranasal sinuses are normal. No bony abnormality. No  change since prior head CT.      Impression    IMPRESSION:   1. Atrophy and chronic white matter disease.  2. 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  3. No interval change.    KIM DALE MD   Shoulder XR, G/E 3 views, right    Narrative    SHOULDER RIGHT THREE OR MORE VIEWS   12/13/2017 7:30 PM     HISTORY: Arm pain.     COMPARISON: None.    FINDINGS: Advanced chronic degenerative changes right shoulder.  Associated hypertrophic changes and cystic degenerative changes within  the subchondral region of the humeral head. No fracture or acute  appearing abnormality. Degenerative changes right AC joint with slight  widening of the AC joint probably chronic.      Impression    IMPRESSION: Advanced degenerative changes right shoulder and mild  degenerative changes right AC joint. Nothing acute.    KIM DALE MD   Elbow XR, G/E 3 views, right    Narrative    ELBOW RIGHT THREE OR MORE VIEWS   12/13/2017 7:30 PM     HISTORY: Fall, pain.     COMPARISON: None.    FINDINGS: Slight avulsion of the superior aspect of the radial head on  the lateral view is suspected. Exam otherwise negative.      Impression    IMPRESSION: Probable tiny avulsion of the radial head.    KIM DALE MD   Wrist XR, G/E 3 views, right    Narrative    WRIST RIGHT THREE OR MORE VIEWS   12/13/2017 7:30 PM     HISTORY: Fall, pain.     COMPARISON: None.    FINDINGS: Chondrocalcinosis right wrist. Advanced degenerative changes  of the carpometacarpal articulation of the radial aspect of the right  wrist. Nothing acute.      Impression    IMPRESSION: Degenerative changes right wrist. Nothing acute. No  fracture identified.    KIM DALE MD   XR Chest 1 View    Narrative    CHEST ONE VIEW   12/13/2017 7:30 PM     HISTORY: Weakness.     COMPARISON:  None.    FINDINGS: Degenerative changes right shoulder and prior left shoulder  arthroplasty. Heart and lungs negative.      Impression    IMPRESSION: Nothing acute. No infiltrates.    KIM DALE MD   Thoracic spine XR, 3 views    Narrative    THORACIC SPINE THREE VIEW   12/13/2017 7:30 PM     HISTORY: Fall, pain.     COMPARISON: None.    FINDINGS: Minimal hypertrophic changes thoracic spine. Generalized  mild narrowing of all of the thoracic disc spaces on a degenerative  basis. No fracture or acute appearing abnormality.      Impression    IMPRESSION: Degenerative changes throughout the thoracic spine but  nothing clearly acute.    KIM DALE MD   Lumbar spine XR, 2-3 views    Narrative    LUMBAR SPINE TWO - THREE VIEWS   12/13/2017 7:30 PM     HISTORY: Fall, pain;.     COMPARISON: None.    FINDINGS: Generalized narrowing of all lumbar interspaces with vacuum  sign of degenerative disc disease at each level. Grade I  spondylolisthesis of L4 on L5 probably related to facet joint disease  in the mid and lower lumbar spine. No fracture or acute abnormality.  Surgical clips right upper quadrant. Mild lumbar curve concave to the  left.      Impression    IMPRESSION: Advanced generalized degenerative disease of the lumbar  spine without acute appearing abnormality. No fracture.    KIM DALE MD   CT Hand Right w/o Contrast    Narrative    CT RIGHT HAND WITHOUT CONTRAST  12/16/2017 9:22 AM     HISTORY:  Pain in anatomic snuffbox, right thumb base and at thenar  MCP as well as intense pain and swelling at third MCP. Status post  fall.      TECHNIQUE: Axial images with reconstructions. Radiation dose for this  scan was reduced using automated exposure control, adjustment of the  mA and/or kV according to patient size, or iterative reconstruction  technique.     FINDINGS: Suboptimal exam due to motion. Chondrocalcinosis, consistent  with calcium pyrophosphate deposition disease.  Prominent  degenerative  arthrosis at the thumb CMC joint. Some degenerative arthrosis at the  thumb MCP and IP joints. Distal to the CMC joints is not well  evaluated due to motion. There is a subchondral cystic lesion or  intraosseous ganglion within the proximal scaphoid. No definite  scaphoid fracture is appreciated. If pain persists in this area, I  would recommend short-term radiographic followup or MRI.      Impression    IMPRESSION:  1. Exam compromised by motion.  2. No obvious scaphoid fracture is appreciated.  3. Additional findings discussed above.     STEFANIE SIM MD   UA reflex to Microscopic and Culture   Result Value Ref Range    Color Urine [place]     Appearance Urine Cloudy     Glucose Urine Negative NEG^Negative mg/dL    Bilirubin Urine Negative NEG^Negative    Ketones Urine 20 (A) NEG^Negative mg/dL    Specific Gravity Urine 1.020 1.003 - 1.035    Blood Urine Negative NEG^Negative    pH Urine 5.0 5.0 - 7.0 pH    Protein Albumin Urine 100 (A) NEG^Negative mg/dL    Urobilinogen mg/dL 4.0 (H) 0.0 - 2.0 mg/dL    Nitrite Urine Negative NEG^Negative    Leukocyte Esterase Urine Small (A) NEG^Negative    Source Catheterized Urine     RBC Urine 1 0 - 2 /HPF    WBC Urine 104 (H) 0 - 2 /HPF    Mucous Urine Present (A) NEG^Negative /LPF   Basic metabolic panel   Result Value Ref Range    Sodium 134 133 - 144 mmol/L    Potassium 4.1 3.4 - 5.3 mmol/L    Chloride 102 94 - 109 mmol/L    Carbon Dioxide 24 20 - 32 mmol/L    Anion Gap 8 3 - 14 mmol/L    Glucose 140 (H) 70 - 99 mg/dL    Urea Nitrogen 18 7 - 30 mg/dL    Creatinine 0.69 0.52 - 1.04 mg/dL    GFR Estimate 80 >60 mL/min/1.7m2    GFR Estimate If Black >90 >60 mL/min/1.7m2    Calcium 9.1 8.5 - 10.1 mg/dL   CBC with platelets differential   Result Value Ref Range    WBC 11.6 (H) 4.0 - 11.0 10e9/L    RBC Count 4.10 3.8 - 5.2 10e12/L    Hemoglobin 13.7 11.7 - 15.7 g/dL    Hematocrit 40.3 35.0 - 47.0 %    MCV 98 78 - 100 fl    MCH 33.4 (H) 26.5 - 33.0 pg    MCHC 34.0 31.5  - 36.5 g/dL    RDW 12.1 10.0 - 15.0 %    Platelet Count 292 150 - 450 10e9/L    Diff Method Automated Method     % Neutrophils 64.6 %    % Lymphocytes 22.3 %    % Monocytes 12.5 %    % Eosinophils 0.1 %    % Basophils 0.2 %    % Immature Granulocytes 0.3 %    Nucleated RBCs 0 0 /100    Absolute Neutrophil 7.5 1.6 - 8.3 10e9/L    Absolute Lymphocytes 2.6 0.8 - 5.3 10e9/L    Absolute Monocytes 1.5 (H) 0.0 - 1.3 10e9/L    Absolute Eosinophils 0.0 0.0 - 0.7 10e9/L    Absolute Basophils 0.0 0.0 - 0.2 10e9/L    Abs Immature Granulocytes 0.0 0 - 0.4 10e9/L    Absolute Nucleated RBC 0.0    INR   Result Value Ref Range    INR 3.20 (H) 0.86 - 1.14   Lactic acid whole blood   Result Value Ref Range    Lactic Acid 0.6 (L) 0.7 - 2.0 mmol/L   Troponin I   Result Value Ref Range    Troponin I ES <0.015 0.000 - 0.045 ug/L   Basic metabolic panel   Result Value Ref Range    Sodium 138 133 - 144 mmol/L    Potassium 3.6 3.4 - 5.3 mmol/L    Chloride 105 94 - 109 mmol/L    Carbon Dioxide 26 20 - 32 mmol/L    Anion Gap 7 3 - 14 mmol/L    Glucose 98 70 - 99 mg/dL    Urea Nitrogen 16 7 - 30 mg/dL    Creatinine 0.68 0.52 - 1.04 mg/dL    GFR Estimate 82 >60 mL/min/1.7m2    GFR Estimate If Black >90 >60 mL/min/1.7m2    Calcium 8.5 8.5 - 10.1 mg/dL   CBC with platelets differential   Result Value Ref Range    WBC 8.8 4.0 - 11.0 10e9/L    RBC Count 3.56 (L) 3.8 - 5.2 10e12/L    Hemoglobin 11.9 11.7 - 15.7 g/dL    Hematocrit 35.6 35.0 - 47.0 %     78 - 100 fl    MCH 33.4 (H) 26.5 - 33.0 pg    MCHC 33.4 31.5 - 36.5 g/dL    RDW 12.0 10.0 - 15.0 %    Platelet Count 253 150 - 450 10e9/L    Diff Method Automated Method     % Neutrophils 60.7 %    % Lymphocytes 23.9 %    % Monocytes 13.9 %    % Eosinophils 1.0 %    % Basophils 0.2 %    % Immature Granulocytes 0.3 %    Nucleated RBCs 0 0 /100    Absolute Neutrophil 5.3 1.6 - 8.3 10e9/L    Absolute Lymphocytes 2.1 0.8 - 5.3 10e9/L    Absolute Monocytes 1.2 0.0 - 1.3 10e9/L    Absolute Eosinophils  0.1 0.0 - 0.7 10e9/L    Absolute Basophils 0.0 0.0 - 0.2 10e9/L    Abs Immature Granulocytes 0.0 0 - 0.4 10e9/L    Absolute Nucleated RBC 0.0    INR   Result Value Ref Range    INR 3.57 (H) 0.86 - 1.14   Hepatic panel   Result Value Ref Range    Bilirubin Direct 0.2 0.0 - 0.2 mg/dL    Bilirubin Total 1.0 0.2 - 1.3 mg/dL    Albumin 2.4 (L) 3.4 - 5.0 g/dL    Protein Total 6.7 (L) 6.8 - 8.8 g/dL    Alkaline Phosphatase 61 40 - 150 U/L    ALT 12 0 - 50 U/L    AST 17 0 - 45 U/L   CRP inflammation   Result Value Ref Range    CRP Inflammation 180.0 (H) 0.0 - 8.0 mg/L   Procalcitonin   Result Value Ref Range    Procalcitonin 0.15 ng/ml   Erythrocyte sedimentation rate auto   Result Value Ref Range    Sed Rate 73 (H) 0 - 30 mm/h   INR   Result Value Ref Range    INR 2.77 (H) 0.86 - 1.14   Basic metabolic panel   Result Value Ref Range    Sodium 135 133 - 144 mmol/L    Potassium 3.9 3.4 - 5.3 mmol/L    Chloride 105 94 - 109 mmol/L    Carbon Dioxide 22 20 - 32 mmol/L    Anion Gap 8 3 - 14 mmol/L    Glucose 114 (H) 70 - 99 mg/dL    Urea Nitrogen 17 7 - 30 mg/dL    Creatinine 0.55 0.52 - 1.04 mg/dL    GFR Estimate >90 >60 mL/min/1.7m2    GFR Estimate If Black >90 >60 mL/min/1.7m2    Calcium 8.7 8.5 - 10.1 mg/dL   INR   Result Value Ref Range    INR 4.25 (H) 0.86 - 1.14   Lactic acid level STAT   Result Value Ref Range    Lactic Acid 1.5 0.7 - 2.0 mmol/L   INR   Result Value Ref Range    INR 1.28 (H) 0.86 - 1.14   Lactic acid level STAT   Result Value Ref Range    Lactic Acid 1.0 0.7 - 2.0 mmol/L   CRP inflammation   Result Value Ref Range    CRP Inflammation 30.2 (H) 0.0 - 8.0 mg/L   CBC with platelets   Result Value Ref Range    WBC 9.5 4.0 - 11.0 10e9/L    RBC Count 3.78 (L) 3.8 - 5.2 10e12/L    Hemoglobin 12.4 11.7 - 15.7 g/dL    Hematocrit 37.2 35.0 - 47.0 %    MCV 98 78 - 100 fl    MCH 32.8 26.5 - 33.0 pg    MCHC 33.3 31.5 - 36.5 g/dL    RDW 12.3 10.0 - 15.0 %    Platelet Count 369 150 - 450 10e9/L   Basic metabolic panel    Result Value Ref Range    Sodium 136 133 - 144 mmol/L    Potassium 3.6 3.4 - 5.3 mmol/L    Chloride 102 94 - 109 mmol/L    Carbon Dioxide 27 20 - 32 mmol/L    Anion Gap 7 3 - 14 mmol/L    Glucose 101 (H) 70 - 99 mg/dL    Urea Nitrogen 12 7 - 30 mg/dL    Creatinine 0.65 0.52 - 1.04 mg/dL    GFR Estimate 87 >60 mL/min/1.7m2    GFR Estimate If Black >90 >60 mL/min/1.7m2    Calcium 8.6 8.5 - 10.1 mg/dL   Erythrocyte sedimentation rate auto   Result Value Ref Range    Sed Rate 95 (H) 0 - 30 mm/h   CK total   Result Value Ref Range    CK Total 103 30 - 225 U/L   EKG 12 lead   Result Value Ref Range    Interpretation ECG Click View Image link to view waveform and result    Social Work IP Consult    Narrative    Sweta Izquierdo CM     12/19/2017 11:13 AM  Physical Therapy recommends TCU. FAITH visited patient in room. She   is sleeping. Contacted her granddaughter, Carolina Boothe   ([phone]). She is currently at [facility] but wants to be   kept up to date on situation. She cares for her grandmother. She   would like referrals sent to [facility] and [facility] for a shared room.   She will be back in [place] on Thursday. Sent referrals.     FAITH will continue to follow patient until discharge for any   additional needs.     Sully Izquierdo RN, BSN, CTS  [facility]  [phone]    Addendum 11:07am - Received call from Aleida at [facility]. They want to   know dc plan before accepting patient. Aleida also wanted to know   more about her skin condition. Patient has pressure injury to   bilateral heels requiring heel boots. Skin is red & blanchable.   Also has healing coccyx injury. Covered in mepilex. Wound base is   blanchable erythema with periwound skin intact. Repeated this   information to Aleida. Estimate patient's improvement fair to good.   FAITH will keep [facility] updated on patient's dc plan.               ds   Urine Culture Aerobic Bacterial   Result Value Ref Range    Specimen Description Catheterized Urine     Special Requests  Specimen received in preservative     Culture Micro (A)      >100,000 colonies/mL  Aerococcus urinae  Identification obtained by MALDI-TOF mass spectrometry research use only database. Test   characteristics determined and verified by the Infect[facility] Diagnostic Laboratory   ([facility]) [place], [place].         Susceptibility    Aerococcus urinae - KEITH     PENICILLIN <=0.03 Sensitive ug/mL     VANCOMYCIN 0.25 Sensitive ug/mL     TETRACYCLINE <=0.5 Sensitive ug/mL     CEFOTAXIME <=0.25 Sensitive ug/mL     CEFTRIAXONE <=0.25 Sensitive ug/mL     LEVOFLOXACIN 2.0 Sensitive ug/mL     Trimethoprim/Sulfa <=0.25/4.75 Sensitive ug/mL   Blood culture   Result Value Ref Range    Specimen Description Blood Left Hand     Culture Micro No growth after 5 days    Blood culture   Result Value Ref Range    Specimen Description Blood Left Arm     Special Requests Aerobic and anaerobic bottles received     Culture Micro No growth after 5 days

## 2017-12-19 NOTE — PLAN OF CARE
Problem: Patient Care Overview  Goal: Plan of Care/Patient Progress Review  At the start of the shift, pt was getting healing touch.  Transfers with the lift.  Turn & reposition Q 2 hours.   A/Ox2.  VSS.  C/o pain to right wrist.  Oxycodone given with some relief.  Pt is also on scheduled MScontin.   LS dim.  SOB.  Pt does have sling for right arm.  Hartman cath in place.  Mepilex dressing to coccyx CDI.  Heel boots in place.  Plan to have palliative care consult & recheck inflammation markers in the am.

## 2017-12-19 NOTE — PROGRESS NOTES
"Patient referred for Healing Touch by pain team. Patient tearful as this writer explaining Healing Touch.  Patient consented to HT session. Pain rated 9/10 and anxiety at an extreme level.   When asked if anything hurts besides he arm she replied \"My soul!\" and she started to cry.  Healing Touch intention was for her to have her pain and anxiety levels at a manageable level so she can sleep.  We also asked for her Guardian Mikel to work with us. Performed Healing Touch with music and patient went to sleep shortly after session started. Patient continued to sleep as the bed was lowered. Patient unable to report pain relief and wished to be left sleeping when we were done as agreed to prior to session.  Time spent with patient 60 minutes.  Nate Santiago RN BSN BC HBN-BC  "

## 2017-12-19 NOTE — PLAN OF CARE
Problem: Patient Care Overview  Goal: Plan of Care/Patient Progress Review  OT:Attempted to see pt.--declined at this time(including ex.'s in bed/R hand ex.'s), wanting to sleep. Will reschedule.

## 2017-12-19 NOTE — PROGRESS NOTES
"[facility]  Hospitalist Progress Note  Edgar Braga MD 12/19/2017    Reason for Stay (Diagnosis): Fall, possibly associated with UTI         Assessment and Plan:      Summary of Stay: Chuyita Gayle is a 85 year old female w/ hx of chronic pain syndrome, a-fib on coumadin, anxiety, fibromyalgia, HTN, asthma who presented on 12/13/2017 following a fall.  She reportedly has been requiring more and more assistance at her [facility] even with transfers to the point where per her granddaughter she essentially was requiring SNF type care already and was needing heavy assist of two or a lift (which she often refused to use and was basically just bed-bound/chair bound).  She reportedly fell during a transfer and she complained of right wrist and elbow pain and in the ED was found to have evidence of UTI with early Sepsis.  I note that in the ED, the patient was found to have a mildly elevated WBC, but no left shift. In addition, she had LGT and UA is suggestive of infection, though LE is \"small\". Ketones were positive, possibly related to decreased oral intake.  She was started on IV ceftriaxone and admitted for further care.  Following admission Chuyita was noted to have fairly profound diffuse pain out of proportion to what may have been expected based on her fall.  Based on markedly elevated inflammatory markers and fairly diffuse MSK pain with report of prior hx of  \"inflammatory arthritis\" she was empirically started on steroids for suspected PMR (initially solumedrol 1 mg/kg once IV, then Prednisone 20 mg po daily w/ taper).  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    She has been very slow to improve here in spite of completing an antibiotic course and improved pain.  She has been anxious and has been requiring a lift for transfers.  We are working on weaning off of narcotics as she has seemed somewhat sedated.      The plan is to gradually " wean down on narcotics (has been on MS contin and prn oxycodone), work on mobilization and discharge her to a supported living environment in the coming days w/ gradual prednisone wean and PCP vs rheum follow up.    I had a long talk with her grand daughter, , who confirms that Chuyita has been gradually failing for the past couple of years and was to the point of requiring lift transfers the past couple of months.  The overall goal for her is comfort/minimizing suffering.  I discussed her hospital course in detail and appropriate follow up.      Problem List:   1. Severe diffuse pain. Suspect PMR. Apparently much more comfortable with much less generalized pain and though still with focal complaints of right hand, wrist and shoulder pain.  ?component from falls as well.  --continue prednisone 15 mg daily for now, ?remain at this dose vs slowly taper pending close follow up plan.  --Palliative care consult to help family to make plans. PT and OT following.   --rechecked inflammatory markers with significant improvement.  --check CK for improvement.    2. Right distal radius fracture. CT scan of the right hand completed 12/16 shows no new or additional fractures.     3. Right shoulder pain, present from well before the hospitalization.     4. Possible UTI. Completed Ceftriaxone. (Aerococcus growing on the first day), pan-sensitive (including ceftriaxone).     5. Supratherapeutic INR on admission. Coumadin held due to demonstrated very high fall risk.  Rechecked and now normalized.  Risks of warfarin likely exceed benefits in the short term.      6. Atrial fibrillation for which she is chronically anticoagulated. With her tendency to fall, tentatively plan to discontinuation of Warfarin though pending improvement in strength this could always be readdressed at short term follow up.    DVT Prophylaxis: Pneumatic Compression Devices  Code Status: DNR/DNI.  From talking with her family the overall goal here is  "comfort and they do not want aggressive cares.  Discharge Dispo: TCU.  Needs more services than are available at her nursing home.  Estimated Disch Date / # of Days until Disch: 1-2 days.  Weaning narcotics, working on mobilizing her more.  Has been somewhat somnolent.        Interval History (Subjective):      Somewhat somnolent  Reviewed further history at length  Inflammatory markers much better  Pain overall much better but she continues to request narcotics  Stopped MS contin due to sedation, change oxycodone to 5 mg prn from 10 mg prn  encouraging her to mobilize  Attempted to call her grand daughter, joleen but couldn't reach her                  Physical Exam:      Last Vital Signs:  /66 (BP Location: Left arm)  Pulse 75  Temp 96.9  F (36.1  C) (Oral)  Resp 18  Ht 1.626 m (5' 4\")  Wt 65.2 kg (143 lb 11.2 oz)  SpO2 92%  BMI 24.67 kg/m2    I/O last 3 completed shifts:  In: 460 [P.O.:460]  Out: 1475 [Urine:1475]    Constitutional: Awake, alert, comfortable. Ongoing tenderness over the right hand and wrist, but otherwise minimal facial or other muscular tenderness.   Respiratory: Clear to auscultation bilaterally, no crackles or wheezing   Cardiovascular: Regular rate and rhythm, normal S1 and S2, and no murmur noted   Abdomen: Normal bowel sounds, soft, non-distended, non-tender   Skin: No rashes, no cyanosis, dry to touch   Neuro: Alert and appears coherent, oriented to situation.    Extremities: Right hand is quite edematous with erythema over the third MCP. Bilat LE edema.   Other(s):        All other systems: Negative          Medications:      All current medications were reviewed with changes reflected in problem list.         Data:      All new lab and imaging data was reviewed.   Labs/Imaging:  Results for orders placed or performed during the hospital encounter of 12/13/17 (from the past 24 hour(s))   CRP inflammation   Result Value Ref Range    CRP Inflammation 30.2 (H) 0.0 - 8.0 mg/L   CBC with " platelets   Result Value Ref Range    WBC 9.5 4.0 - 11.0 10e9/L    RBC Count 3.78 (L) 3.8 - 5.2 10e12/L    Hemoglobin 12.4 11.7 - 15.7 g/dL    Hematocrit 37.2 35.0 - 47.0 %    MCV 98 78 - 100 fl    MCH 32.8 26.5 - 33.0 pg    MCHC 33.3 31.5 - 36.5 g/dL    RDW 12.3 10.0 - 15.0 %    Platelet Count 369 150 - 450 10e9/L   Basic metabolic panel   Result Value Ref Range    Sodium 136 133 - 144 mmol/L    Potassium 3.6 3.4 - 5.3 mmol/L    Chloride 102 94 - 109 mmol/L    Carbon Dioxide 27 20 - 32 mmol/L    Anion Gap 7 3 - 14 mmol/L    Glucose 101 (H) 70 - 99 mg/dL    Urea Nitrogen 12 7 - 30 mg/dL    Creatinine 0.65 0.52 - 1.04 mg/dL    GFR Estimate 87 >60 mL/min/1.7m2    GFR Estimate If Black >90 >60 mL/min/1.7m2    Calcium 8.6 8.5 - 10.1 mg/dL   Erythrocyte sedimentation rate auto   Result Value Ref Range    Sed Rate 95 (H) 0 - 30 mm/h   CK total   Result Value Ref Range    CK Total 103 30 - 225 U/L

## 2017-12-19 NOTE — PLAN OF CARE
Problem: Patient Care Overview  Goal: Plan of Care/Patient Progress Review  Outcome: No Change  Pain: C/O right shoulder pain.  PRN and scheduled pain meds given.  See MAR  LOC: alert to self.  Able to make needs known.  Forgetful.  Mobility: Assist of 2 and lift  Lungs: MORAN, diminished.  95% RA  Tele: No tele  GI: Regular diet  : spring catheter  IV: PIV saline locked  Other: mepliex on coccyx.  Continue pain control, iv rocephin, steroids per orders.  Palliative, woc, ortho, pt/ot/sw following.  D/C to TCU when ready.    22:20 Pt lost IV access this shift.  Staff attempt x1 without success.  Pt did not tolerate very well.  It was painful for her and caused her to be anxious and cry.  Anesthesia nurse came to try IV access and could not get it either.  IV is being used for IV antibiotics and pain meds.  Text page sent to MD to update and request picc line.  MD returned call.  Said to leave IV out tonight.  No orders given at this time to replace the missed rocephin dose.

## 2017-12-19 NOTE — PLAN OF CARE
Problem: Patient Care Overview  Goal: Plan of Care/Patient Progress Review  PT: Patient awaiting a palliative consult to determine goals of care.

## 2017-12-19 NOTE — PROGRESS NOTES
Cross-cover:    I did not ask nurses to try a third time to replace it. I believe she has gotten enough IV abx in any case...    KP

## 2017-12-19 NOTE — PLAN OF CARE
"Patient up in chair for meals and bed side commode. BM this am. Falling asleep and unable to stay awake. Napping most of shift. When awake weeping and wanting to discuss her lack of family support. \"have no one to take me to appts., and my 2 grandaughter's take turns seeing me.\" She keep repeating that she has no family and that she can't return home and thinks it is best if she stays here. VSS 550cc out of spring.   "

## 2017-12-20 NOTE — PROGRESS NOTES
"CLINICAL NUTRITION SERVICES  -  ASSESSMENT NOTE      Recommendations Ordered by Registered Dietitian (RD):   Oral nutrition supplements  MVI+M   Malnutrition:      Severe malnutrition in the context of chronic illness     REASON FOR ASSESSMENT  Chuyita Gayle is a 85 year old female seen by the dietitian for LOS    NUTRITION HISTORY  - Information obtained from patient  - Patient is on a regular diet at home. Receives meals at [facility] - up to TID but only taking 1 meal per day (only pays for what she takes). Second meal of day includes toast. Also drinks 1 Ensure per day. Dentures are relatively new and still fit. Denies current issues chewing and swallowing (SLP followed during this admit). Denies food allergies. Overall poor appetite + early satiety, eating 25-50% of normal for \"weeks.\"    CURRENT NUTRITION ORDERS  - Diet: regular  - Supplement: none  Current Intake/Tolerance:  - Per flow sheet review, 0-75% intake for majority of documented meals.  - Factors affecting nutrition intake include: decreased appetite, somnolence, early satiety    PHYSICAL FINDINGS  Observed  Fat wasting:    Orbital region and surrounding area - dark circles, loose skin and somewhat hollow look    Upper arm region / triceps/biceps - some depth pinch but not ample with loose skin  Muscle wasting - suspect related to sarcopenia, exacerbated by current illness:    Temple - mild or greater hollowing    Clavicle and acromion bone region: very thin with minimal muscle mass noted     Dorsal hand / Interosseous Muscle - depressed area between thumb and forefinger (L hand only, R hand with edema)    Gastrocnemius/Posterior Calf region - at least moderate loss, loose skin with true extent masked by edema  Obtained from Chart/Interdisciplinary Team  BM: 12/19  Tai nutrition score: 2; total score: 15  Skin per WOCN 12/14: buttocks 5x4 cm - resurfaced from previous injury, bilateral heels blanchable  Palliative consult placed 12/20; pain related to " "PMR    ANTHROPOMETRICS  Height: 5' 4\"  Weight: 65.2 kg (143 lb)  Body mass index is 24.67 kg/(m^2).  Weight Status:  Normal BMI  IBW: 54.5 kg +/- 10%, 120% of IBW   Weight History:  Weight appears relatively stable with +/- 5 kg fluctuations, as noted below  Wt Readings from Last 10 Encounters:   12/13/17 65.2 kg (143 lb 11.2 oz)   04/12/17 61.2 kg (135 lb)   02/19/17 68 kg (150 lb)   02/14/17 65.1 kg (143 lb 9.6 oz)   02/10/17 70.3 kg (155 lb)       ASSESSED NUTRITION NEEDS (PER APPROVED PRACTICE GUIDELINES, Dosing weight: 57 kg AdjBW)  Estimated Energy Needs: 3686-1717 kcals (25-30 Kcal/Kg)  Justification: maintenance  Estimated Protein Needs: 69-86grams protein (1.2-1.5 g pro/Kg)  Justification: wound healing and preservation of lean body mass  Estimated Fluid Needs: >1 mL/Kcal  Justification: maintenance    LABS  Labs reviewed    MEDICATIONS  Medications reviewed  Prednisone    MALNUTRITION:  % Weight Loss:None noted  % Intake:</= 75% for >/= 1 month (severe malnutrition)  Subcutaneous Fat Loss: Mild to moderate, as noted above  Muscle Loss: At least moderate, as noted above  Fluid Retention:None noted    Malnutrition Diagnosis: Severe malnutrition  In Context of:  Chronic illness or disease    NUTRITION DIAGNOSIS:  Malnutrition related to decreased oral intake and increased needs for wound healing as evidenced by variable intake for >1 month, fat/muscle loss and pressure injuries on bilateral heels/buttocks    INTERVENTIONS  Recommendations / Nutrition Prescription  Continue regular diet as ordered + oral nutrition supplements to increase calorie and protein intake    Multivitamin+Mineral per pressure injury protocol (defer further supplementation until staging established, goals of care)    Implementation  Nutrition education: Encouraged frequent intake of bites/sips as able  Medical food supplement: Ensure 2 pm  Multivitamin/Minerals: Thera-Vit-M  Collaboration and Referral of care: Discussed patient during " interdisciplinary care rounds this morning    Goals  Patient to consume >/= 50% of meals TID and >/=1 high protein supplements per day    MONITORING AND EVALUATION:  Progress towards goals will be monitored and evaluated per protocol and Practice Guidelines      Rere Gutierrez RDN, LD, CNSC  Pager - 3rd floor/ICU: [phone]  Pager - All other floors: [phone]  Pager - Weekend/holiday: [phone]  Office: [phone]

## 2017-12-20 NOTE — PROGRESS NOTES
"SPIRITUAL HEALTH SERVICES  SPIRITUAL ASSESSMENT Progress Note  [facility] Med/Surg 3rd floor    PRIMARY FOCUS:     Goals of care    Symptom/pain management    Emotional/spiritual/Mandaeism distress    Support for coping    ILLNESS CIRCUMSTANCES:   Reviewed documentation. Reflective conversation shared with pt, Chuyita, which integrated elements of illness and family narratives.     Context of Serious Illness/Symptom(s) - Chuyita shares that \"I fell and have been in a lot of pain.\" Chart review also shows hx of chronic pain syndrome, fibromyalgia, A-fib, HTN, and anxiety.     Resources for Support - Chuyita names her granddtrs Stacey and .     DISTRESS:     Emotional/Existential/Relational Distress - Chuyita shared the following stressors:    Death of her four adult male children (Alex, Ronen, Sea, Anirudh).     Chronic pain she experiences    Distress over \"my assisted living home won't take me back.\"     Spiritual/Gnosticism Distress - None expressed.     Social/Cultural/Economic Distress - As notes above, Chuyita says that her greatest worry is \"Where will I find a home?\"     SPIRITUAL/Yarsani COPING:     Denominational/Stephanie - Mu-ism, not connected to a Worship but appreciates  visits at her [facility]. She notes that \"God has gotten me through to this point.\"     Spiritual Practice(s) - Prayer. Chuyita notes that \"I can always use more prayer.\"     Emotional/Existential/Relational Connections - Chuyita feels strong connection to her two granddtrs and two great grandchildren (ages 2 and 4) who are visiting [place] right now.   GOALS OF CARE:    Goals of Care - Chuyita hopes to find \"a good place to go since I can't go back to my assisted living facility.\"     Meaning/Sense-Making - Chuyita was very tearful as she spoke about the death of her four boys and her anticipating the change involved in potentially moving to a new level of care facility. She also notes that \"God is with me, I know that\" and invited prayer. "     PLAN: Will continue to follow later in the week for emotional/spiritual support.    Armen Miramontes M.Div.  Staff   Pager [phone]

## 2017-12-20 NOTE — PLAN OF CARE
Patient up in chair via lift. To BSC for BM. More alert today. VSS Plan for D/c tomorrow to [facility] 0945. Right hand less swollen.

## 2017-12-20 NOTE — PLAN OF CARE
Problem: Patient Care Overview  Goal: Plan of Care/Patient Progress Review  Outcome: No Change  VS stable. Diminished LS. Alert x 2. +3 edema to right hand. Complained of pain and pain med's given with relief. Slept well throughout the night.

## 2017-12-20 NOTE — PROGRESS NOTES
Patient will be ready to discharge tomorrow per MD. [facility] has accepted patient for a shared TCU room. [facility] ([phone]) will transport via wheelchair at 9:45am. Updated Susy - admissions at [facility]. That works for them. PAS to be done. Updated bedside RN, SW & chg RN.     CM will continue to follow patient until discharge for any additional needs.     Sully Izquierdo, RN, BSN, CTS  [facility]  [phone]

## 2017-12-20 NOTE — PROGRESS NOTES
Update: notified of tachycardia up to 130s.  Suspected a-fib w/ RVR.  Known issue for this patient.  --EKG shows a-fib w/ RVR,will start tele monitoring.  --for a-fib w/ RVR > 120 will try IV metoprolol 5 mg prn.    --will change BID atenolol to TID metoprolol tartrate 25 mg for now, adjust as needed w/ hold parameters ordered.  --check and replace  and k    Edgar Braga MD

## 2017-12-20 NOTE — PROGRESS NOTES
"[facility]  Hospitalist Progress Note  Edgar Braga MD 12/20/2017    Reason for Stay (Diagnosis): Fall, possibly associated with UTI         Assessment and Plan:      Summary of Stay: Chuyita Gayle is a 85 year old female w/ hx of chronic pain syndrome, a-fib on coumadin, anxiety, fibromyalgia, HTN, asthma who presented on 12/13/2017 following a fall.  She reportedly has been [facility] and [facility] even with transfers to the point where per her granddaughter she essentially was requiring SNF type care already and was needing heavy assist of two or a lift (which she often refused to use and was basically just bed-bound/chair bound).  She reportedly fell during a transfer and she complained of right wrist and elbow pain and in the ED was found to have evidence of UTI with early Sepsis.  I note that in the ED, the patient was found to have a mildly elevated WBC, but no left shift. In addition, she had LGT and UA is suggestive of infection, though LE is \"small\". Ketones were positive, possibly related to decreased oral intake.  She was started on IV ceftriaxone and admitted for further care.  Following admission Chuyita was noted to have fairly profound diffuse pain out of proportion to what may have been expected based on her fall.  Based on markedly elevated inflammatory markers and fairly diffuse MSK pain with report of prior hx of  \"inflammatory arthritis\" she was empirically started on steroids for suspected PMR (initially solumedrol 1 mg/kg once IV, then Prednisone 20 mg po daily w/ taper).  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    She has been very slow to improve here in spite of completing an antibiotic course and improved pain.  She has been anxious and has been requiring a lift for transfers.  We are working on weaning off of narcotics as she has seemed somewhat sedated.      The plan is to gradually " wean down on narcotics (has been on MS contin and prn oxycodone), work on mobilization and discharge her to a supported living environment in the coming days w/ gradual prednisone wean and PCP vs rheum follow up.    I had a long talk with her grand daughter, , who confirms that Chuyita has been gradually failing for the past couple of years and was to the point of requiring lift transfers the past couple of months.  The overall goal for her is comfort/minimizing suffering.  I discussed her hospital course in detail and appropriate follow up.      Problem List:   1. Severe diffuse pain. ?PMR. Apparently much more comfortable with much less generalized pain and though still with focal complaints of right hand, wrist and shoulder pain.  ?component from falls as well.  --continue prednisone 15 mg daily for now, slowly taper off over the next 8 weeks given marked improvement and pending close follow up plan.  --Palliative care consult to help family to make plans. PT and OT following.   --rechecked inflammatory markers with significant improvement.  --checked CK, noted to be normal    2. Right distal radius avulsion fracture. CT scan of the right hand completed 12/16 shows no new or additional fractures.     3. Right shoulder pain, present from well before the hospitalization.     4. Possible UTI. Completed Ceftriaxone. (Aerococcus growing on the first day), pan-sensitive (including ceftriaxone).     5. Supratherapeutic INR on admission. Coumadin held due to demonstrated very high fall risk.  Rechecked and now normalized.  Risks of warfarin likely exceed benefits in the short term.      6. Atrial fibrillation for which she was chronically anticoagulated. With her tendency to fall plan to discontinuation of Warfarin though pending improvement in strength this could always be readdressed at short term follow up.    DVT Prophylaxis: Pneumatic Compression Devices  Code Status: DNR/DNI.  From talking with her family the  "overall goal here is comfort and they do not want aggressive cares.  Discharge Dispo: TCU.  Needs more services than are available at her [facility].  Estimated Disch Date / # of Days until Disch: ?TCU vs LTC tomorrow        Interval History (Subjective):      Awake, calm, pain gradually better.  Reviewed further history at length with her granddaughter yesterday late in the day  Inflammatory markers much better, CK normal                    Physical Exam:      Last Vital Signs:  BP (!) 114/93 (BP Location: Left arm)  Pulse 75  Temp 98.5  F (36.9  C) (Oral)  Resp 18  Ht 1.626 m (5' 4\")  Wt 65.2 kg (143 lb 11.2 oz)  SpO2 93%  BMI 24.67 kg/m2    I/O last 3 completed shifts:  In: 400 [P.O.:400]  Out: 1800 [Urine:1800]    Constitutional: Awake, alert, comfortable. Ongoing tenderness over the right hand and wrist, but otherwise minimal facial or other muscular tenderness.   Respiratory: Clear to auscultation bilaterally, no crackles or wheezing   Cardiovascular: Regular rate and rhythm, normal S1 and S2, and no murmur noted   Abdomen: Normal bowel sounds, soft, non-distended, non-tender   Skin: No rashes, no cyanosis, dry to touch   Neuro: Alert and appears coherent, oriented to situation.    Extremities: Right hand is quite edematous with erythema over the third MCP. Bilat LE edema.   Other(s):        All other systems: Negative          Medications:      All current medications were reviewed with changes reflected in problem list.         Data:      All new lab and imaging data was reviewed.   Labs/Imaging:  No results found for this or any previous visit (from the past 24 hour(s)).    "

## 2017-12-20 NOTE — PLAN OF CARE
Problem: Patient Care Overview  Goal: Plan of Care/Patient Progress Review  Outcome: No Change  A&O X4. VSS on RA. Denies pain. Up with lift. Right arm sling with swollen right hand, encouraged to move fingers hourly. CRP improved 30.2. Added Voltaren gel for pain, decreased PRN Oxycodone and dc'd Oxycontin. Palliative consult tomorrow. Hartman in place with good output. PT/OT following. SW following, plan to discharge to TCU in 1-2 days, then skilled KATE. Continue POC.

## 2017-12-20 NOTE — PROGRESS NOTES
Your information has been submitted on December 20th, 2017 at 02:50:56 PM CST. The confirmation number is [id number]

## 2017-12-20 NOTE — PLAN OF CARE
Problem: Patient Care Overview  Goal: Plan of Care/Patient Progress Review  Discharge Planner OT   Patient plan for discharge: home versus TCU  Current status: Pt went from supine to sit EOB with minimum assist and verbal cues for technique, and extra time. Pt required moderate to maximum assist to scoot self forward. While seated EOB, pt completed 2 grooming and hygiene tasks. Pt demonstrated increase in fatigue during session and demonstrated difficulty with upright sitting balance.     Barriers to return to prior living situation: Pt requiring Ax2 for transfers; pain; decreased endurance for I/ADLs    Recommendations for discharge: TCU   Rationale for recommendations: Pt would benefit from ongoing skilled OT within a TCU to maximize her Ind before returning to her previous living environment.       Entered by: Chon Peña 12/20/2017 3:28 PM

## 2017-12-21 NOTE — PROGRESS NOTES
HE transportation has been canceled and [facility] has been updated on delay of discharge per RN/MD notes and patient cardiac status:  AFIB RVR and Dilt gtt.  Family has been updated by charge RN on floor.     RNCC called and spoke with Carolina about her Grandmother.  They would like to have a CC on behalf of the goals of care.  From what the MD said to her sister yesterday she feels like hospice would be best for her GM and that Palliative care should be involved.  Family wishes that she could return to home [place] and have hospice comfort care.  MD updated and palliative care consult has been placed.       Carolina [phone]  Please call and update her as to when a CC can be scheduled, she is available all day tomorrow.  Luca-Carolina updated by phone VM about SE/RNCC that can be reached tomorrow about setting up a CC meeting with Care Team.    Judi Cameron RN BSN   Float Pool RN  RN Care Coordinator  [facility]   [phone]

## 2017-12-21 NOTE — PROGRESS NOTES
[facility] Nurse Inpatient Adult Pressure Injury (PI) Wound Assessment     Assessment of PI(s) on pt's:   Buttocks and Sudhir Heels    Data:   Patient History:      per MD note(s):    12/13  ASSESSMENT AND PLAN:  Chuyita Gayle is an 85-year-old female who presents to the hospital today with generalized weakness and a fall.  She was found to have a radial avulsion fracture on presentation to the hospital, for which she was placed in a sling.  During her stay in the ER, she was also noticed to be febrile with a fever of 38.4 and concerns for a urinary tract infection and early sepsis, and she is being admitted to the hospital.      Current mattress:  AtmosAir  Current pressure relieving devices:  Pillows    Moisture Management:  Diaper    Current Diet / Nutrition:       Active Diet Order      Combination Diet Regular Diet Adult      Advance Diet as Tolerated        Tai Assessment and sub scores:   Tai Score  Avg: 15.5  Min: 14  Max: 17   Labs:   Recent Labs   Lab Test  12/21/17   0700  12/19/17   0800  12/17/17   0812   12/14/17   0735   ALBUMIN   --    --    --    --   2.4*   HGB  11.2*  12.4   --    --   11.9   INR   --    --   1.28*   < >  3.57*   WBC  8.2  9.5   --    --   8.8   CRP   --   30.2*   --    --   180.0*    < > = values in this interval not displayed.                                                                                                                      Pressure Injury Assessment  (location #1) Buttocks  Wound History:   POA    Specific Dimensions (length x width x depth, in cm) :   5 x 4 cm intact and blanchable resurfaced previous injury    Wound Base: new skin intact with blanchable erythema,     Palpation of the wound bed:  normal    Periwound Skin: intact,      Color: normal and consistent with surrounding tissue    Temperature  normal     Pain:  none    Pressure Injury Assessment  (location #2) Sudhir Heels  Wound History:   Pt. Has history falls    Wound Base:  "Sudhir Posterior heels ,  blanchable pink intact skin.     Palpation of the wound bed:  normal    Periwound Skin: intact,      Color: normal and consistent with surrounding tissue    Temperature  normal     Pain:  absent          Intervention:     Patient's chart evaluated.      Tia Interventions:  Current Tai Interventions and Care Plan reviewed and updated, appropriate at this time.    Wound was fully assessed.    Wound Care: was done: Visual inspection; Pt. Repositioned    Orders reviewed    Supplies  Gathered    Discussed plan of care with Patient and Nurse    Education to pt that she needs to allow staff to turn and reposition           Assessment:     Pressure Injury present on admission: Sudhir Heels intact skin with resolving Buttocks wound- blanchable intact erythema    Status: wound is stable    Markedly improved    Pt was not happy with turns and heel floating. Education on need to turn and float heels for prevention of \"bed sores\" was explained and pt said she understood and would allow staff to perform the turns, etc.         Plan:     Nursing to notify the Provider(s) and re-consult the WOC Nurse if wound(s) deteriorate(s)or if the wound care plan needs reevaluation.    Plan for wound care to wound located on chart: PIP measures, Sudhir Heel suspension boots ordered- pt does not like them.    WOC Nurse will return: completed  Face to face time: 15 min    "

## 2017-12-21 NOTE — PLAN OF CARE
Problem: Patient Care Overview  Goal: Plan of Care/Patient Progress Review  Discharge Planner OT   Patient plan for discharge: home versus TCU  Current status: Pt sitting up in the chair when therapist arrived. Pt completed grooming and hygiene tasks while seated, with emphasis on engaging core with back unsupported. Pt completed 1 grooming task and demonstrated difficulty with upright unsupported sitting. Pt required rest break during task. Pt  requested to complete session early.   Barriers to return to prior living situation: Pt requiring Ax2 for transfers; pain; decreased endurance for I/ADLs    Recommendations for discharge: TCU   Rationale for recommendations: Pt would benefit from ongoing skilled OT within a TCU to maximize her Ind before returning to her previous living environment.       Entered by: Chon Peña 12/21/2017 4:11 PM

## 2017-12-21 NOTE — PROGRESS NOTES
"[facility]  Palliative Care Progress Note  Text Page    Met with Chuyita as she was resting in bed. She was in much better spirits then when I saw her last and indicated that her right arm is \"much better\".     I phoned the patient's granddaughter/next-of-kin Stacey Boothe at [phone]. She was at [employer] claim at the airport and plans to visit her grandmother this evening. She explained that her grandmother's quality of life has dwindled during the past year. Six months ago they explored the benefit of hospice, but at that time \"she was too good\". Since August Chuyita \"seems to be in extreme pain and doesn't have much of a quality of life\". She would prefer that she is signed on to hospice and [facility]. She does not feel that her grandmother would want to try rehab. Stacey plans to be at the hospital tomorrow morning and she plans to call our office once she has a better feel for her arrival time.       Assessment & Plan   1.  Confusion - patient does not demonstrate medical capacity. Family is assisting in care plan.       2.  Pain - Reasonable to try Voltaren topically for right hand pain      3.  Goals of Care: DNR/DNI - Restorative care. Next-of-kin/granddaughter Stacey Boothe would like to [facility] using the hospice benefit.      Vandana Hagen MS, RN, CNS, APRN, ACHPN, FAACVPR  Pain and Palliative Care  Pager [phone]  Office [phone]       Time Spent on this Encounter   I spent  40 minutes in assessment of the patient and discussion with the patient and family. Another 20 minutes in review of chart, documentation and discussion with the health care team.    Interval History   Chart review    Palliative Symptom Review (0=no symptom/no concern, 1=mild, 2=moderate, 3=severe):      Pain: 1-mild      Fatigue: 3-severe      Nausea: 0-none      Constipation: 0-none      Diarrhea: 0-none      Depressive Symptoms: 1-mild      Anxiety: 1-mild      " Drowsiness: 0-none      Poor Appetite: 0-none      Shortness of Breath: 0-none      Insomnia: 0-none        Physical Exam   Temp:  [96  F (35.6  C)-98.2  F (36.8  C)] 96  F (35.6  C)  Heart Rate:  [] 137  Resp:  [12-20] 18  BP: (100-170)/() 147/83  SpO2:  [90 %-96 %] 96 %  143 lbs 11.2 oz  GEN:  Alert, oriented to self, appears comfortable.  HEENT:  Normocephalic/atraumatic, no scleral icterus, no nasal discharge, mouth moist.  RESP:  Symmetric chest rise on inhalation noted.  Normal respiratory effort.  PAIN BEHAVIOR: Cooperative  Psych:  Flat affect, calm, cooperative, conversant but confused.    Medications     diltiazem (CARDIZEM) infusion ADULT 5 mg/hr (12/21/17 1116)     - MEDICATION INSTRUCTIONS -         diltiazem  30 mg Oral Q6H JORGE     multivitamin, therapeutic with minerals  1 tablet Oral Daily     diclofenac  2 g Transdermal 4x Daily     predniSONE  15 mg Oral Daily     fluticasone-vilanterol  1 puff Inhalation Daily     gabapentin (NEURONTIN) tablet 600 mg  600 mg Oral At Bedtime     latanoprost  1 drop Both Eyes At Bedtime     omeprazole (priLOSEC) CR capsule 20 mg  20 mg Oral QAM AC     senna-docusate  1 tablet Oral Daily       Data   Results for orders placed or performed during the hospital encounter of 12/13/17 (from the past 24 hour(s))   Magnesium   Result Value Ref Range    Magnesium 2.2 1.6 - 2.3 mg/dL   Potassium   Result Value Ref Range    Potassium 5.0 3.4 - 5.3 mmol/L   Basic metabolic panel   Result Value Ref Range    Sodium 137 133 - 144 mmol/L    Potassium 3.6 3.4 - 5.3 mmol/L    Chloride 103 94 - 109 mmol/L    Carbon Dioxide 27 20 - 32 mmol/L    Anion Gap 7 3 - 14 mmol/L    Glucose 93 70 - 99 mg/dL    Urea Nitrogen 12 7 - 30 mg/dL    Creatinine 0.63 0.52 - 1.04 mg/dL    GFR Estimate >90 >60 mL/min/1.7m2    GFR Estimate If Black >90 >60 mL/min/1.7m2    Calcium 8.5 8.5 - 10.1 mg/dL   CBC with platelets   Result Value Ref Range    WBC 8.2 4.0 - 11.0 10e9/L    RBC Count 3.44 (L)  3.8 - 5.2 10e12/L    Hemoglobin 11.2 (L) 11.7 - 15.7 g/dL    Hematocrit 34.4 (L) 35.0 - 47.0 %     78 - 100 fl    MCH 32.6 26.5 - 33.0 pg    MCHC 32.6 31.5 - 36.5 g/dL    RDW 12.4 10.0 - 15.0 %    Platelet Count 362 150 - 450 10e9/L   Echocardiogram Complete    Narrative    [id number]  [facility]  [id number]  [id number]^JANELL^LALITHA^[facility]  Echocardiography Laboratory  [address] [place] [address]        Name: EDISON WATTS  MRN: [id number]  : 1932  Study Date: 2017 09:15 AM  Age: 85 yrs  Gender: Female  Patient Location: [facility]  Reason For Study: Afib  Ordering Physician: LALITHA MACIEL  Referring Physician: Sen Flores  Performed By: Carmen Izquierdo RDCS     BSA: 1.7 m2  Height: 64 in  Weight: 143 lb  HR: 104  BP: 133/88 mmHg  _____________________________________________________________________________  __        Procedure  Complete Portable Echo Adult.  _____________________________________________________________________________  __        Interpretation Summary     Hyperdynamic left ventricular function  The visual ejection fraction is estimated at >70%.  There is moderate (2+) mitral regurgitation.  The rhythm was rapid atrial fibrillation.  The study was technically difficult. There is no comparison study available.  _____________________________________________________________________________  __        Left Ventricle  The left ventricle is normal in size. Hyperdynamic left ventricular function.  The visual ejection fraction is estimated at >70%. Left ventricular diastolic  function is indeterminate. No regional wall motion abnormalities noted.     Right Ventricle  The right ventricle is normal size. The right ventricular systolic function is  normal.     Atria  Normal left atrial size. Right atrial size is normal. There is no color  Doppler evidence of an atrial shunt.     Mitral Valve  There is mild mitral annular calcification.  The mitral valve leaflets are  mildly thickened. There is moderate (2+) mitral regurgitation.        Tricuspid Valve  There is mild to moderate (1-2+) tricuspid regurgitation. The right  ventricular systolic pressure is approximated at 29.0 mmHg plus the right  atrial pressure. Normal IVC (1.5-2.5cm) with >50% respiratory collapse; right  atrial pressure is estimated at 5-10mmHg.     Aortic Valve  There is mild trileaflet aortic sclerosis. There is trace aortic  regurgitation. No aortic stenosis is present.     Pulmonic Valve  The pulmonic valve is not well seen, but is grossly normal.     Vessels  The aortic root is normal size.     Pericardium  There is no pericardial effusion.        Rhythm  The rhythm was rapid atrial fibrillation.  _____________________________________________________________________________  __  MMode/2D Measurements & Calculations  IVC diam: 2.0 cm     Ao root diam: 2.9 cm  LA dimension: 3.6 cm  asc Aorta Diam: 3.2 cm  LA/Ao: 1.2  LA Volume (BP): 47.0 ml  LA Volume Index (BP): 27.6 ml/m2        Doppler Measurements & Calculations  MV E max rosemarie: 113.0 cm/sec  MV dec time: 0.12 sec  MR ERO: 0.20 cm2  MR volume: 28.9 ml     TR max rosemarie: 268.7 cm/sec  TR max P.0 mmHg  E/E' av.0  Lateral E/e': 11.5  Medial E/e': 12.6           _____________________________________________________________________________  __           Report approved by: Micaela Gong 2017 02:40 PM

## 2017-12-21 NOTE — PROGRESS NOTES
CM: called [facility] to see if they would still have TCU bed for the weekend for pt  P waiting for call back to see what possible bed status will be

## 2017-12-21 NOTE — PROGRESS NOTES
Update:  HR control still not optimal.  Will increase diltiazem from 30 mg Q6H to 60 mg Q6H, continue to try to wean off the drip.  TTE shows EF>70%, suspect she is mildly hypovolemic so will give 500 cc NS bolus over two hours as well.    Edgar Braga MD

## 2017-12-21 NOTE — PLAN OF CARE
Problem: Patient Care Overview  Goal: Plan of Care/Patient Progress Review  Outcome: Improving  A&OX4. Pt went into afib RVR at aprox 1614\ while resting. Hr 130's. Dr. Tyson notified. Metoprolol iv and oral given. HR uncontrolled. Dr. Castro paged at 1920. Iv dilt gtt started at 5mg/hr. RUE pain with movement. Voltaren to the wrist and arm seems to help.

## 2017-12-21 NOTE — PROGRESS NOTES
"[facility]  Hospitalist Progress Note  Edgar Braga MD 12/21/2017    Reason for Stay (Diagnosis): Fall, possibly associated with UTI         Assessment and Plan:      Summary of Stay: Chuyita Gayle is a 85 year old female w/ hx of chronic pain syndrome, a-fib on coumadin, anxiety, fibromyalgia, HTN, asthma who presented on 12/13/2017 following a fall.  She reportedly has been requiring more and more assistance at her detention even with transfers to the point where per her granddaughter she essentially was requiring SNF type care already and was needing heavy assist of two or a lift (which she often refused to use and was basically just bed-bound/chair bound).  She reportedly fell during a transfer and she complained of right wrist and elbow pain and in the ED was found to have evidence of UTI with early Sepsis.  I note that in the ED, the patient was found to have a mildly elevated WBC, but no left shift. In addition, she had LGT and UA is suggestive of infection, though LE is \"small\". Ketones were positive, possibly related to decreased oral intake.  She was started on IV ceftriaxone and admitted for further care.  Following admission Chuyita was noted to have fairly profound diffuse pain out of proportion to what may have been expected based on her fall.  Based on markedly elevated inflammatory markers and fairly diffuse MSK pain with report of prior hx of  \"inflammatory arthritis\" she was empirically started on steroids for suspected PMR (initially solumedrol 1 mg/kg once IV, then Prednisone 20 mg po daily w/ taper).  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    She has been very slow to improve here in spite of completing an antibiotic course and improved pain.  She has been anxious and has been requiring a lift for transfers.  We are working on weaning off of narcotics as she has seemed somewhat sedated.      The plan is to gradually " wean down on narcotics (has been on MS contin and prn oxycodone), work on mobilization and discharge her to a supported living environment in the coming days w/ gradual prednisone wean and PCP vs rheum follow up.    I had a long talk with her grand daughter, , who confirms that Chuyita has been gradually failing for the past couple of years and was to the point of requiring lift transfers the past couple of months.  The overall goal for her is comfort/minimizing suffering.  I discussed her hospital course in detail and appropriate follow up.    On 12/20 she was nearing discharge but developed a-fib w/ RVR which required diltiazem drip (normally on atenolol).  We now are working on transitioning to oral diltiazem.      Problem List:   1. Severe diffuse pain. ?PMR. Apparently much more comfortable with much less generalized pain and though still with focal complaints of right hand, wrist and shoulder pain.  ?component from falls as well.  --continue prednisone 15 mg daily for now, slowly taper off over the next 8 weeks given marked improvement and pending close follow up plan.  --Palliative care consult to help family to make plans.  Family wants to meet 12/22.   -- PT and OT following.   --rechecked inflammatory markers with significant improvement.  --checked CK, noted to be normal    2. Right distal radius avulsion fracture. CT scan of the right hand completed 12/16 shows no new or additional fractures.     3. Right shoulder pain, present from well before the hospitalization.     4. Possible UTI, no specific signs of sepsis in my opinion. Completed Ceftriaxone. (Aerococcus growing on the first day), pan-sensitive (including ceftriaxone).     5. Supratherapeutic INR on admission. Coumadin held due to demonstrated very high fall risk.  Rechecked and now normalized.  Risks of warfarin likely exceed benefits in the short term.      6. Atrial fibrillation for which she was chronically anticoagulated. With her  "tendency to fall plan to discontinuation of Warfarin though pending improvement in strength this could always be readdressed at short term follow up.  --developed RVR on 12/20.  ?trigger.  Will check TTE.  Otherwise clinically better.    --Now on dilt drip, adding diltiazem 30 mg Q6H with the hope of weaning off today.  May need to increase dose.    DVT Prophylaxis: Pneumatic Compression Devices  Code Status: DNR/DNI.  From talking with her family the overall goal here is comfort and they do not want aggressive cares.  Discharge Dispo: TCU.  Needs more services than are available at her [facility].  Estimated Disch Date / # of Days until Disch: ?TCU vs LTC tomorrow if off dilt drip, HR controlled.        Interval History (Subjective):      Awake, calm, pain gradually better.  --developed RVR on 12/20.  ?trigger.  Will check TTE.  Otherwise clinically better.    --Now on dilt drip, adding diltiazem 30 mg Q6H with the hope of weaning off today.   --discussed w/ palliative care - family hopeful to meet tomorrow.                    Physical Exam:      Last Vital Signs:  /71 (BP Location: Left arm)  Pulse 75  Temp 97.8  F (36.6  C) (Oral)  Resp 20  Ht 1.626 m (5' 4\")  Wt 65.2 kg (143 lb 11.2 oz)  SpO2 95%  BMI 24.67 kg/m2    I/O last 3 completed shifts:  In: 86.1 [I.V.:86.1]  Out: 400 [Urine:400]    Constitutional: Awake, alert, comfortable. Ongoing tenderness over the right hand and wrist, but otherwise minimal facial or other muscular tenderness.   Respiratory: Clear to auscultation bilaterally, no crackles or wheezing   Cardiovascular: Regular rate and rhythm, normal S1 and S2, and no murmur noted   Abdomen: Normal bowel sounds, soft, non-distended, non-tender   Skin: No rashes, no cyanosis, dry to touch   Neuro: Alert and appears coherent, oriented to situation.    Extremities: Right hand is quite edematous with erythema over the third MCP. Bilat LE edema.   Other(s):        All other systems: Negative          " Medications:      All current medications were reviewed with changes reflected in problem list.         Data:      All new lab and imaging data was reviewed.   Labs/Imaging:  Results for orders placed or performed during the hospital encounter of 12/13/17 (from the past 24 hour(s))   Magnesium   Result Value Ref Range    Magnesium 2.2 1.6 - 2.3 mg/dL   Potassium   Result Value Ref Range    Potassium 5.0 3.4 - 5.3 mmol/L   Basic metabolic panel   Result Value Ref Range    Sodium 137 133 - 144 mmol/L    Potassium 3.6 3.4 - 5.3 mmol/L    Chloride 103 94 - 109 mmol/L    Carbon Dioxide 27 20 - 32 mmol/L    Anion Gap 7 3 - 14 mmol/L    Glucose 93 70 - 99 mg/dL    Urea Nitrogen 12 7 - 30 mg/dL    Creatinine 0.63 0.52 - 1.04 mg/dL    GFR Estimate >90 >60 mL/min/1.7m2    GFR Estimate If Black >90 >60 mL/min/1.7m2    Calcium 8.5 8.5 - 10.1 mg/dL   CBC with platelets   Result Value Ref Range    WBC 8.2 4.0 - 11.0 10e9/L    RBC Count 3.44 (L) 3.8 - 5.2 10e12/L    Hemoglobin 11.2 (L) 11.7 - 15.7 g/dL    Hematocrit 34.4 (L) 35.0 - 47.0 %     78 - 100 fl    MCH 32.6 26.5 - 33.0 pg    MCHC 32.6 31.5 - 36.5 g/dL    RDW 12.4 10.0 - 15.0 %    Platelet Count 362 150 - 450 10e9/L

## 2017-12-21 NOTE — PROGRESS NOTES
Patient with afib with RVR throughout evening.  Metoprolol po and IV given a little over 2 hours ago with no effect.  HR remains at 130-140s.  Reviewed telemetry.  -stop IV metoprolol prn  -start IV diltiazem gtt, target HR < 110.  Should have the BP for it

## 2017-12-21 NOTE — PLAN OF CARE
Problem: Patient Care Overview  Goal: Plan of Care/Patient Progress Review  PT: HR elevated, 130 at rest, will hold on PT currently.

## 2017-12-21 NOTE — PLAN OF CARE
Problem: Patient Care Overview  Goal: Plan of Care/Patient Progress Review  Outcome: No Change  VS stable. Diminished LS. +2 edema to right hand. Complains of pain in right arm and pain med's given. Tele is a fib RVR and had one 2 second pause. Patient on Cardizem drip. Up most of the night.

## 2017-12-21 NOTE — PLAN OF CARE
Problem: Patient Care Overview  Goal: Plan of Care/Patient Progress Review  Outcome: No Change  Disoriented to place, situation. Up with lift. VSS on RA. Denies pain, right arm in sling with decreased swelling. Tele A-fib, RVR. On Diltiazem gtt 5 mL/hr, HR fluctuating 80-120s. Started PO Diltiazem this AM. Echo done today, see results. PT/OT. SW following for bed placement at [facility] - possible d/c tomorrow. Continue to monitor.

## 2017-12-21 NOTE — PLAN OF CARE
Problem: Patient Care Overview  Goal: Plan of Care/Patient Progress Review  OT: Pt busy with  Nursing cares during attempted OT session

## 2017-12-22 NOTE — PLAN OF CARE
Problem: Patient Care Overview  Goal: Plan of Care/Patient Progress Review  Outcome: No Change  VS stable. Heart rates elevated. Diminished LS. +2 edema to right hand. Complained of pain in right arm and pain med's given. Tele is a fib RVR. Slept on and off throughout the night.

## 2017-12-22 NOTE — PROGRESS NOTES
SWS  Met with brii Figueroa and .  They would like pt to go back to [facility] with [facility].  Spoke to Jovita at [facility] who states that, as long as pt can feed self, they can manage her cares.  They will have staff here to do an assessment on Tuesday 12/26 at 8 am to assess.  P:  SW following    Update-  SW from [facility] will be out at 9 AM on Tuesday 12/26 to meet with family and get paperwork signed.  Their fax number is [phone]

## 2017-12-22 NOTE — PROGRESS NOTES
SPIRITUAL HEALTH SERVICES Progress Note  [facility] Med[facility]urg 3rd floor     visit per f/u plan of care. Chuyita was resting comfortably with her eyes closed, wakened easily to voice. Chuyita shared that she wanted to rest but would appreciate it if I sat with her for awhile. Sat in silence with Chuyita as she quickly fell back asleep and remained with her for 10 minutes, silently praying based on previous conversation with her.  Chuyita remained calm and asleep during visit.   Will f/u again early next week if Chuyita remains in the hospital. I and the other chaplains remain available per pt/staff/family need or request.     NESHA Galo.  Staff    Pager #[phone]

## 2017-12-22 NOTE — PROGRESS NOTES
"[facility]  Hospitalist Progress Note  Edgar Braga MD 12/22/2017    Reason for Stay (Diagnosis): Fall, possibly associated with UTI         Assessment and Plan:      Summary of Stay: Chuyita Gayle is a 85 year old female w/ hx of chronic pain syndrome, a-fib on coumadin, anxiety, fibromyalgia, HTN, asthma who presented on 12/13/2017 following a fall.  She reportedly has been requiring more and more assistance at her intermediate even with transfers to the point where per her granddaughter she essentially was requiring SNF type care already and was needing heavy assist of two or a lift (which she often refused to use and was basically just bed-bound/chair bound).  She reportedly fell during a transfer and she complained of right wrist and elbow pain and in the ED was found to have evidence of UTI with early Sepsis.  I note that in the ED, the patient was found to have a mildly elevated WBC, but no left shift. In addition, she had LGT and UA is suggestive of infection, though LE is \"small\". Ketones were positive, possibly related to decreased oral intake.  She was started on IV ceftriaxone and admitted for further care.  Following admission Chuyita was noted to have fairly profound diffuse pain out of proportion to what may have been expected based on her fall.  Based on markedly elevated inflammatory markers and fairly diffuse MSK pain with report of prior hx of  \"inflammatory arthritis\" she was empirically started on steroids for suspected PMR (initially solumedrol 1 mg/kg once IV, then Prednisone 20 mg po daily w/ taper).  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    She has been very slow to improve here in spite of completing an antibiotic course and initially improved pain.  She has been anxious and has been requiring a lift for transfers.     Earlier in her stay I had a long talk with her granddaughter, , who confirms that Chuyita " has been gradually failing for the past couple of years and was to the point of requiring lift transfers the past couple of months.  The overall goal for her is comfort/minimizing suffering.  I discussed her hospital course in detail and appropriate follow up.    On 12/20 she was nearing discharge but developed a-fib w/ RVR which required diltiazem drip (normally on atenolol).  Pain and anxiety have continued to be issues.  Palliative care held a conference with family on 12/22 with the ultimately decision to transition to comfort cares and enroll in hospice.  We now have made this transition here in the hospital and are awaiting disposition.      Problem List:   1. Severe diffuse pain. ?PMR. Apparently much more comfortable with much less generalized pain and though still with focal complaints of right hand, wrist and shoulder pain.  ?component from falls as well.  --continue prednisone 15 mg daily for now, slowly taper off over the next 8 weeks given marked improvement in pain complaints on this med.  --Palliative care consulted to help family to make plans as above.  Now comfort care.    2. Right distal radius avulsion fracture. CT scan of the right hand completed 12/16 shows no new or additional fractures.     3. Right shoulder pain, present from well before the hospitalization.     4. Possible UTI, no specific signs of sepsis in my opinion. Completed Ceftriaxone. (Aerococcus growing on the first day), pan-sensitive (including ceftriaxone).     5. Supratherapeutic INR on admission. Coumadin held due to demonstrated very high fall risk.  Rechecked and now normalized.  Risks of warfarin likely exceed benefits in the short term and after discussion w/ family they've decided to stop this indefinitely.    6. Atrial fibrillation for which she was chronically anticoagulated, recent issues w/ a-fib w/ RVR. With her tendency to fall planned to discontinue Warfarin.  Now comfort care so per discussion w/ family will stop  "dilt drip and further monitoring/workup with the understanding that she could decompensate significantly.  --continue baseline atenolol 50 mg but hold off on more aggressive measures.    7.  Severe malnutrition    8.  Severe chronic pain syndrome    DVT Prophylaxis: none indicated for comfort.  Code Status: DNR/DNI and comfort cares.  Discharge Dispo: back to [facility] w/ Hospice.  May not be able to happen until 12/26 due to holidays.  SW following.        Interval History (Subjective):      Awake, calm, still w/ issues w/ a-fib w/ RVR.  No clear trigger noted.  Met w/ family again, palliative care held conference  Transitioned to comfort measures  I confirmed all of these wishes with Stacey DILL) personally as well after their meeting.  She clearly seems to be acting in Chuyita's best interest/wishes and has put a lot of thought into this and discussed at length with Chuyita over the past years.                    Physical Exam:      Last Vital Signs:  /85 (BP Location: Left arm)  Pulse 75  Temp 98.4  F (36.9  C) (Oral)  Resp 12  Ht 1.626 m (5' 4\")  Wt 65.2 kg (143 lb 11.2 oz)  SpO2 94%  BMI 24.67 kg/m2    I/O last 3 completed shifts:  In: 270 [P.O.:230; I.V.:40]  Out: -     Constitutional: Awake, alert, comfortable. Ongoing tenderness over the right hand and wrist, but otherwise minimal facial or other muscular tenderness.   Respiratory: Clear to auscultation bilaterally, no crackles or wheezing   cardiac Tachycardic, irregular.   Abdomen: Normal bowel sounds, soft, non-distended, non-tender   Skin: No rashes, no cyanosis, dry to touch   Neuro: Alert and appears coherent, oriented to situation.    Extremities: Right hand is quite edematous with erythema over the third MCP. Bilat LE edema.   Other(s):        All other systems: Negative          Medications:      All current medications were reviewed with changes reflected in problem list.         Data:      All new lab and imaging data was reviewed. "   Labs/Imaging:  Results for orders placed or performed during the hospital encounter of 12/13/17 (from the past 24 hour(s))   Lactic acid level STAT   Result Value Ref Range    Lactic Acid 0.9 0.7 - 2.0 mmol/L

## 2017-12-22 NOTE — PLAN OF CARE
Problem: Patient Care Overview  Goal: Plan of Care/Patient Progress Review  OT: Per discussion with treatment team, including physical therapist,  requesting to discontinue orders for OT, looking into return to [facility] on hospice with lift equipment available. Will complete orders at this time.  Please reorder if appropriate     Occupational Therapy Discharge Summary    Reason for therapy discharge:    Patient/family request discontinuation of services.    Progress towards therapy goal(s). See goals on Care Plan in [facility] electronic health record for goal details.  Goals not met.  Barriers to achieving goals:   limited tolerance for therapy.

## 2017-12-22 NOTE — PLAN OF CARE
Problem: Patient Care Overview  Goal: Plan of Care/Patient Progress Review  Outcome: No Change  VSS, Alert, disoriented to time, forgetful.  Assist x2/lift.  Pt up with lift to BSC.  LS-dim, crackles.  Pt 94% on RA.  Tele dc'd, pt had been in afib RVR.  Family met with palliative care, pt now on comfort cares, dilt gtt discontinued.  RUE in sling, voltaren gel applied with good relief, has not required PO pain meds.  Pt up to chair for meals.

## 2017-12-22 NOTE — PLAN OF CARE
Problem: Patient Care Overview  Goal: Plan of Care/Patient Progress Review  PT: Per Dr. Amato, family would like to discontinue orders for PT, looking into return to [facility] on hospice with lift equipment available. Will complete orders at this time.     Physical Therapy Discharge Summary    Reason for therapy discharge:    Patient/family request discontinuation of services.    Progress towards therapy goal(s). See goals on Care Plan in [facility] electronic health record for goal details.  Goals not met.  Barriers to achieving goals:   limited tolerance for therapy.    Therapy recommendation(s):    Continue home exercise program.As goals of care allow.

## 2017-12-22 NOTE — PROGRESS NOTES
NUTRITION BRIEF NOTE  RD following for malnutrition.   Upon chart review, comfort care election. RD to sign off. Please page/consult as needed.     Rere Gutierrez RDN, LD, [facility]  3rd floor/ICU: [phone]  All other floors: [phone]  Weekend/holiday: [phone]  Office: [phone]

## 2017-12-22 NOTE — PLAN OF CARE
Problem: Patient Care Overview  Goal: Plan of Care/Patient Progress Review  Outcome: Improving  Dilt po increased to 60 mg. Dilt gtt increased to 10mg. Hr now afib from . 's. Tolerating well. A&Ox4 forgetful. RUE in a sling. Mild pain. Up to chair for meals. Needs some assist with eating as pt is right handed. k 3.6. Replaced.   Pt having frequent pauses. Longest 1.9 per tele tech. dilt gtt infusing at 10mg/hr. Did decrease gtt to 5mg/hr. Hr from  afib

## 2017-12-22 NOTE — PROGRESS NOTES
"[facility]  Palliative Care Progress Note  Text Page    Met with the patients grand-daughters/next-of-kin Stacey Shelljoescooby and Taylor Carty in the third floor conference room. They had met with Chuyita last evening and she had made it clear that she did not want aggressive intervention and would want to return to [facility] with the support of hospice. They are quite aware of the hospice benefit as the father used the benefit for one day when he passed from \"alcoholism\" and they cared for their uncle (the patient's son) when he was on hospice for cancer. They had also research hospice with [facility] and [facility] and found that Chuyita was not quite ready for hospice at that time. Chuyita's PCP (Dr. Flores) had recommended that she start hospice due to her worsening pain. Stacey and  state that Chuyita would not want to continue with therapy, and lab draws. We discussed their grandmother's recent problems with Atrial Fibrillation and they request a comfort plan. Appreciate assistance of  STACI Singh regarding the family's request to return to [facility] on hospice care. The patient will not be able to return to that facility until Tuesday due to the [place] holiday on Monday.            Assessment & Plan       1.  Dementia - patient does not demonstrate medical capacity. Family is assisting in care plan.        2.  Pain - Reasonable try Amitriptyline (Elavil) at bedtime for comfort        3.  Goals of Care: DNR/DNI - Comfort care. Next-of-kin/granddaughters Stacey Boothe and Taylor Carty request that the patient return to [facility] for hospice care.       Vandana Hagen MS, RN, CNS, APRN, ACHPN, FAACVPR  Pain and Palliative Care  Pager [phone]  Office [phone]     Time Spent on this Encounter   I spent from 10:40 AM until 11:50 AM in assessment of the patient and discussion with the patient and family. Another 20 minutes in review of chart, documentation and discussion with " the health care team.    Interval History   Chart reviewed    Palliative Symptom Review (0=no symptom/no concern, 1=mild, 2=moderate, 3=severe):      Pain: 2-moderate      Fatigue: 2-moderate      Nausea: 0-none      Constipation: 0-none      Diarrhea: 0-none      Depressive Symptoms: 2-moderate      Anxiety: 1-mild      Drowsiness: 0-none      Poor Appetite: 0-none      Shortness of Breath: 0-none      Insomnia: 0-none        Physical Exam   Temp:  [96  F (35.6  C)-98.4  F (36.9  C)] 98.4  F (36.9  C)  Heart Rate:  [] 116  Resp:  [12-24] 12  BP: (107-147)/(58-89) 127/70  SpO2:  [91 %-96 %] 94 %  143 lbs 11.2 oz  GEN:  Alert, oriented to self and situation, appears comfortable, NAD.  HEENT:  Normocephalic/atraumatic, no scleral icterus, no nasal discharge, mouth moist.  RESP:  Symmetric chest rise on inhalation noted.  Normal respiratory effort.  PAIN BEHAVIOR: Cooperative  Psych:  Normal affect.  Calm, cooperative, conversant appropriately.    Medications     diltiazem (CARDIZEM) infusion ADULT 5 mg/hr (12/22/17 8288)     - MEDICATION INSTRUCTIONS -         diltiazem  90 mg Oral Q6H JORGE     amitriptyline  50 mg Oral At Bedtime     multivitamin, therapeutic with minerals  1 tablet Oral Daily     diclofenac  2 g Transdermal 4x Daily     predniSONE  15 mg Oral Daily     fluticasone-vilanterol  1 puff Inhalation Daily     gabapentin (NEURONTIN) tablet 600 mg  600 mg Oral At Bedtime     latanoprost  1 drop Both Eyes At Bedtime     omeprazole (priLOSEC) CR capsule 20 mg  20 mg Oral QAM AC     senna-docusate  1 tablet Oral Daily       Data   Results for orders placed or performed during the hospital encounter of 12/13/17 (from the past 24 hour(s))   Lactic acid level STAT   Result Value Ref Range    Lactic Acid 0.9 0.7 - 2.0 mmol/L

## 2017-12-23 NOTE — PLAN OF CARE
Problem: Patient Care Overview  Goal: Plan of Care/Patient Progress Review  Outcome: No Change  Disoriented to time, place. Up with lift, Q2H turns. C/o right shoulder pain, PRN Oxy given X1 along with Voltaren gel. On comfort cares - no VS. Increased appetite this evening, up to chair for meals. Plan to discharge to [facility] on hospice, SW following.

## 2017-12-23 NOTE — PROGRESS NOTES
"[facility]  Hospitalist Progress Note  Dinah Gilman MD, MD 12/23/2017    Reason for Stay (Diagnosis): Fall, possibly associated with UTI         Assessment and Plan:      Summary of Stay: Chuyita Gayle is a 85 year old female w/ hx of chronic pain syndrome, a-fib on coumadin, anxiety, fibromyalgia, HTN, asthma who presented on 12/13/2017 following a fall.  She reportedly has been requiring more and more assistance [facility] even with transfers to the point where per her granddaughter she essentially was requiring SNF type care already and was needing heavy assist of two or a lift (which she often refused to use and was basically just bed-bound/chair bound).  She reportedly fell during a transfer and she complained of right wrist and elbow pain and in the ED was found to have evidence of UTI with early Sepsis.  I note that in the ED, the patient was found to have a mildly elevated WBC, but no left shift. In addition, she had LGT and UA is suggestive of infection, though LE is \"small\". Ketones were positive, possibly related to decreased oral intake.  She was started on IV ceftriaxone and admitted for further care.  Following admission Chuyita was noted to have fairly profound diffuse pain out of proportion to what may have been expected based on her fall.  Based on markedly elevated inflammatory markers and fairly diffuse MSK pain with report of prior hx of  \"inflammatory arthritis\" she was empirically started on steroids for suspected PMR (initially solumedrol 1 mg/kg once IV, then Prednisone 20 mg po daily w/ taper).  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    She has been very slow to improve here in spite of completing an antibiotic course and initially improved pain.  She has been anxious and has been requiring a lift for transfers.     Earlier in her stay I had a long talk with her granddaughter, , who confirms that " Chuyita has been gradually failing for the past couple of years and was to the point of requiring lift transfers the past couple of months.  The overall goal for her is comfort/minimizing suffering.  I discussed her hospital course in detail and appropriate follow up.    On 12/20 patient developed A. fib RVR and was started on diltiazem drip.  This was discontinued after a year status post change it to comfort care. The plan is to discharge her to hospice.   following.       Problem List:   1. Severe diffuse pain.  Patient has multiple complaints in regards to the pain.  She does appear confused today.  --continue prednisone 15 mg daily for now, slowly taper off over the next 8 weeks given marked improvement in pain complaints on this med.  --Patient's status was changed to comfort care/hospice on 12/22 after after discussion with his family.    2. Right distal radius avulsion fracture. CT scan of the right hand completed 12/16 shows no new or additional fractures.     3. Right shoulder pain, present from well before the hospitalization.     4. Possible UTI, no specific signs of sepsis in my opinion. Completed Ceftriaxone. (Aerococcus growing on the first day), pan-sensitive (including ceftriaxone).     5. Supratherapeutic INR on admission. Coumadin held due to demonstrated very high fall risk.  Rechecked and now normalized.  Risks of warfarin likely exceed benefits in the short term and after discussion w/ family they've decided to stop this indefinitely.    6. Atrial fibrillation for which she was chronically anticoagulated, recent issues w/ a-fib w/ RVR. With her tendency to fall planned to discontinue Warfarin. Now comfort care so per discussion w/ family will stop dilt drip and further monitoring/workup with the understanding that she could decompensate significantly.  --continue baseline atenolol 50 mg but hold off on more aggressive measures.    7.  Severe malnutrition    8.  Severe chronic pain  "syndrome    DVT Prophylaxis: none indicated for comfort.  Code Status: DNR/DNI and comfort cares.  [facility].  May not be able to happen until 12/26 due to holidays.  SW following.        Interval History (Subjective):      Patient seen and examined.  She looks sleepy.  There is no new complaints.  She has no nausea or vomiting.  She denies chest pain, cough, shortness of breath.  My colleague discussed with the family yesterday and her status was changed to comfort care.                Physical Exam:      Last Vital Signs:  /85 (BP Location: Left arm)  Pulse 75  Temp 98.4  F (36.9  C) (Oral)  Resp 12  Ht 1.626 m (5' 4\")  Wt 65.2 kg (143 lb 11.2 oz)  SpO2 92%  BMI 24.67 kg/m2    I/O last 3 completed shifts:  In: 580 [P.O.:580]  Out: 100 [Urine:100]    Constitutional: Awake, alert, comfortable. Ongoing tenderness over the right hand and wrist, but otherwise minimal facial or other muscular tenderness.   Respiratory: Clear to auscultation bilaterally, no crackles or wheezing   cardiac Tachycardic, irregular.   Abdomen: Normal bowel sounds, soft, non-distended, non-tender   Skin: No rashes, no cyanosis, dry to touch   Neuro: Alert and appears coherent, oriented to situation.    Extremities: Right hand is quite edematous with erythema over the third MCP. Bilat LE edema.   Other(s):        All other systems: Negative          Medications:      All current medications were reviewed with changes reflected in problem list.         Data:      All new lab and imaging data was reviewed.   Labs/Imaging:  No results found for this or any previous visit (from the past 24 hour(s)).    "

## 2017-12-23 NOTE — PLAN OF CARE
Problem: Patient Care Overview  Goal: Plan of Care/Patient Progress Review  Outcome: No Change  Orientation: Alert w/cares, disoriented xtime and situation.   VSS. On comfort cares. No vital signs taken  IVF: SL  Tele:    LS:    GI:No BM. Denies N/V.    : Adequate urine output.    Skin: WDL, Skin otherwise intact.   Activity: assist of 2 w/mechaniocal lift. Restless and agitated first part of shift, PRN Oxy given for comfort, Pt slept comfortably throughout shift.   Pain: c/o pain. PRN interventions utilized. Pt sleeping.   DC Plan: Continue with current cares.

## 2017-12-23 NOTE — PLAN OF CARE
"Problem: Patient Care Overview  Goal: Plan of Care/Patient Progress Review  Outcome: No Change  Pt was sleeping most of shift. Pt yelling out around 1300, pt was confused crying out, and anxious. Requested to speak to granddaughter. Pt had not voided all day after attempts to put her on the commode. Bladder scanned resulted in 999 ml of urine. Requested straight cath and prn anti-anxiety. After straight cath pt returned back to baseline so no prn Ativan given. Family visited. Pt in better spirits after family left. Pt had a large loose BM on commode at 1500, no void. Wound on buttock intact and blanchable. No prn pain meds needed. Denied pain and SOB Vitals: /85 (BP Location: Left arm)  Pulse 75  Temp 98.4  F (36.9  C) (Oral)  Resp 12  Ht 1.626 m (5' 4\")  Wt 65.2 kg (143 lb 11.2 oz)  SpO2 92%  BMI 24.67 kg/m2       "

## 2017-12-24 NOTE — PLAN OF CARE
Pt. A&O. VSS, pain to R) shoulder, in sling. PRN Oxy 5 mg given x1, scheduled Voltaren gel applied. Transfers with assist of x2, lift. Pt. Up to chair for 2 hrs, tolerated well. Took evening medication. Voiding well tonight, no retention.

## 2017-12-24 NOTE — PROGRESS NOTES
"[facility]  Hospitalist Progress Note  Dinah Gilman MD, MD 12/24/2017    Reason for Stay (Diagnosis): Fall, possibly associated with UTI         Assessment and Plan:      Summary of Stay: Chuyita Gayle is a 85 year old female w/ hx of chronic pain syndrome, a-fib on coumadin, anxiety, fibromyalgia, HTN, asthma who presented on 12/13/2017 following a fall.  She reportedly has been requiring more and more assistance at her assisted even with transfers to the point where per her granddaughter she essentially was requiring SNF type care already and was needing heavy assist of two or a lift (which she often refused to use and was basically just bed-bound/chair bound).  She reportedly fell during a transfer and she complained of right wrist and elbow pain and in the ED was found to have evidence of UTI with early Sepsis.  I note that in the ED, the patient was found to have a mildly elevated WBC, but no left shift. In addition, she had LGT and UA is suggestive of infection, though LE is \"small\". Ketones were positive, possibly related to decreased oral intake.  She was started on IV ceftriaxone and admitted for further care.  Following admission Chuyita was noted to have fairly profound diffuse pain out of proportion to what may have been expected based on her fall.  Based on markedly elevated inflammatory markers and fairly diffuse MSK pain with report of prior hx of  \"inflammatory arthritis\" she was empirically started on steroids for suspected PMR (initially solumedrol 1 mg/kg once IV, then Prednisone 20 mg po daily w/ taper).  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  She has been declining during her hospital course. The overall goal for her is comfort/minimizing suffering.  On 12/20 patient developed A. fib RVR and was started on diltiazem drip.  This was discontinued after a year status post change it to comfort care. The plan is to " discharge her to hospice.   following.       Problem List:   1. Severe diffuse pain.  Patient has been having multiple complaints in regards to her pain.  --continue prednisone 15 mg daily for now, slowly taper off over the next 8 weeks given marked improvement in pain complaints on this med.  --Patient's status was changed to comfort care/hospice on 12/22 after after discussion with his family.    2. Right distal radius avulsion fracture. CT scan of the right hand completed 12/16 shows no new or additional fractures.     3. Right shoulder pain, present from well before the hospitalization.     4. Possible UTI, no specific signs of sepsis in my opinion. Completed Ceftriaxone. (Aerococcus growing on the first day), pan-sensitive (including ceftriaxone).     5. Supratherapeutic INR on admission.  Coumadin was discontinued during this admission.  Risks of warfarin likely exceed benefits in the short term and after discussion w/ family they've decided to stop this indefinitely.    6. Atrial fibrillation for which she was chronically anticoagulated, recent issues w/ a-fib w/ RVR. With her tendency to fall planned to discontinue Warfarin. Now comfort care so per discussion w/ family will stop dilt drip and further monitoring/workup with the understanding that she could decompensate significantly.  --continue baseline atenolol 50 mg but hold off on more aggressive measures.    7.  Severe malnutrition    8.  Severe chronic pain syndrome    DVT Prophylaxis: none indicated for comfort.  Code Status: DNR/DNI and comfort cares.  Discharge Dispo: back to [facility] w/ Hospice.  Likely discharge on 12/26 due to holidays.  SW following for discharge planning.        Interval History (Subjective):      Patient seen and examined.  She is sleepy.  No new issues other than pain complaints.  No fever.                Physical Exam:      Last Vital Signs:  /70  Pulse 75  Temp 98.4  F (36.9  C) (Oral)  Resp 12  Ht  "1.626 m (5' 4\")  Wt 65.2 kg (143 lb 11.2 oz)  SpO2 92%  BMI 24.67 kg/m2    I/O last 3 completed shifts:  In: 1310 [P.O.:1310]  Out: 1000 [Urine:1000]    Constitutional: Awake, alert, comfortable. Ongoing tenderness over the right hand and wrist, but otherwise minimal facial or other muscular tenderness.   Respiratory: Clear to auscultation bilaterally, no crackles or wheezing   cardiac Tachycardic, irregular.   Abdomen: Normal bowel sounds, soft, non-distended, non-tender   Skin: No rashes, no cyanosis, dry to touch   Neuro: Alert and appears coherent, oriented to situation.    Extremities: Right hand is quite edematous with erythema over the third MCP. Bilat LE edema.   Other(s):        All other systems: Negative          Medications:      All current medications were reviewed with changes reflected in problem list.         Data:      All new lab and imaging data was reviewed.   Labs/Imaging:  No results found for this or any previous visit (from the past 24 hour(s)).    "

## 2017-12-24 NOTE — PLAN OF CARE
Problem: Patient Care Overview  Goal: Plan of Care/Patient Progress Review  Outcome: No Change  Orientation: Alert w/cares, A/O x4   VSS. On comfort cares. No vital signs taken  IVF: SL  Tele:    LS:    GI:No BM. Denies N/V.    :  Pt placed on commode during report at beginning of shift, voided small amt.  Pt requested to sit on commode again a short time later. Bladder scan before void 449 ml, pt voided and post scan was 358 ml. Pt refused st cath at this time. Will scan again when she wakes to void.    Skin: WDL, Skin otherwise intact.   Activity: assist of 2 w/mechaniocal lift. Restless and agitated first part of shift, PRN Oxy given for comfort, Pt slept comfortably throughout shift. q2h turns  Pain: c/o pain. PRN interventions utilized. Pt sleeping.   DC Plan: Continue with current cares.

## 2017-12-24 NOTE — PLAN OF CARE
Problem: Patient Care Overview  Goal: Plan of Care/Patient Progress Review  Outcome: No Change  A&O to self. Total lift. Pt forgetful at times. Cardio WNL. Lungs dim. Pt has had no BM per shift. Pt bladder scanned for 750 cc and put out 700 after straight cath. Plan is to keep pt pain managed. Pt is on strict comfort care. Head-to-toe refused. Repositioned q two hours.

## 2017-12-24 NOTE — PLAN OF CARE
Problem: Patient Care Overview  Goal: Plan of Care/Patient Progress Review  Outcome: No Change  A&O to self. Total lift. Chairfast. HEENT WNL ex glasses. No tele. Lungs dim. No BM. Straight cath and initiated spring catheter. Plan is comfort cares and possible dc on Tuesday to Assisted living.

## 2017-12-25 NOTE — PLAN OF CARE
Problem: Patient Care Overview  Goal: Plan of Care/Patient Progress Review  Outcome: No Change  Disoriented to time. Comfort cares, no VS. Turned/repositioned Q2H. PRN Oxycodone and Voltaren gel for pain, right arm sling in place. Hartman for retention. Up to chair for meals with lift. Plan to discharge to [facility] on hospice possibly tomorrow.

## 2017-12-25 NOTE — PLAN OF CARE
Problem: Patient Care Overview  Goal: Plan of Care/Patient Progress Review  Outcome: No Change  RN - Pt remains on comfort care. Minimal assessment and vitals done due to pt status. Pt forgetful, but appearing alert and oriented. Pt c/o generalized pain. Better controlled with newly ordered PRN options. Pt receiving as ordered and appearing effective. Up to chair for approx 1 hour via lift. Indwelling spring remains with adequate output. Pt tolerating PO intake without nausea. R arm remains in sling. Pt awaiting placement for hospice care. Family contacted staff and updated on POC.

## 2017-12-25 NOTE — PROGRESS NOTES
"[facility]  Hospitalist Progress Note  Dinah Gilman MD, MD 12/25/2017    Reason for Stay (Diagnosis): Fall, possibly associated with UTI         Assessment and Plan:      Summary of Stay: Chuyita Gayle is a 85 year old female w/ hx of chronic pain syndrome, a-fib on coumadin, anxiety, fibromyalgia, HTN, asthma who presented on 12/13/2017 following a fall.  She reportedly has been requiring more and more assistance at her [facility] even with transfers to the point where per her granddaughter she essentially was requiring SNF type care already and was needing heavy assist of two or a lift (which she often refused to use and was basically just bed-bound/chair bound).  She reportedly fell during a transfer and she complained of right wrist and elbow pain and in the ED was found to have evidence of UTI with early Sepsis.  I note that in the ED, the patient was found to have a mildly elevated WBC, but no left shift. In addition, she had LGT and UA is suggestive of infection, though LE is \"small\". Ketones were positive, possibly related to decreased oral intake.  She was started on IV ceftriaxone and admitted for further care.  Following admission Chuyita was noted to have fairly profound diffuse pain out of proportion to what may have been expected based on her fall.  Based on markedly elevated inflammatory markers and fairly diffuse MSK pain with report of prior hx of  \"inflammatory arthritis\" she was empirically started on steroids for suspected PMR (initially solumedrol 1 mg/kg once IV, then Prednisone 20 mg po daily w/ taper).  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  She has been declining during her hospital course. The overall goal for her is comfort/minimizing suffering.  On 12/20 patient developed A. fib RVR and was started on diltiazem drip.  This was discontinued after a year status post change it to comfort care. The plan is to " discharge her to hospice.   following.       Problem List:   1. Severe diffuse pain.  Patient has been having multiple complaints in regards to her pain.  --continue prednisone 15 mg daily for now, slowly taper off over the next 8 weeks given marked improvement in pain complaints on this med.  --Patient's status was changed to comfort care/hospice on 12/22 after after discussion with his family.    2. Right distal radius avulsion fracture. CT scan of the right hand completed 12/16 shows no new or additional fractures.     3. Right shoulder pain, present from well before the hospitalization.     4. Possible UTI, no specific signs of sepsis in my opinion. Completed Ceftriaxone. (Aerococcus growing on the first day), pan-sensitive (including ceftriaxone).     5. Supratherapeutic INR on admission.  Coumadin was discontinued during this admission.  Risks of warfarin likely exceed benefits in the short term and after discussion w/ family they've decided to stop this indefinitely.    6. Atrial fibrillation for which she was chronically anticoagulated, recent issues w/ a-fib w/ RVR. With her tendency to fall planned to discontinue Warfarin. Now comfort care so per discussion w/ family will stop dilt drip and further monitoring/workup with the understanding that she could decompensate significantly.  --continue baseline atenolol 50 mg but hold off on more aggressive measures.    7.  Severe malnutrition    8.  Severe chronic pain syndrome    DVT Prophylaxis: none indicated for comfort.  Code Status: DNR/DNI and comfort cares.  Discharge Dispo: back to [facility] w/ Hospice.  Likely discharge on 12/26 due to holidays.  SW following for discharge planning.        Interval History (Subjective):      Patient seen and examined.  She is sleepy.  No new issues other than pain complaints.  No fever.                Physical Exam:      Last Vital Signs:  /70  Pulse 75  Temp 98.4  F (36.9  C) (Oral)  Resp 12  Ht  "1.626 m (5' 4\")  Wt 65.2 kg (143 lb 11.2 oz)  SpO2 97%  BMI 24.67 kg/m2    I/O last 3 completed shifts:  In: -   Out: 3169 [Urine:3169]    Constitutional: Awake, alert, comfortable. Ongoing tenderness over the right hand and wrist, but otherwise minimal facial or other muscular tenderness.   Respiratory: Clear to auscultation bilaterally, no crackles or wheezing   cardiac Tachycardic, irregular.   Abdomen: Normal bowel sounds, soft, non-distended, non-tender   Skin: No rashes, no cyanosis, dry to touch   Neuro: Alert and appears coherent, oriented to situation.    Extremities: Right hand is quite edematous with erythema over the third MCP. Bilat LE edema.   Other(s):        All other systems: Negative          Medications:      All current medications were reviewed with changes reflected in problem list.         Data:      All new lab and imaging data was reviewed.   Labs/Imaging:  No results found for this or any previous visit (from the past 24 hour(s)).    "

## 2017-12-25 NOTE — PLAN OF CARE
Problem: Urinary Tract Infection (Adult)  Goal: Signs and Symptoms of Listed Potential Problems Will be Absent, Minimized or Managed (Urinary Tract Infection)  Signs and symptoms of listed potential problems will be absent, minimized or managed by discharge/transition of care (reference Urinary Tract Infection (Adult) CPG).   Outcome: No Change  Right arm in sling. PRN Oxycodone given for right arm pain with decrease or relief of pain. Up to chair x1. Hartman in place. Comfort cares.

## 2017-12-25 NOTE — PLAN OF CARE
Problem: Patient Care Overview  Goal: Plan of Care/Patient Progress Review  Outcome: No Change  Orientation: sleeping, arouses to voice stimuli   VSS: Minimal assessment and vitals done due to pt status.   GI: BS active. No BM.    : Indwelling spring remains with adequate output. Clear yellow.    Skin:redness noted, Skin otherwise intact. q2h turns  Activity: SBA to assist of 1. Pt slept comfortably throughout shift.   Pain: no c/o pain. Better controlled with newly ordered PRN options, No PRN interventions utilized. Pt sleeping.   DC Plan: Continue with current cares.

## 2017-12-26 NOTE — DISCHARGE SUMMARY
[facility]    Discharge Summary  Hospitalist    Date of Admission:  12/13/2017  Date of Discharge:  12/26/2017  Discharging Provider: Dinah Gilman MD  Date of Service (when I saw the patient): 12/26/17    Discharge Diagnoses      1. Severe diffuse pain     2. Right distal radius avulsion fracture. CT scan of the right hand completed 12/16 shows no new or additional fractures.     3. Right shoulder pain, present from well before the hospitalization.     4. Possible UTI, no specific signs of sepsis in my opinion. Completed Ceftriaxone     5. Supratherapeutic INR on admission.  Coumadin was discontinued during this admission    6. Atrial fibrillation for which she was chronically anticoagulated, recent issues w/ a-fib w/ RVR     7.  Severe malnutrition     8.  Severe chronic pain syndrome       History of Present Illness   Chuyita Gayle is an 85 year old female who presented with with complex medical history including chronic pain syndrome, atrial fibrillation, anxiety, hypertension, fibromyalgia was admitted after a fall.  She has significant pain.Her status was changed to comfort care during hospital course.  Please see H&P for details.    Hospital Course   Summary of Stay: Chuyita Gayle is a 85 year old female w/ hx of chronic pain syndrome, a-fib on coumadin, anxiety, fibromyalgia, HTN, asthma who presented on 12/13/2017 following a fall. She reportedly has been requiring more and more assistance at her KATE even with transfers to the point where per her granddaughter she essentially was requiring SNF type care already and was needing heavy assist of two or a lift (which she often refused to use and was basically just bed-bound/chair bound).  She reportedly fell during a transfer and she complained of right wrist and elbow pain and in the ED was found to have evidence of UTI with early Sepsis.  I note that in the ED, the patient was found to have a mildly elevated WBC, but no left shift.  "In addition, she had LGT and UA is suggestive of infection, though LE is \"small\". Ketones were positive, possibly related to decreased oral intake.  She was started on IV ceftriaxone and admitted for further care.  Following admission Chuyita was noted to have fairly profound diffuse pain out of proportion to what may have been expected based on her fall.  Based on markedly elevated inflammatory markers and fairly diffuse MSK pain with report of prior hx of  \"inflammatory arthritis\" she was empirically started on steroids for suspected PMR (initially solumedrol 1 mg/kg once IV, then Prednisone 20 mg po daily w/ taper).  Patient continued to have generalized pain with weakness.  She has been declining during her hospital course. The overall goal for her is comfort/minimizing suffering.  On 12/20 patient developed A. fib RVR and was started on diltiazem drip. This was discontinued after hair status post change to comfort care. The plan is to discharge her to assisted living facility with hospice today.      Problem List:   1. Severe diffuse pain.  Patient has been having multiple complaints in regards to her pain.  --Patient's status was changed to comfort care/hospice on 12/22 after after discussion with his family.  Discharge pain medications have been reviewed by pain/palliative care team. Discharge plan was discussed with her family at bedside.     2. Right distal radius avulsion fracture. CT scan of the right hand completed 12/16 shows no new or additional fractures.      3. Right shoulder pain, present from well before the hospitalization.      4. Possible UTI, no specific signs of sepsis in my opinion. Completed Ceftriaxone. (Aerococcus growing on the first day), pan-sensitive (including ceftriaxone).      5. Supratherapeutic INR on admission.  Coumadin was discontinued during this admission.  Risks of warfarin likely exceed benefits in the short term and after discussion w/ family they've decided to stop this " indefinitely.     6. Atrial fibrillation for which she was chronically anticoagulated, recent issues w/ a-fib w/ RVR. With her tendency to fall planned to discontinue Warfarin. Now comfort care so per discussion w/ family will stop dilt drip and further monitoring/workup with the understanding that she could decompensate significantly.  --continue baseline atenolol 50 mg but hold off on more aggressive measures.     7.  Severe malnutrition     8.  Severe chronic pain syndrome  Code Status: DNR/DNI and comfort cares.  Discharge Dispo: Patient was discharged back to [facility] w/ Hosp[facility] care.     Significant Results and Procedures   Results for orders placed or performed during the hospital encounter of 12/13/17   CT Head w/o Contrast    Narrative    CT HEAD WITHOUT CONTRAST  12/13/2017 7:08 PM    HISTORY: Fall, altered mental status.      TECHNIQUE: Scans were obtained through the head without IV contrast.  Radiation dose for this scan was reduced using automated exposure  control, adjustment of the mA and/or kV according to patient size, or  iterative reconstruction technique.    COMPARISON: 2/19/2017     FINDINGS: Moderate atrophy. 1 cm area of old infarction right frontal  periventricular region extending into the right basal ganglia.  Moderate chronic white matter disease likely small vessel ischemic  change. No hemorrhage, mass lesion, or focal area of acute infarction  identified. Paranasal sinuses are normal. No bony abnormality. No  change since prior head CT.      Impression    IMPRESSION:   1. Atrophy and chronic white matter disease.  2. 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  3. No interval change.    KIM DALE MD   Shoulder XR, G/E 3 views, right    Narrative    SHOULDER RIGHT THREE OR MORE VIEWS   12/13/2017 7:30 PM     HISTORY: Arm pain.     COMPARISON: None.    FINDINGS: Advanced chronic degenerative changes right shoulder.  Associated hypertrophic changes and cystic degenerative  changes within  the subchondral region of the humeral head. No fracture or acute  appearing abnormality. Degenerative changes right AC joint with slight  widening of the AC joint probably chronic.      Impression    IMPRESSION: Advanced degenerative changes right shoulder and mild  degenerative changes right AC joint. Nothing acute.    KIM DALE MD   Elbow XR, G/E 3 views, right    Narrative    ELBOW RIGHT THREE OR MORE VIEWS   12/13/2017 7:30 PM     HISTORY: Fall, pain.     COMPARISON: None.    FINDINGS: Slight avulsion of the superior aspect of the radial head on  the lateral view is suspected. Exam otherwise negative.      Impression    IMPRESSION: Probable tiny avulsion of the radial head.    KIM DALE MD   Wrist XR, G/E 3 views, right    Narrative    WRIST RIGHT THREE OR MORE VIEWS   12/13/2017 7:30 PM     HISTORY: Fall, pain.     COMPARISON: None.    FINDINGS: Chondrocalcinosis right wrist. Advanced degenerative changes  of the carpometacarpal articulation of the radial aspect of the right  wrist. Nothing acute.      Impression    IMPRESSION: Degenerative changes right wrist. Nothing acute. No  fracture identified.    KIM DALE MD   XR Chest 1 View    Narrative    CHEST ONE VIEW   12/13/2017 7:30 PM     HISTORY: Weakness.     COMPARISON: None.    FINDINGS: Degenerative changes right shoulder and prior left shoulder  arthroplasty. Heart and lungs negative.      Impression    IMPRESSION: Nothing acute. No infiltrates.    KIM DALE MD   Thoracic spine XR, 3 views    Narrative    THORACIC SPINE THREE VIEW   12/13/2017 7:30 PM     HISTORY: Fall, pain.     COMPARISON: None.    FINDINGS: Minimal hypertrophic changes thoracic spine. Generalized  mild narrowing of all of the thoracic disc spaces on a degenerative  basis. No fracture or acute appearing abnormality.      Impression    IMPRESSION: Degenerative changes throughout the thoracic spine but  nothing clearly acute.    KIM DALE  MD   Lumbar spine XR, 2-3 views    Narrative    LUMBAR SPINE TWO - THREE VIEWS   12/13/2017 7:30 PM     HISTORY: Fall, pain;.     COMPARISON: None.    FINDINGS: Generalized narrowing of all lumbar interspaces with vacuum  sign of degenerative disc disease at each level. Grade I  spondylolisthesis of L4 on L5 probably related to facet joint disease  in the mid and lower lumbar spine. No fracture or acute abnormality.  Surgical clips right upper quadrant. Mild lumbar curve concave to the  left.      Impression    IMPRESSION: Advanced generalized degenerative disease of the lumbar  spine without acute appearing abnormality. No fracture.    KIM DALE MD   CT Hand Right w/o Contrast    Narrative    CT RIGHT HAND WITHOUT CONTRAST  12/16/2017 9:22 AM     HISTORY:  Pain in anatomic snuffbox, right thumb base and at thenar  MCP as well as intense pain and swelling at third MCP. Status post  fall.      TECHNIQUE: Axial images with reconstructions. Radiation dose for this  scan was reduced using automated exposure control, adjustment of the  mA and/or kV according to patient size, or iterative reconstruction  technique.     FINDINGS: Suboptimal exam due to motion. Chondrocalcinosis, consistent  with calcium pyrophosphate deposition disease.  Prominent degenerative  arthrosis at the thumb CMC joint. Some degenerative arthrosis at the  thumb MCP and IP joints. Distal to the CMC joints is not well  evaluated due to motion. There is a subchondral cystic lesion or  intraosseous ganglion within the proximal scaphoid. No definite  scaphoid fracture is appreciated. If pain persists in this area, I  would recommend short-term radiographic followup or MRI.      Impression    IMPRESSION:  1. Exam compromised by motion.  2. No obvious scaphoid fracture is appreciated.  3. Additional findings discussed above.     STEFANIE SIM MD         Pending Results   None    Code Status   Comfort Care       Primary Care Physician   Sen LONGORIA  Sandra        Discharge Disposition   Discharged to [facility] with hospice.  Condition at discharge: Guarded    Consultations This Hospital Stay   SOCIAL WORK IP CONSULT  PHYSICAL THERAPY ADULT IP CONSULT  OCCUPATIONAL THERAPY ADULT IP CONSULT  ORTHOPEDIC SURGERY IP CONSULT  PHARMACY TO DOSE WARFARIN  ORTHOPEDIC SURGERY IP CONSULT  WOUND OSTOMY CONTINENCE NURSE  IP CONSULT  PALLIATIVE CARE ADULT IP CONSULT  SPIRITUAL HEALTH SERVICES IP CONSULT  PHYSICAL THERAPY ADULT IP CONSULT  OCCUPATIONAL THERAPY ADULT IP CONSULT  SOCIAL WORK IP CONSULT    Time Spent on this Encounter   Dinah WERNER MD, personally saw the patient today and spent greater than 30 minutes discharging this patient.    Discharge Orders     Reason for your hospital stay   Severe diffuse pain     Follow-up and recommended labs and tests    Follow up with hospice     Activity   Your activity upon discharge: activity as tolerated     DNR/DNI   Comfort care     Diet   Follow this diet upon discharge: Orders Placed This Encounter     Snacks/Supplements Adult: Ensure Plus (Adult); Between Meals     Combination Diet Regular Diet Adult       Discharge Medications   Current Discharge Medication List      START taking these medications    Details   MEDICATION INSTRUCTION If care facility cannot accept or use ranges, facility is instructed to use lower end of dosing range    Associated Diagnoses: Chronic pain syndrome      acetaminophen (TYLENOL) 325 MG tablet Take 2 tablets (650 mg) by mouth every 4 hours as needed for mild pain  Qty: 100 tablet, Refills: 0    Associated Diagnoses: Chronic pain syndrome      acetaminophen (TYLENOL) 650 MG Suppository Place 1 suppository (650 mg) rectally every 4 hours as needed for fever or mild pain (Do not exceed 4000 mg total acetaminophen per day.) Unwrap prior to insertion.  Qty: 12 suppository, Refills: 1    Associated Diagnoses: Chronic pain syndrome      atropine 1 % ophthalmic solution Take 2 drops by mouth, place  under tongue or place inside cheek every 2 hours as needed for other (terminal respiratory secretions) Not for ophthalmic use.  Qty: 5 mL, Refills: 1    Associated Diagnoses: Chronic pain syndrome      bisacodyl (DULCOLAX) 10 MG Suppository Unwrap and insert 1 suppository rectally twice daily as needed for constipation.  Qty: 12 suppository, Refills: 1    Associated Diagnoses: Chronic pain syndrome      oxyCODONE IR (ROXICODONE) 5 MG tablet Take 1 tablet (5 mg) by mouth every 4 hours May take additional 5 mg every 1 hour prn for pain, dyspnea, or respiratory rate greater than 20  Qty: 30 tablet, Refills: 0    Associated Diagnoses: Chronic pain syndrome      LORazepam (ATIVAN) 0.5 MG tablet Take 1 tablet (0.5 mg) by mouth, place under tongue or insert rectally every 4 hours as needed for anxiety (restlessness)  Qty: 30 tablet, Refills: 1    Associated Diagnoses: Chronic pain syndrome      melatonin 3 MG tablet Take 1 tablet (3 mg) by mouth nightly as needed for sleep    Associated Diagnoses: Insomnia, unspecified type      diclofenac (VOLTAREN) 1 % GEL topical gel Place 2 g onto the skin 4 times daily Over painful joints    Associated Diagnoses: Chronic pain syndrome      polyethylene glycol (MIRALAX/GLYCOLAX) Packet Take 17 g by mouth daily as needed for constipation  Qty: 7 packet    Associated Diagnoses: Drug-induced constipation      predniSONE (DELTASONE) 5 MG tablet For suspected polymyalgia rheumatica.  Take 10 mg daily for 4 weeks then taper to 5 mg daily for 4 weeks then stop unless directed to continue by Rheumatology.  Qty: 84 tablet    Associated Diagnoses: Myalgia         CONTINUE these medications which have NOT CHANGED    Details   !! Menthol-Zinc Oxide (CALMOSEPTINE EX) Externally apply topically 2 times daily       latanoprost (XALATAN) 0.005 % ophthalmic solution Place 1 drop into both eyes At Bedtime       OMEPRAZOLE PO Take 20 mg by mouth every morning       Misc Natural Products (OSTEO BI-FLEX  ADV DOUBLE ST PO) Take 1 tablet by mouth 2 times daily       cetirizine (ZYRTEC) 10 MG tablet Take 10 mg by mouth daily      GABAPENTIN PO Take 600 mg by mouth At Bedtime       multivitamin, therapeutic with minerals (MULTI-VITAMIN) TABS tablet Take 1 tablet by mouth daily      VITAMIN D, CHOLECALCIFEROL, PO Take 1,000 Units by mouth daily       Albuterol (VENTOLIN IN)       trolamine salicylate (ASPERCREME) 10 % cream Apply topically 2 times daily      fluticasone-vilanterol (BREO ELLIPTA) 100-25 MCG/INH oral inhaler Inhale 1 puff into the lungs daily      fluticasone (FLONASE) 50 MCG/ACT spray Spray 2 sprays into both nostrils daily      !! senna-docusate (SENOKOT-S;PERICOLACE) 8.6-50 MG per tablet Take 1 tablet by mouth daily      !! menthol-zinc oxide (CALMOSEPTINE) 0.44-20.625 % OINT ointment Apply topically every hour as needed for skin protection      pramox-pe-glycerin-petrolatum (PREPARATION H) 1-0.25-14.4-15 % CREA cream Place rectally as needed for hemorrhoids      !! senna-docusate (SENOKOT-S;PERICOLACE) 8.6-50 MG per tablet Take 1 tablet by mouth daily as needed for constipation      albuterol (PROAIR HFA/PROVENTIL HFA/VENTOLIN HFA) 108 (90 BASE) MCG/ACT Inhaler Inhale 2 puffs into the lungs 2 times daily as needed for shortness of breath / dyspnea or wheezing      atenolol (TENORMIN) 25 MG tablet Take 50 mg by mouth 2 times daily        !! - Potential duplicate medications found. Please discuss with provider.      STOP taking these medications       Warfarin Sodium (JANTOVEN PO) Comments:   Reason for Stopping:         Fish Oil-Cholecalciferol (FISH OIL + D3 PO) Comments:   Reason for Stopping:         TRAMADOL HCL PO Comments:   Reason for Stopping:         Omega-3 Fatty Acids (FISH OIL) 1200 MG capsule Comments:   Reason for Stopping:         HYDROcodone-acetaminophen (NORCO) 5-325 MG per tablet Comments:   Reason for Stopping:         HYDROcodone-acetaminophen (NORCO) 5-325 MG per tablet Comments:    Reason for Stopping:             Allergies   Allergies   Allergen Reactions     Nabumetone Rash     Sulfa Drugs Rash     Vioxx [Rofecoxib] Rash     Celecoxib      Nabumetone      Sulfa Drugs      Vioxx [Rofecoxib]      Celebrex [Celecoxib] Rash     Data   Most Recent 3 CBC's:  Recent Labs   Lab Test  12/21/17   0700  12/19/17   0800  12/14/17   0735   WBC  8.2  9.5  8.8   HGB  11.2*  12.4  11.9   MCV  100  98  100   PLT  362  369  253      Most Recent 3 BMP's:  Recent Labs   Lab Test  12/21/17   0700  12/20/17   1744  12/19/17   0800  12/15/17   0855   NA  137   --   136  135   POTASSIUM  3.6  5.0  3.6  3.9   CHLORIDE  103   --   102  105   CO2  27   --   27  22   BUN  12   --   12  17   CR  0.63   --   0.65  0.55   ANIONGAP  7   --   7  8   TRACEE  8.5   --   8.6  8.7   GLC  93   --   101*  114*     Most Recent 2 LFT's:  Recent Labs   Lab Test  12/14/17   0735  02/14/17 2031   AST  17  36   ALT  12  28   ALKPHOS  61  79   BILITOTAL  1.0  0.4     Most Recent INR's and Anticoagulation Dosing History:  Anticoagulation Dose History     Recent Dosing and Labs Latest Ref Rng & Units 2/14/2017 2/19/2017 12/13/2017 12/14/2017 12/15/2017 12/16/2017 12/17/2017    Warfarin 1 mg - - - - - 1 mg - -    INR 0.86 - 1.14 2.29(H) 2.25(H) 3.20(H) 3.57(H) 2.77(H) 4.25(H) 1.28(H)        Most Recent 3 Troponin's:  Recent Labs   Lab Test  12/13/17   1820  02/14/17 2031   TROPI  <0.015  <0.015  The 99th percentile for upper reference range is 0.045 ug/L.  Troponin values in   the range of 0.045 - 0.120 ug/L may be associated with risks of adverse   clinical events.       Most Recent Cholesterol Panel:No lab results found.  Most Recent 6 Bacteria Isolates From Any Culture (See EPIC Reports for Culture Details):  Recent Labs   Lab Test  12/13/17   2139 12/13/17   2135  12/13/17   1840  04/12/17   2350  02/14/17 2054 02/14/17 2047   CULT  No growth  No growth  >100,000 colonies/mL  Aerococcus urinae  Identification obtained by  MALDI-TOF mass spectrometry research use only database. Test   characteristics determined and verified by the Infectious Diseases Diagnostic Laboratory   ([facility]) [place], [place].  *  >100,000 colonies/mL urogenital zackary Susceptibility testing not routinely done  <10,000 colonies/mL urogenital zackary  Susceptibility testing not routinely done    No growth     Most Recent TSH, T4 and A1c Labs:No lab results found.

## 2017-12-26 NOTE — PROGRESS NOTES
[facility]  Palliative Care Progress Note  Text Page    Met with Chuyita as she was resting in bed. Next-of-kin/granddaughters Stacey Boothe and Taylor Carty met [facility] Social Worker STACI Reynaga and we moved to the ICU conference room for a discussion regarding dismissal. Her granddaughters have witnessed a change in Chuyita during the weekend as she has been more lethargic and uninterested in eating. She only took a bite of a Gail cookie yesterday, but seemed to enjoy celebrating [place] with her two great-grandchildren (Stacey's children are age 2 and 4 years). Her granddaughters are concerned about the amount of pain Chuyita has had so we discussed a plan to schedule Roxicodone every 4 hours for comfort which they readily agreed.           Assessment & Plan       1.  Dementia - patient does not demonstrate medical capacity. Family is assisting in care plan.        2.  Pain - Appreciate nursing efforts to maintain patient's comfort. She has needed 42.8 mg Roxicodone in the past day for comfort (equal analgesic to mg or oral morphine). Reasonable to use 7.5 mg Roxicodone every 4 hours to keep on top of pain and use 5 mg as needed for comfort.        3.  Goals of Care: DNR/DNI - Comfort care. Next-of-kin/POA Stacey Boothe has signed paperwork for [facility] with the plan to dismiss later today.        Vandana Hagen MS, RN, CNS, APRN, ACHPN, FAACVPR  Pain and Palliative Care  Pager [phone]  Office [phone]       Time Spent on this Encounter   I spent  From 8:45 Am until 9:30 AM minutes in assessment of the patient and discussion with the patient and family. Another 20 minutes in review of chart, documentation and discussion with the health care team.    Interval History   Chart reviewed     Palliative Symptom Review (0=no symptom/no concern, 1=mild, 2=moderate, 3=severe):      Pain: 2-moderate      Fatigue: 2-moderate      Nausea: 0-none      Constipation: 0-none       Diarrhea: 0-none      Depressive Symptoms: 0-none      Anxiety: 0-none      Drowsiness: 1-mild      Poor Appetite: 3-severe      Shortness of Breath: 0-none      Insomnia: 0-none        Physical Exam   SpO2:  [96 %-97 %] 96 %  143 lbs 11.2 oz  GEN:  Alert, oriented to self and situation, appears comfortable.  HEENT:  Normocephalic/atraumatic, no scleral icterus, no nasal discharge, mouth moist.  CV: Irregular at 88, S1, S2; no murmurs or other irregularities noted.  +3 DP/PT pulses bilaterally; no edema BLE.  RESP:  Clear to auscultation bilaterally without rales/rhonchi/wheezing/retractions.  Symmetric chest rise on inhalation noted.  Normal respiratory effort.  ABD:  Rounded, soft, non-tender/non-distended.  +BS  EXT:  CMS intact x 4.       SKIN:  Warm and dry to touch, no exanthems noted in the visualized areas.    PAIN BEHAVIOR: Cooperative  Psych:  Flat affect, calm, cooperative, and conversant.    Medications     - MEDICATION INSTRUCTIONS -         amitriptyline  50 mg Oral At Bedtime     atenolol  50 mg Oral BID     multivitamin, therapeutic with minerals  1 tablet Oral Daily     diclofenac  2 g Transdermal 4x Daily     predniSONE  15 mg Oral Daily     fluticasone-vilanterol  1 puff Inhalation Daily     gabapentin (NEURONTIN) tablet 600 mg  600 mg Oral At Bedtime     latanoprost  1 drop Both Eyes At Bedtime     omeprazole (priLOSEC) CR capsule 20 mg  20 mg Oral QAM AC     senna-docusate  1 tablet Oral Daily       Data   No results found for this or any previous visit (from the past 24 hour(s)).

## 2017-12-26 NOTE — PLAN OF CARE
Problem: Patient Care Overview  Goal: Discharge Needs Assessment  Pt grimacing and moaning in pain, controlled with PRN and scheduled oxy. Pt d/c to [facility] on hospice today. HE transport is set up for 1330 today.

## 2017-12-26 NOTE — PLAN OF CARE
Problem: Patient Care Overview  Goal: Plan of Care/Patient Progress Review  Outcome: No Change  Disoriented to time. Pt on comfort cares this shift.  Pt refusing turns intermittently.  Very lethargic this shift and oxycodone held intermittently.  Voltaren gel for pain, right arm sling in place. Hartman for retention and of life care with fair output.  Plan to discharge to [facility] on hospice possibly today.

## 2017-12-26 NOTE — PROGRESS NOTES
Family is aware of cost and private pay for HE transportation via [employer].  Judi Cameron RN BSN   Float Pool RN  RN Care Coordinator  [facility]   [phone]

## 2017-12-26 NOTE — PROGRESS NOTES
Transportation is set up for [employer] transport by [employer] per family request at 1:30PM today back to the [facility] with Hospice.  Family has been updated.    DC orders have been faxed to [facility] at [phone] and [facility] [phone] via [employer]      Judi Cameron RN BSN   Float Pool RN  RN Care Coordinator  [facility]   [phone]

## 2021-08-06 NOTE — PROGRESS NOTES
[facility]    Observation Unit   Hospitalist Progress Note  Name: Chuyita Gayle    MRN: [id number]  Provider:  Ronda Torres PA-C  Date of Service: 02/11/2017    Assessment and Plan  Chuyita Gayle is a 84 year old female with PMH significant for osteoarthritis, A-fib, HTN, GERD, asthma, and fibromyalgia who was admitted on 2/10/17 for uncontrolled left hip pain and weakness.       1. Left hip pain: no inciting fall or trauma to cause the patient's acute worsening of her left hip pain. Patient has known osteoarthritis of left hip which is likely the cause of her hip pain and increased weakness. X-ray of hip in the ED on admission showed mild degenerative changes. The patient has had previous steroid injections in her hip with relief of her pain, unfortunately injections are not performed here on the weekend. The patient's pain has been controlled but she has some mild hypoxia after oxycodone so this will be discontinued and replaced with PRN Tramadol. Both nursing and PT tried to ambulate the patient but the patient had difficulty ambulating. PT recommended the patient discharge to TCU for additional rehab. The patient currently resides at [facility] where she can receive increased cares. SW is consulted and assisting with finding out what increased care would entail at [facility] vs the patient getting a TCU bed. Due to unsafe discharge plan the patient will stay overnight and plan to discharge tomorrow once plan is in place.   - Pain control with scheduled Tylenol, PRN Naproxen, Flexeril, Atarax, and Tramadol  - PRN supplemental oxygen for increased somnolence from narcotics, wean as tolerated   - PT consulted and will plan to see patient again in the AM  - SW consulted for assistance with discharge planning   2. A-fib: INR therapeutic at 2.13, pharmacy to dose Warfarin  3. HTN: stable, continue Atenolol   4. Asthma: no recent exacerbations, continue Breo inhaler and PRN Albuterol inhaler   5. GERD:  continue omeprazole    6. Anxiety: PTA PRN ativan available     DVT Prophylaxis: Ambulate every shift  Code Status: Full Code    Disposition: Expected discharge tomorrow once safe discharge plan established     Interval History  Patient appears mildly somnolent. She notes her pain is currently controlled but she still feels weak. Denies chest pain, shortness of breath, cough, congestion, or N/V.     -Data reviewed today: I reviewed all new labs and imaging reports over the last 24 hours.    Physical Exam  Temp: 98.4  F (36.9  C) Temp src: Axillary BP: 115/53 mmHg   Heart Rate: 63 Resp: 18 SpO2: 96 % O2 Device: Nasal cannula Oxygen Delivery: 2 LPM  Filed Vitals:    02/10/17 1213   Weight: 70.308 kg (155 lb)     Vital Signs with Ranges  Temp:  [96.1  F (35.6  C)-98.4  F (36.9  C)] 98.4  F (36.9  C)  Heart Rate:  [63-98] 63  Resp:  [16-20] 18  BP: (115-184)/(53-93) 115/53 mmHg  SpO2:  [89 %-98 %] 96 %     GEN:  Alert, oriented x 3, appears comfortable, no overt distress  HEENT:  Normocephalic/atraumatic, no scleral icterus, no nasal discharge, mouth moist.  CV:  Regular rate and rhythm, no murmur or JVD.  S1 + S2 noted, no S3 or S4.  LUNGS:  Clear to auscultation bilaterally without rales/rhonchi/wheezing/retractions.  Symmetric chest rise on inhalation noted.  ABD:  Active bowel sounds, soft, non-tender/non-distended.  No rebound/guarding/rigidity.  EXT:  Mild tenderness to palpation over left SI joint and over lumbar spine. 1-2+ edema in bilateral LE.  No cyanosis.  No acute joint synovitis noted.  SKIN:  Dry to touch, no exanthems noted in the visualized areas.  NEURO:  Symmetric muscle strength, sensation to touch grossly intact.  Coordination symmetric on general exam.  No new focal deficits appreciated.    Medications    Warfarin Therapy Reminder         warfarin  2.5 mg Oral ONCE at 18:00     atenolol  25 mg Oral BID     fluticasone-vilanterol  1 puff Inhalation Daily     gabapentin  600 mg Oral At Bedtime      omeprazole  20 mg Oral Daily     acetaminophen  1,000 mg Oral Q6H JORGE     cyclobenzaprine  10 mg Oral TID     Data  Results for orders placed or performed during the hospital encounter of 02/10/17   XR Pelvis and Hip Left 2 Views    Narrative    XR PELVIS AND HIP LEFT 2 VIEWS 2/10/2017 1:44 PM    HISTORY: Hip pain. Worse overnight.    COMPARISON: None.    FINDINGS: No fracture or malalignment. Mild symmetric osteoarthritis  at the hips characterized by some loss of joint space and small  marginal osteophytes. Sacroiliac joints are patent and symmetric.      Impression    IMPRESSION: Mild degenerative change. No acute abnormality.    TARYN ZAPIEN MD   CBC with platelets differential   Result Value Ref Range    WBC 6.3 4.0 - 11.0 10e9/L    RBC Count 4.13 3.8 - 5.2 10e12/L    Hemoglobin 12.9 11.7 - 15.7 g/dL    Hematocrit 38.8 35.0 - 47.0 %    MCV 94 78 - 100 fl    MCH 31.2 26.5 - 33.0 pg    MCHC 33.2 31.5 - 36.5 g/dL    RDW 13.0 10.0 - 15.0 %    Platelet Count 287 150 - 450 10e9/L    Diff Method Automated Method     % Neutrophils 38.9 %    % Lymphocytes 46.9 %    % Monocytes 9.9 %    % Eosinophils 3.7 %    % Basophils 0.3 %    % Immature Granulocytes 0.3 %    Nucleated RBCs 0 0 /100    Absolute Neutrophil 2.4 1.6 - 8.3 10e9/L    Absolute Lymphocytes 2.9 0.8 - 5.3 10e9/L    Absolute Monocytes 0.6 0.0 - 1.3 10e9/L    Absolute Eosinophils 0.2 0.0 - 0.7 10e9/L    Absolute Basophils 0.0 0.0 - 0.2 10e9/L    Abs Immature Granulocytes 0.0 0 - 0.4 10e9/L    Absolute Nucleated RBC 0.0    Basic metabolic panel   Result Value Ref Range    Sodium 139 133 - 144 mmol/L    Potassium 4.2 3.4 - 5.3 mmol/L    Chloride 104 94 - 109 mmol/L    Carbon Dioxide 26 20 - 32 mmol/L    Anion Gap 9 3 - 14 mmol/L    Glucose 106 (H) 70 - 99 mg/dL    Urea Nitrogen 13 7 - 30 mg/dL    Creatinine 0.86 0.52 - 1.04 mg/dL    GFR Estimate 63 >60 mL/min/1.7m2    GFR Estimate If Black 76 >60 mL/min/1.7m2    Calcium 9.1 8.5 - 10.1 mg/dL   INR   Result Value  Ref Range    INR 1.95 (H) 0.86 - 1.14   Basic metabolic panel   Result Value Ref Range    Sodium 138 133 - 144 mmol/L    Potassium 4.1 3.4 - 5.3 mmol/L    Chloride 105 94 - 109 mmol/L    Carbon Dioxide 26 20 - 32 mmol/L    Anion Gap 7 3 - 14 mmol/L    Glucose 102 (H) 70 - 99 mg/dL    Urea Nitrogen 16 7 - 30 mg/dL    Creatinine 0.86 0.52 - 1.04 mg/dL    GFR Estimate 62 >60 mL/min/1.7m2    GFR Estimate If Black 75 >60 mL/min/1.7m2    Calcium 8.9 8.5 - 10.1 mg/dL   CBC with platelets differential   Result Value Ref Range    WBC 5.5 4.0 - 11.0 10e9/L    RBC Count 3.60 (L) 3.8 - 5.2 10e12/L    Hemoglobin 11.1 (L) 11.7 - 15.7 g/dL    Hematocrit 34.0 (L) 35.0 - 47.0 %    MCV 94 78 - 100 fl    MCH 30.8 26.5 - 33.0 pg    MCHC 32.6 31.5 - 36.5 g/dL    RDW 13.1 10.0 - 15.0 %    Platelet Count 251 150 - 450 10e9/L    Diff Method Automated Method     % Neutrophils 38.3 %    % Lymphocytes 45.3 %    % Monocytes 10.8 %    % Eosinophils 4.9 %    % Basophils 0.5 %    % Immature Granulocytes 0.2 %    Nucleated RBCs 0 0 /100    Absolute Neutrophil 2.1 1.6 - 8.3 10e9/L    Absolute Lymphocytes 2.5 0.8 - 5.3 10e9/L    Absolute Monocytes 0.6 0.0 - 1.3 10e9/L    Absolute Eosinophils 0.3 0.0 - 0.7 10e9/L    Absolute Basophils 0.0 0.0 - 0.2 10e9/L    Abs Immature Granulocytes 0.0 0 - 0.4 10e9/L    Absolute Nucleated RBC 0.0    INR   Result Value Ref Range    INR 2.13 (H) 0.86 - 1.14     Taylor Torres PA-C     yes

## 2024-02-22 NOTE — PLAN OF CARE
"Preventive Care Visit  [facility]  Gaby Lynn MD, Family Medicine  Feb 22, 2024    Assessment & Plan     Encounter for Medicare annual wellness exam    Postmenopausal status  Malignant neoplasm of upper-inner quadrant of left breast in female, estrogen receptor positive  - getting benefit from cancer rehab and is scheduled with Survivorship clinic  - Fosamax started 9/2021, stopped 1/2023   - She is at risk of bone loss on Letrozole/aromatase inhibitor therapy. DEXA bone density scan on 12/1/2022 with a lowest T-score of -2.2 c/w osteopenia.  - continue Zometa 4 mg IV once every 6 months for both prevention of osteoporosis and prevention of breast cancer bone metastases.   - DX Hip/Pelvis/Spine - due    Aortic valve stenosis, etiology of cardiac valve disease unspecified  - Follow-up echo and Cardiology this month    Mixed hyperlipidemia  - continue statin    Elevated serum glucose  - A1c 5.5%. Continue to follow    BMI  Estimated body mass index is 29.54 kg/m  as calculated from the following:    Height as of this encounter: 1.676 m (5' 6\").    Weight as of this encounter: 83 kg (183 lb).   Weight management plan: Discussed healthy diet and exercise guidelines    Counseling  Appropriate preventive services were discussed with this patient, including applicable screening as appropriate for fall prevention, nutrition, physical activity, Tobacco-use cessation, weight loss and cognition.  Checklist reviewing preventive services available has been given to the patient.    Ajay Grider is a 68 year old, presenting for the following:  Physical        2/22/2024    11:20 AM   Additional Questions   Roomed by cygi   Accompanied by self         2/22/2024    11:20 AM   Patient Reported Additional Medications   Patient reports taking the following new medications none         2/22/2024    Information    services provided? No     Health Care Directive  Patient does not have " Pain level improved today. Patient was able to take a 3 hour nap in the afternoon. Is still unable to tolerate any movement up her upper body.  Up in chair for meals via lift. Up to bed side commode 4 x attempting to have a BM. Rectal exam positive for formed stool. Bowel meds given. Supp given without results. Lungs decreased with crackles. Mepelex to coccyx.   a Health Care Directive or Living Will: Patient states has Advance Directive and will bring in a copy to clinic.    HPI  This last 2 years year has largely focused on treatment of her Stage II, T2N0M0, ER+/ND+/HER2- IDC left breast.  S/p 4 cycles of neoadjuvant TC chemotherapy followed by left breast lumpectomy and sentinel lymph node biopsy ([id number], residual tumor HER2 positive), radiation, and one year adjuvant Kadcycla.      Kadcyla caused a lot of fatigue, slowly improving.  On Letrozole and tolerating despite some arthralgias. Plan is 10 year course.         2/19/2024   General Health   How would you rate your overall physical health? Good   Feel stress (tense, anxious, or unable to sleep) Not at all         2/19/2024   Nutrition   Diet: Low fat/cholesterol         2/19/2024   Exercise   Days per week of moderate/strenous exercise 2 days   Average minutes spent exercising at this level 20 min   (!) EXERCISE CONCERN      2/19/2024   Social Factors   Frequency of gathering with friends or relatives Twice a week   Worry food won't last until get money to buy more No   Food not last or not have enough money for food? No   Do you have housing?  Yes   Are you worried about losing your housing? No   Lack of transportation? No   Unable to get utilities (heat,electricity)? No         2/19/2024   Fall Risk   Fallen 2 or more times in the past year? No   Trouble with walking or balance? No          2/19/2024   Activities of Daily Living- Home Safety   Needs help with the following daily activites None of the above   Safety concerns in the home None of the above         2/19/2024   Dental   Dentist two times every year? Yes         2/19/2024   Hearing Screening   Hearing concerns? None of the above         2/19/2024   Driving Risk Screening   Patient/family members have concerns about driving No         2/19/2024   General Alertness/Fatigue Screening   Have you been more tired than usual lately? No         2/19/2024    Urinary Incontinence Screening   Bothered by leaking urine in past 6 months Yes         2/19/2024   TB Screening   Were you born outside of [place]?  No         Today's PHQ-2 Score:       2/21/2024     9:39 PM   PHQ-2 ( 1999 [employer])   Q1: Little interest or pleasure in doing things 0   Q2: Feeling down, depressed or hopeless 0   PHQ-2 Score 0   Q1: Little interest or pleasure in doing things Not at all   Q2: Feeling down, depressed or hopeless Not at all   PHQ-2 Score 0           2/19/2024   Substance Use   Alcohol more than 3/day or more than 7/wk No   Do you have a current opioid prescription? No   How severe/bad is pain from 1 to 10? 2/10   Do you use any other substances recreationally? No     Social History     Tobacco Use    Smoking status: Never     Passive exposure: Never    Smokeless tobacco: Never    Tobacco comments:     zero history   Vaping Use    Vaping Use: Never used   Substance Use Topics    Alcohol use: Yes     Comment: extremely infrequent after cancer diagnosis (12 w/in a yr)    Drug use: Never             Mammogram scheduled for July     BP Readings from Last 6 Encounters:   02/22/24 123/81   01/15/24 132/85   11/13/23 129/72   10/24/23 138/84   10/02/23 130/81   09/11/23 137/86      Wt Readings from Last 4 Encounters:   02/22/24 83 kg (183 lb)   01/15/24 82.4 kg (181 lb 9.6 oz)   11/13/23 84.3 kg (185 lb 12.8 oz)   10/24/23 84 kg (185 lb 3.2 oz)      ASCVD Risk   The 10-year ASCVD risk score (Josiane BILLINGS, et al., 2019) is: 6.8%    Values used to calculate the score:      Age: 68 years      Sex: Female      Is Non- : No      Diabetic: No      Tobacco smoker: No      Systolic Blood Pressure: 123 mmHg      Is BP treated: No      HDL Cholesterol: 78 mg/dL      Total Cholesterol: 231 mg/dL    Dexa- T score -2.2 12/1/22        Reviewed and updated as needed this visit by Provider                FHX, Sexual hx    Current providers sharing in care for this patient  include:  Patient Care Team:  Gaby Lynn MD as PCP - General (Family Medicine)  Gaby Lynn MD as Referring Physician (Family Practice)  Hiro Christian MD as MD (Ophthalmology)  Yuri Sarah MD as MD (Ophthalmology)  Gaby Lynn MD as Assigned PCP  Nicole Brooke MD as MD (Cardiovascular Disease)  Nicole Brooke MD as Assigned Heart and Vascular Provider  Chandrika Horvath MD as MD (Hematology & Oncology)  Shanthi Gordon, RN as Specialty Care Coordinator (Hematology & Oncology)  Conchis Murphy MD as MD (Radiation Oncology)  Hector Choi MD as MD (Ophthalmology)  Sadie Roberto, RN as Specialty Care Coordinator (Breast Oncology)  Steve Vargas MD as MD (Ophthalmology)  Conchis Murphy MD as Assigned Cancer Care Provider  Hector Choi MD as Assigned Surgical Provider    The following health maintenance items are reviewed in Epic and correct as of today:  Health Maintenance   Topic Date Due    MEDICARE ANNUAL WELLNESS VISIT  01/17/2024    MAMMO SCREENING  07/17/2024    FALL RISK ASSESSMENT  02/22/2025    GLUCOSE  11/13/2026    COLORECTAL CANCER SCREENING  02/23/2027    DTAP/TDAP/TD IMMUNIZATION (4 - Td or Tdap) 10/12/2027    ADVANCE CARE PLANNING  01/24/2028    LIPID  02/20/2029    DEXA  12/01/2037    HEPATITIS C SCREENING  Completed    PHQ-2 (once per calendar year)  Completed    INFLUENZA VACCINE  Completed    Pneumococcal Vaccine: 65+ Years  Completed    RSV VACCINE (Pregnancy & 60+)  Completed    COVID-19 Vaccine  Completed    IPV IMMUNIZATION  Aged Out    HPV IMMUNIZATION  Aged Out    MENINGITIS IMMUNIZATION  Aged Out    RSV MONOCLONAL ANTIBODY  Aged Out    PAP  Discontinued    ZOSTER IMMUNIZATION  Discontinued       Review of Systems   Constitutional:  Positive for fatigue.   Eyes:         Glaucoma. Follow-up scheduled   Respiratory: Negative.     Cardiovascular:  Negative for chest pain and palpitations.   Musculoskeletal:  Positive for arthralgias.        Knees  "  Neurological: Negative.    Hematological: Negative.    Psychiatric/Behavioral: Negative.           Objective    Exam  /81 (BP Location: Left arm, Patient Position: Sitting, Cuff Size: Adult Regular)   Pulse 76   Temp 97.8  F (36.6  C)   Resp 17   Ht 1.676 m (5' 6\")   Wt 83 kg (183 lb)   LMP 05/17/2011   SpO2 96%   BMI 29.54 kg/m     Estimated body mass index is 29.54 kg/m  as calculated from the following:    Height as of this encounter: 1.676 m (5' 6\").    Weight as of this encounter: 83 kg (183 lb).    Physical Exam  Constitutional:       General: She is not in acute distress.     Appearance: She is not ill-appearing.   HENT:      Right Ear: Tympanic membrane normal.      Left Ear: Tympanic membrane normal.      Mouth/Throat:      Mouth: Mucous membranes are moist.      Pharynx: Oropharynx is clear.   Eyes:      Extraocular Movements: Extraocular movements intact.      Conjunctiva/sclera: Conjunctivae normal.      Pupils: Pupils are equal, round, and reactive to light.   Neck:      Thyroid: No thyromegaly.   Cardiovascular:      Rate and Rhythm: Normal rate and regular rhythm.      Heart sounds: Murmur heard.   Pulmonary:      Effort: Pulmonary effort is normal.      Breath sounds: Normal breath sounds.   Abdominal:      General: Bowel sounds are normal.      Palpations: Abdomen is soft. There is no mass.      Tenderness: There is no abdominal tenderness.   Musculoskeletal:      Right lower leg: No edema.      Left lower leg: No edema.   Lymphadenopathy:      Cervical: No cervical adenopathy.   Skin:     General: Skin is warm.   Neurological:      General: No focal deficit present.      Mental Status: She is alert and oriented to person, place, and time.   Psychiatric:         Mood and Affect: Mood normal.         Behavior: Behavior normal.               2/22/2024   Mini Cog   Clock Draw Score 2 Normal   3 Item Recall 2 objects recalled   Mini Cog Total Score 4          Signed Electronically by: " Gaby Lynn MD